# Patient Record
Sex: FEMALE | Race: WHITE | Employment: OTHER | URBAN - METROPOLITAN AREA
[De-identification: names, ages, dates, MRNs, and addresses within clinical notes are randomized per-mention and may not be internally consistent; named-entity substitution may affect disease eponyms.]

---

## 2017-01-03 ENCOUNTER — HOSPITAL ENCOUNTER (OUTPATIENT)
Dept: PHYSICAL THERAPY | Age: 72
Discharge: HOME | End: 2017-01-03
Payer: MEDICARE

## 2017-01-03 ENCOUNTER — OFFICE VISIT (OUTPATIENT)
Dept: ORTHOPEDIC SURGERY | Age: 72
End: 2017-01-03

## 2017-01-03 DIAGNOSIS — M12.812 ROTATOR CUFF ARTHROPATHY, LEFT: Primary | ICD-10-CM

## 2017-01-03 PROCEDURE — G8988 SELF CARE GOAL STATUS: HCPCS

## 2017-01-03 PROCEDURE — G8989 SELF CARE D/C STATUS: HCPCS

## 2017-01-03 PROCEDURE — 97161 PT EVAL LOW COMPLEX 20 MIN: CPT

## 2017-01-03 PROCEDURE — 97110 THERAPEUTIC EXERCISES: CPT

## 2017-01-03 PROCEDURE — 97535 SELF CARE MNGMENT TRAINING: CPT

## 2017-01-03 PROCEDURE — G8987 SELF CARE CURRENT STATUS: HCPCS

## 2017-01-03 NOTE — H&P
HISTORY AND PHYSICAL          Patient: Akbar Lee                MRN: [id number]       SSN: [id number]  YOB: 1945          AGE: 66 y.o. SEX: female      Patient scheduled for:  Left reverse total shoulder arthroplasty    Surgeon: Antonia Bella MD    ANESTHESIA TYPE:  General    HISTORY:     The patient was seen in the office today for a preoperative history and physical for an upcoming above listed surgery. The patient is a pleasant 66 y.o. female who has a history of left shoulder pain for years. Has a difficult time lifting arm. Has significant pain despite conservative treatments. Due to the current findings, affected activity of daily living and continued pain and discomfort, surgical intervention is indicated. The alternatives, risks, and complications, including but not limited to infection, blood loss, need for blood transfusion, neurovascular damage, fransisco-incisional numbness, subcutaneous hematoma, bone fracture, anesthetic complications, DVT, PE, death, RSD, postoperative stiffness and pain, possible surgical scar, delayed healing and nonhealing, reflexive sympathetic dystrophy, damage to blood vessels and nerves, need for more surgery, MI, and stroke,  failure of hardware, gait disturbances,have been discussed. The patient understands and wishes to proceed with surgery. PAST MEDICAL HISTORY:     Past Medical History   Diagnosis Date    Arthritis, left knee     Asthma     Back pain     Fuchs' corneal dystrophy      bilateral    GERD (gastroesophageal reflux disease)     Hypertension     Left shoulder pain     Osteoporosis     S/p knee replacement right        CURRENT MEDICATIONS:     Current Outpatient Prescriptions   Medication Sig Dispense Refill    albuterol (PROVENTIL HFA) 90 mcg/actuation inhaler Take 2 Puffs by inhalation every six (6) hours as needed for Wheezing.  omeprazole (PRILOSEC) 20 mg capsule Take 20 mg by mouth daily.       triamterene-hydroCHLOROthiazide (DYAZIDE) 37.5-25 mg per capsule Take 1 Cap by mouth daily.  multivitamin (ONE A DAY) tablet Take 1 Tab by mouth daily.  Calcium-Cholecalciferol, D3, (CALCIUM 600 WITH VITAMIN D3) 600 mg(1,500mg) -400 unit cap Take  by mouth two (2) times a day.  diclofenac-miSOPROStol (ARTHROTEC 75)  mg-mcg per tablet Take 1 Tab by mouth daily.  FLUoxetine (PROZAC) 20 mg capsule Take 20 mg by mouth nightly.  zolpidem (AMBIEN) 5 mg tablet Take  by mouth nightly as needed for Sleep.  meclizine (ANTIVERT) 25 mg tablet Take  by mouth three (3) times daily as needed.  loratadine (CLARITIN) 10 mg tablet Take 10 mg by mouth daily as needed for Allergies.  BRINZOLAMIDE/BRIMONIDINE TART (SIMBRINZA OP) Apply 1 Drop to eye three (3) times daily.  fluorometholone (FML FORTE) 0.25 % ophthalmic suspension Administer 1 Drop to both eyes two (2) times a day.  ciprofloxacin HCl (CIPRO) 500 mg tablet Take 1 Tab by mouth two (2) times a day for 7 days. 14 Tab 0    HYDROcodone-acetaminophen (NORCO) 7.5-325 mg per tablet Take 1 Tab by mouth every six (6) hours as needed for Pain. Max Daily Amount: 4 Tabs. 40 Tab 0    cyclobenzaprine (FLEXERIL) 10 mg tablet Take 1 Tab by mouth three (3) times daily as needed for Muscle Spasm(s). 30 Tab 0    fluticasone-salmeterol (ADVAIR) 100-50 mcg/dose diskus inhaler Take 1 Puff by inhalation every twelve (12) hours.  traMADol (ULTRAM) 50 mg tablet Take 50 mg by mouth two (2) times a day.  POTASSIUM PO Take  by mouth two (2) times a day.          ALLERGIES:     No Known Allergies      SURGICAL HISTORY:     Past Surgical History   Procedure Laterality Date    Hx heent       corneal transplants    Hx gyn       hysterectomy    Hx orthopaedic       knee replacement     Hx knee replacement Right     Hx cholecystectomy         SOCIAL HISTORY:     Social History     Social History    Marital status:      Spouse name: N/A    Number of children: N/A    Years of education: N/A     Social History Main Topics    Smoking status: Never Smoker    Smokeless tobacco: Not on file    Alcohol use No    Drug use: No    Sexual activity: Not on file     Other Topics Concern    Not on file     Social History Narrative       FAMILY HISTORY:     Family History   Problem Relation Age of Onset    Arthritis-osteo Other     Diabetes Father     Heart Disease Father        REVIEW OF SYSTEMS:     Negative for fevers, chills, chest pain, shortness of breath, weight loss, recent illness     General: Negative for fever and chills. No unexpected change in weight. Denies fatigue. No change in appetite. Skin: Negative for rash or itching. HEENT: Negative for congestion, sore throat, neck pain and neck stiffness. No change in vision or hearing. Hasn't noted any enlarged lymph nodes in the neck. Cardiovascular:  Negative for chest pain and palpitations. Has not noted pedal edema. Respiratory: Negative for cough, colds, sinus, hemoptysis, shortness of breath and wheezing. Gastrointestinal: Negative for nausea and vomiting, rectal bleeding, coffee ground emesis, abdominal pain, diarrhea and constipation. Genitourinary: Negative for dysuria, frequency urgency, or burning on micturition. No flank pain, no foul smelling urine, no difficulty with initiating urination. Hematological: Negative for bleeding or easy bruising. Musculoskeletal: Negative  for arthralgias, back pain or neck pain. Neurological: Negative for dizziness, seizures or syncopal episodes. Denies headaches. Endocrine: Denies excessive thirst.  No heat/cold intolerance. Psychiatric: Negative for depression or insomnia. PHYSICAL EXAMINATION:     VITALS: There were no vitals taken for this visit. GEN:  Well developed, well nourished 66 y.o. female in no acute distress. HEENT: Normocephalic and atraumatic. Eyes: Conjunctivae and EOM are normal.Pupils are equal, round, and reactive to light. External ear normal appearance, external nose normal appearing. Mouth/Throat: Oropharynx is clear and moist, able to handle oral secretions w/out difficulty, airway patent  NECK: Supple. Normal ROM, No lymphadenopathy. Trachea is midline. No bruising, swelling or deformity  RESP: Clear to auscultation bilaterally. No wheezes, rales, rhonchi. Normal effort and breath sounds. No respiratory distress  CARDIO:  Normal rate, regular rhythm and normal heart sounds. No MGR. ABDOMEN: Soft, non-tender, non-distended, normoactive bowel sounds in all four quadrants. There is no tenderness. There is no rebound and no guarding. BACK: No CVA or spinal tenderness  BREAST:  Deferred  PELVIC:    Deferred   RECTAL:  Deferred   :           Deferred  EXTREMITIES: EXAMINATION OF: left shoulder  Examination Left shoulder   Skin Intact   AC joint tenderness -   Biceps tenderness -   Forward flexion/Elevation    Active abduction    Glenohumeral abduction 80   External rotation ROM 40   Internal rotation ROM 30   Apprehension -   Carys Relocation -   Jerk -   Load and Shift -   Obriens -   Speeds -   Impingement sign   +   Supraspinatus/Empty Can +4/5   External Rotation Strength -, 5/5   Lift Off/Belly Press -, 5/5   Neurovascular Intact       NEUROVASCULAR: Sensation intact to light touch and strength grossly intact and symmetrical. No nystagmus. Positive distal pulses and capillary refill. DVT ASSESSMENT:  There is not  calf tenderness. No evidence of DVT seen on physical exam.  MOTOR: In tact  PSYCH: Alert an oriented to person, place and time. Mood, memory, affect, behavior and judgment normal       RADIOGRAPHS & DIAGNOSTIC STUDIES:     MRI from 2011 shows left shoulder rotator cuff tear with retraction and with glenohumeral arthritis/xray reveals proximal migration of humeral head with degenerative arthritis.        LABS:       @  CBC:   Lab Results   Component Value Date/Time    WBC 7.1 12/29/2016 10:31 AM    RBC 3.72 12/29/2016 10:31 AM    HGB 11.5 12/29/2016 10:31 AM    HCT 35.0 12/29/2016 10:31 AM    PLATELET 776 98/06/3692 10:31 AM   , BMP:   Lab Results   Component Value Date/Time    Glucose 88 12/29/2016 10:31 AM    Sodium 139 12/29/2016 10:31 AM    Potassium 4.6 12/29/2016 10:31 AM    Chloride 103 12/29/2016 10:31 AM    CO2 29 12/29/2016 10:31 AM    BUN 35 12/29/2016 10:31 AM    Creatinine 1.31 12/29/2016 10:31 AM    Calcium 9.5 12/29/2016 10:31 AM   , Coagulation:   Lab Results   Component Value Date/Time    Prothrombin time 12.1 12/29/2016 10:31 AM    INR 0.9 12/29/2016 10:31 AM    aPTT 28.7 12/29/2016 10:31 AM    and U/A show small UTI@    Preoperative labs were reviewed and are substantially within normal limits   EKG: unchanged from previous tracings, PAC's noted. CXR shows no acute abnormalities    ASSESSMENT:       Encounter Diagnosis   Name Primary?  Rotator cuff arthropathy, left Yes       PLAN:     Again, the alternatives, risks, and complications, as well as expected outcome were discussed. The patient understands and agrees to proceed with left shoulder reverse total arthroplasty. Patient has been treated with Cipro for her UTI. She has been cleared by her PCP. Patient given orders listed below:    No orders of the defined types were placed in this encounter.         Lluvia Goldsmith PA-C  1/3/2017  1:40 PM

## 2017-01-03 NOTE — PROGRESS NOTES
In [address]  [address]  [place], [address]  [phone] fax    Orthopedic Surgical Pre-op Assessment and Plan of Care     Patient name: Jj Rodriguez Start of Care: 1/3/2017   Referral source: Luigi Patton : 1945    Medical Diagnosis: Left shoulder pain [M25.512] Surgery Date:2017    Prior Hospitalization: see medical history Provider#: [id number]   Medications: Verified on Patient summary List   Date:1/3/2017      [x]  Patient  Verified  Payor: Roman Bangura / Plan: [place] PART A & B / Product Type: Medicare /          In time:1140  Out time:1225  Total Treatment Time (min): 45  Total Timed Codes (min): 23  1:1 Treatment Time ( [address] only): 39   Visit #: 1 of 1           Subjective Review:  Pain Level (0-10 scale): 3  Any medication changes, allergies to medications, adverse drug reactions, diagnosis change, or new procedure performed?: [x] No    [] Yes (see summary sheet for update)    PLOF: Unable to lift left shoulder for 4 years - injured during TKR rehab - transferring out of bed with trapeze bar. Pt refused surgery - fear of pain    Limitations to PLOF: Able to elevate shoulder up until 12 months ago. Current symptoms/Complaints: Unable sleep at night, elevate shoulder,     Previous Treatment/Compliance: Cortisone shots - refused surgery although recommended secondary to RTC tear. PMHx/Surgical Hx: (B) TKRs, gallbadder, hysterectomy, corneal transplant    Motivation: high  Substance use: [] Alcohol     [] tobacco [] other:  Cognition: A & O x 4    Other:    Activity/Recreational Limitations: Cannot elevate left shoulder    What type of home do you live in now? 2 story home, BR on second floor. Can sleep on 1st floor    Do you plan to return there after surgery? YES                   If no, where are planning to live after surgery? n/a    How many stairs/steps to enter your home?  3 Railing: YES    How many stairs/steps inside of your home? 17 Railing:  YES. Also has a ramp to enter   Can you stay on the entry level? YES    Do you live alone? YES, but sister in law will be staying with patient after hospital discharge  Will anyone be available to help you when you leave the hospital?  YES                If yes, what is their relationship to you? Sister in law         Can they assist you with bathing and dressing? YES     Can someone assist you with transportation to physical therapy? YES    Where do you plan to go when you leave the hospital? Home with assistance    What is your employment status? [x] Retired     [] Disabled     [] Employed  []  Unemployed  If EMPLOYED, job requires mainly: [] Sitting     [] Light Activity     [] Heavy Activity  Do you own or are any of the following items available to you (check all that apply)? [x] Walker: [x] Wheels [] No Wheels     [x] [employer]     [] Crutches     [x] Shower Chair/Tub Bench   [x] Bedside Commode/Elevated Toilet Seat     [x] Other equipment: lift chair, ramp    In your bathroom, do you have the following? Check all that apply. [] Tub Only   [] Tub/Shower Combination  [x] Walk-in Shower    Are you:  right handed     Describe your Functional Level (check all that apply):     Bathing [x] Independent   [] Require Assistance from someone   [] Sponge Bath Only        Dressing [x] Independent     [] Require assistance from someone for: [] socks/shoes   [] pants   [] everything   Walking [x] Independent   [] Walk indoors only   [] Walk outdoors   [] Use assistive device       [] Use wheelchair only        Household Activities [x] Routine house & yard work   [] Light house work only   [] None     ROM:  [] Unable to assess at this time     Strength:   [x] Unable to assess at this time                                                                                                    [] Left  [] Right Gross Strength Left Right   Elevation against gravity [] WNL  [] Impaired  [] Unable Flexion [] WNL  [] Impaired  [x] Unable [x] WNL  [] Impaired  [] Unable   Quality of motion [] WNL  [] Impaired   Scaption/ABD [] WNL  [] Impaired  [x] Unable [x] WNL  [] Impaired  [] Unable   Endfeel [] Soft  [] Hard  [] Springy  [] Empty IR/ER [] WNL  [x] Impaired  [] Unable [x] WNL  [] Impaired  [] Unable     Objective Screen:  1. Functional testing to determine fall risk (to be completed with a sling on the operative arm)  a. TUG: Time 8    Assistive Device: none    Fall Risk :  NO * a score of >14 seconds indicates fall risk   (low complexity <10sec, moderate complexity 10-20sec, high complexity >20sec)  b. Stair Climbing Test: Time 9 seconds  Assistive Device: none   Fall Risk :   NO  c. Functional Screen:   Sit-Supine:  Independent           Supine-sit:  Independent                Transfers:    Independent        Static Balance: 2 trials for 30 seconds with none assistance       [place]: [x]Floor   [x]Foam  [] Other    MSR:           []Floor   []Foam  [] Other  Gait Distance/Safety:   Household Distances with none and no assistance. Capable caregiver  flipClass with none  and no assistance. Capable caregiver  YES    8 min Therapeutic Exercise:  [x] See flow sheet :   Rationale: increase ROM and increase strength to improve the patients ability to prevent scar tissue    15 min Self Care/Home Management: sling donning/doffing, positioning, hygeine   Rationale: increase functional (I) and safety  to improve the patients ability to protect shoulder    Patient/Caregiver Education/Goals: To be accomplished in 1 visit  1. Caregiver/Patient demonstrates understanding of TSR precautions as stated on education form. Goal MET  YES  2.  Caregiver/Patient demonstrates and verbalizes understanding of HEP as issued on exercise handout (Pendulums, shoulder isometrics: flexion, extension, abduction, scapular squeezes, assisted elbow flexion/extension, wrist AROM, gripping). Goal MET  YES  3. Caregiver/Patient demonstrates understanding of [place] a shirt withTSR precautions as stated on education form. Goal MET  YES  4. Caregiver/Patient demonstrates understanding of [place] sling withTSR precautions as stated on education form. Goal MET  YES  Pain Level at end of session (0-10 scale): 3     The Plan of Care and following information is based on the information from the initial evaluation. Assessment/ key information: Pt presented to therapy for a pre-operative evaluation to assess functional (I) and discharge disposition. Pt is functionally (I) and lives completely (I) but has family coming in to stay with her post discharge from hospital that is capable of assisting with ADLs, safety, and transportation to outpatient PT. Both patient and therapist agree that discharge to the [address] [place] [address] is most recommended for patient. Problem List: pain affecting function, decrease ROM and decrease strength     Treatment Plan may include any combination of the following: Therapeutic exercise, Therapeutic activities, Physical agent/modality and Manual therapy    Patient / Family readiness to learn indicated by: asking questions, trying to perform skills and interest    Persons(s) to be included in education: patient (P)    Barriers to Learning/Limitations: None    Patient Goal Following Surgery: [facility]    Patient Self Reported Health Status: good    Rehabilitation Potential: excellent    Recommend Discharge to: [x] Outpatient PT     [] Home Health PT     [] SNF     Frequency / Duration: Patient to be seen 1 time per week for 1 treatment    G-Codes (GP)    Self Care   Current  CL= 60-79%   Goal  CL= 60-79%   D/C  CL= 60-79%    The severity rating is based on clinical judgment.     Certification Period: 1/3/2017 - 2/2/2017  Viry Rodrigues, PT 1/3/2017 11:36 AM    ________________________________________________________________________    I certify that the above Therapy Services are being furnished while the patient is under my care. I agree with the treatment plan and certify that this therapy is necessary.     [de-identified] Signature:____________________  Date:____________Time: _________

## 2017-01-04 DIAGNOSIS — M19.012 PRIMARY OSTEOARTHRITIS OF LEFT SHOULDER: Primary | ICD-10-CM

## 2017-01-10 ENCOUNTER — ANESTHESIA EVENT (OUTPATIENT)
Dept: SURGERY | Age: 72
DRG: 483 | End: 2017-01-10
Payer: MEDICARE

## 2017-01-11 ENCOUNTER — ANESTHESIA (OUTPATIENT)
Dept: SURGERY | Age: 72
DRG: 483 | End: 2017-01-11
Payer: MEDICARE

## 2017-01-11 ENCOUNTER — APPOINTMENT (OUTPATIENT)
Dept: GENERAL RADIOLOGY | Age: 72
DRG: 483 | End: 2017-01-11
Attending: PHYSICIAN ASSISTANT
Payer: MEDICARE

## 2017-01-11 PROBLEM — M12.819 ROTATOR CUFF TEAR ARTHROPATHY: Status: ACTIVE | Noted: 2017-01-11

## 2017-01-11 PROBLEM — M75.100 ROTATOR CUFF TEAR ARTHROPATHY: Status: ACTIVE | Noted: 2017-01-11

## 2017-01-11 PROCEDURE — 74011250636 HC RX REV CODE- 250/636: Performed by: NURSE ANESTHETIST, CERTIFIED REGISTERED

## 2017-01-11 PROCEDURE — 77030008683 HC TU ET CUF COVD -A: Performed by: ANESTHESIOLOGY

## 2017-01-11 PROCEDURE — 73020 X-RAY EXAM OF SHOULDER: CPT

## 2017-01-11 PROCEDURE — 74011000250 HC RX REV CODE- 250

## 2017-01-11 PROCEDURE — 74011250636 HC RX REV CODE- 250/636

## 2017-01-11 PROCEDURE — 74011250636 HC RX REV CODE- 250/636: Performed by: PHYSICIAN ASSISTANT

## 2017-01-11 RX ORDER — GLYCOPYRROLATE 0.2 MG/ML
INJECTION INTRAMUSCULAR; INTRAVENOUS AS NEEDED
Status: DISCONTINUED | OUTPATIENT
Start: 2017-01-11 | End: 2017-01-11 | Stop reason: HOSPADM

## 2017-01-11 RX ORDER — NEOSTIGMINE METHYLSULFATE 5 MG/5 ML
SYRINGE (ML) INTRAVENOUS AS NEEDED
Status: DISCONTINUED | OUTPATIENT
Start: 2017-01-11 | End: 2017-01-11 | Stop reason: HOSPADM

## 2017-01-11 RX ORDER — DEXAMETHASONE SODIUM PHOSPHATE 4 MG/ML
INJECTION, SOLUTION INTRA-ARTICULAR; INTRALESIONAL; INTRAMUSCULAR; INTRAVENOUS; SOFT TISSUE AS NEEDED
Status: DISCONTINUED | OUTPATIENT
Start: 2017-01-11 | End: 2017-01-11 | Stop reason: HOSPADM

## 2017-01-11 RX ORDER — SUCCINYLCHOLINE CHLORIDE 20 MG/ML
INJECTION INTRAMUSCULAR; INTRAVENOUS AS NEEDED
Status: DISCONTINUED | OUTPATIENT
Start: 2017-01-11 | End: 2017-01-11 | Stop reason: HOSPADM

## 2017-01-11 RX ORDER — ROCURONIUM BROMIDE 10 MG/ML
INJECTION, SOLUTION INTRAVENOUS AS NEEDED
Status: DISCONTINUED | OUTPATIENT
Start: 2017-01-11 | End: 2017-01-11 | Stop reason: HOSPADM

## 2017-01-11 RX ORDER — LIDOCAINE HYDROCHLORIDE 20 MG/ML
INJECTION, SOLUTION EPIDURAL; INFILTRATION; INTRACAUDAL; PERINEURAL AS NEEDED
Status: DISCONTINUED | OUTPATIENT
Start: 2017-01-11 | End: 2017-01-11 | Stop reason: HOSPADM

## 2017-01-11 RX ORDER — ONDANSETRON 2 MG/ML
INJECTION INTRAMUSCULAR; INTRAVENOUS AS NEEDED
Status: DISCONTINUED | OUTPATIENT
Start: 2017-01-11 | End: 2017-01-11 | Stop reason: HOSPADM

## 2017-01-11 RX ORDER — PROPOFOL 10 MG/ML
INJECTION, EMULSION INTRAVENOUS AS NEEDED
Status: DISCONTINUED | OUTPATIENT
Start: 2017-01-11 | End: 2017-01-11 | Stop reason: HOSPADM

## 2017-01-11 RX ORDER — EPHEDRINE SULFATE/0.9% NACL/PF 25 MG/5 ML
SYRINGE (ML) INTRAVENOUS AS NEEDED
Status: DISCONTINUED | OUTPATIENT
Start: 2017-01-11 | End: 2017-01-11 | Stop reason: HOSPADM

## 2017-01-11 RX ORDER — FENTANYL CITRATE 50 UG/ML
INJECTION, SOLUTION INTRAMUSCULAR; INTRAVENOUS AS NEEDED
Status: DISCONTINUED | OUTPATIENT
Start: 2017-01-11 | End: 2017-01-11 | Stop reason: HOSPADM

## 2017-01-11 RX ADMIN — FENTANYL CITRATE 50 MCG: 50 INJECTION, SOLUTION INTRAMUSCULAR; INTRAVENOUS at 08:00

## 2017-01-11 RX ADMIN — ROCURONIUM BROMIDE 10 MG: 10 INJECTION, SOLUTION INTRAVENOUS at 09:02

## 2017-01-11 RX ADMIN — ONDANSETRON 4 MG: 2 INJECTION INTRAMUSCULAR; INTRAVENOUS at 07:37

## 2017-01-11 RX ADMIN — FENTANYL CITRATE 100 MCG: 50 INJECTION, SOLUTION INTRAMUSCULAR; INTRAVENOUS at 07:30

## 2017-01-11 RX ADMIN — GLYCOPYRROLATE 0.2 MG: 0.2 INJECTION INTRAMUSCULAR; INTRAVENOUS at 10:20

## 2017-01-11 RX ADMIN — ROCURONIUM BROMIDE 10 MG: 10 INJECTION, SOLUTION INTRAVENOUS at 09:15

## 2017-01-11 RX ADMIN — PROPOFOL 130 MG: 10 INJECTION, EMULSION INTRAVENOUS at 07:37

## 2017-01-11 RX ADMIN — FENTANYL CITRATE 50 MCG: 50 INJECTION, SOLUTION INTRAMUSCULAR; INTRAVENOUS at 10:25

## 2017-01-11 RX ADMIN — SODIUM CHLORIDE, SODIUM LACTATE, POTASSIUM CHLORIDE, AND CALCIUM CHLORIDE: 600; 310; 30; 20 INJECTION, SOLUTION INTRAVENOUS at 08:30

## 2017-01-11 RX ADMIN — CEFAZOLIN SODIUM 2 G: 2 SOLUTION INTRAVENOUS at 07:30

## 2017-01-11 RX ADMIN — ROCURONIUM BROMIDE 10 MG: 10 INJECTION, SOLUTION INTRAVENOUS at 07:37

## 2017-01-11 RX ADMIN — ROCURONIUM BROMIDE 10 MG: 10 INJECTION, SOLUTION INTRAVENOUS at 08:05

## 2017-01-11 RX ADMIN — LIDOCAINE HYDROCHLORIDE 20 MG: 20 INJECTION, SOLUTION EPIDURAL; INFILTRATION; INTRACAUDAL; PERINEURAL at 07:37

## 2017-01-11 RX ADMIN — Medication 2 MG: at 10:20

## 2017-01-11 RX ADMIN — DEXAMETHASONE SODIUM PHOSPHATE 4 MG: 4 INJECTION, SOLUTION INTRA-ARTICULAR; INTRALESIONAL; INTRAMUSCULAR; INTRAVENOUS; SOFT TISSUE at 07:37

## 2017-01-11 RX ADMIN — Medication 10 MG: at 09:37

## 2017-01-11 RX ADMIN — FENTANYL CITRATE 50 MCG: 50 INJECTION, SOLUTION INTRAMUSCULAR; INTRAVENOUS at 10:12

## 2017-01-11 RX ADMIN — SUCCINYLCHOLINE CHLORIDE 100 MG: 20 INJECTION INTRAMUSCULAR; INTRAVENOUS at 07:37

## 2017-01-11 RX ADMIN — ROCURONIUM BROMIDE 10 MG: 10 INJECTION, SOLUTION INTRAVENOUS at 08:28

## 2017-01-11 NOTE — ANESTHESIA POSTPROCEDURE EVALUATION
Post-Anesthesia Evaluation and Assessment    Patient: Nolan Amaral MRN: [id number]  SSN: [id number]    YOB: 1945  Age: 66 y.o. Sex: female       Cardiovascular Function/Vital Signs  Visit Vitals    /85    Pulse 77    Temp 36.7 °C (98.1 °F)    Resp 16    Ht 4' 8\" (1.422 m)    Wt 73 kg (161 lb)    SpO2 100%    BMI 36.1 kg/m2       Patient is status post general, regional anesthesia for Procedure(s):  LEFT REVERSE SHOULDER ARTHROPLASTY. Nausea/Vomiting: None    Postoperative hydration reviewed and adequate. Pain:  Pain Scale 1: Numeric (0 - 10) (01/11/17 1135)  Pain Intensity 1: 0 (01/11/17 1135)   Managed    Neurological Status:   Neuro (WDL): Within Defined Limits (01/11/17 1031)   At baseline    Mental Status and Level of Consciousness: Arousable    Pulmonary Status:   O2 Device: Oxygen mask (01/11/17 1038)   Adequate oxygenation and airway patent    Complications related to anesthesia: None    Post-anesthesia assessment completed.  No concerns    Signed By: Jacki Rashid MD     January 11, 2017

## 2017-01-11 NOTE — ANESTHESIA PROCEDURE NOTES
Peripheral Block    Start time: 1/11/2017 7:20 AM  End time: 1/11/2017 7:28 AM  Performed by: Florencio Aguirre by: Alla Cherry       Pre-procedure:    Indications: at surgeon's request and post-op pain management    Preanesthetic Checklist: patient identified, risks and benefits discussed, site marked, timeout performed, anesthesia consent given and patient being monitored    Timeout Time: 07:20          Block Type:   Block Type:  Brachial plexus  Laterality:  Left  Monitoring:  Standard ASA monitoring, responsive to questions, oxygen, continuous pulse ox, frequent vital sign checks and heart rate  Injection Technique:  Single shot  Procedures: ultrasound guided and nerve stimulator    Patient Position: prone  Prep: chlorhexidine    Location:  Interscalene  Needle Type:  Stimuplex  Needle Gauge:  20 G  Needle Localization:  Nerve stimulator and ultrasound guidance  Motor Response: minimal motor response >0.4 mA    Medication Injected:  0.5%  ropivacaine  Volume (mL):  30    Assessment:  Number of attempts:  1  Injection Assessment:  Incremental injection every 5 mL, negative aspiration for CSF, local visualized surrounding nerve on ultrasound, negative aspiration for blood, no intravascular symptoms, no paresthesia and ultrasound image on chart  Patient tolerance:  Patient tolerated the procedure well with no immediate complications

## 2017-01-11 NOTE — ANESTHESIA PREPROCEDURE EVALUATION
Anesthetic History   No history of anesthetic complications            Review of Systems / Medical History  Patient summary reviewed and pertinent labs reviewed    Pulmonary            Asthma        Neuro/Psych   Within defined limits           Cardiovascular    Hypertension              Exercise tolerance: >4 METS     GI/Hepatic/Renal     GERD           Endo/Other        Arthritis     Other Findings   Comments: Current Smoker? NO       Elective Surgery? Yes       Abstained from smoking 24 hours prior to anesthesia? N/A    Risk Factors for Postoperative nausea/vomiting:       History of postoperative nausea/vomiting? NO       Female? YES       Motion sickness? NO       Intended opioid administration for postoperative analgesia?   NO           Physical Exam    Airway  Mallampati: II  TM Distance: 4 - 6 cm  Neck ROM: normal range of motion   Mouth opening: Normal     Cardiovascular  Regular rate and rhythm,  S1 and S2 normal,  no murmur, click, rub, or gallop             Dental  No notable dental hx       Pulmonary  Breath sounds clear to auscultation               Abdominal  GI exam deferred       Other Findings            Anesthetic Plan    ASA: 2  Anesthesia type: general and regional - interscalene block          Induction: Intravenous  Anesthetic plan and risks discussed with: Patient

## 2017-01-13 ENCOUNTER — APPOINTMENT (OUTPATIENT)
Dept: ULTRASOUND IMAGING | Age: 72
DRG: 483 | End: 2017-01-13
Attending: EMERGENCY MEDICINE
Payer: MEDICARE

## 2017-01-13 PROCEDURE — 76770 US EXAM ABDO BACK WALL COMP: CPT

## 2017-01-16 ENCOUNTER — APPOINTMENT (OUTPATIENT)
Dept: PHYSICAL THERAPY | Age: 72
End: 2017-01-16
Payer: MEDICARE

## 2017-01-17 ENCOUNTER — OFFICE VISIT (OUTPATIENT)
Dept: ORTHOPEDIC SURGERY | Age: 72
End: 2017-01-17

## 2017-01-17 VITALS
SYSTOLIC BLOOD PRESSURE: 142 MMHG | BODY MASS INDEX: 37.12 KG/M2 | WEIGHT: 165 LBS | HEIGHT: 56 IN | DIASTOLIC BLOOD PRESSURE: 72 MMHG | HEART RATE: 84 BPM

## 2017-01-17 DIAGNOSIS — Z96.612 PRESENCE OF LEFT ARTIFICIAL SHOULDER JOINT: Primary | ICD-10-CM

## 2017-01-17 NOTE — PROGRESS NOTES
Chief Complaint   Patient presents with    Surgical Follow-up     s/p left total reverse shoulder     No pain currently

## 2017-01-17 NOTE — PROGRESS NOTES
Xander Baca  1945     HISTORY OF PRESENT ILLNESS  Xander Baca is a 66 y.o. female who presents today for evaluation s/p Left shoulder reverse total arthroplasty on 17. Patient has been going to PT as home health is coming to her house. She still has catheter in for bladder retention. Sees PCP tomorrow. Describes pain as a 5/10. Has been taking percocet for pain. Still has night pain. Patient denies any fever, chills, chest pain, shortness of breath or calf pain. There are no new illness or injuries to report since last seen in the office. PHYSICAL EXAM:   Visit Vitals    /72    Pulse 84    Ht 4' 8\" (1.422 m)    Wt 165 lb (74.8 kg)    BMI 36.99 kg/m2      The patient is a well-developed, well-nourished female in no acute distress. The patient is alert and oriented times three. The patient appears to be well groomed. Mood and affect are normal.  ORTHOPEDIC EXAM of left shoulder:  Inspection: swelling not present,  Bruising not present  Incision, clean, dry, intact, sutures in place  Passive glenohumeral abduction 0-50 degrees  Stability: Stable  Strength: n/a  2+ distal pulses    IMAGIN view xray of left shoulder show hardware in excellent position    Pathology:    A. SOFT TISSUE MASS LEFT SHOULDER:   BURSA TISSUE WITH CHRONIC INFLAMMATION. B. HUMERAL HEAD LEFT SHOULDER:   MARKED OSTEOARTHRITIS. IMPRESSION:  S/P Left shoulder reverse total arthroplasty    PLAN:   Surgery discussed at length. Incisions cleaned, steri strips applied. Patient to continue with PROM and PT. Continue wearing sling.   RTC 3 weeks    RADHA Michele [facility]

## 2017-01-18 ENCOUNTER — TELEPHONE (OUTPATIENT)
Dept: ORTHOPEDIC SURGERY | Age: 72
End: 2017-01-18

## 2017-01-18 NOTE — TELEPHONE ENCOUNTER
Patient seen yesterday by Marla Cunha give orders for a hospital bed. MsEvelia Yadira Mantilla showed orders to the home therapist and was told the physician would need to send orders to the [address] [place] [address] Bret Elise fax [phone] to get hospital bed sent to her home. Please call Mrs. Castillo back at [phone]

## 2017-01-18 NOTE — TELEPHONE ENCOUNTER
THAIS FROM [facility] CALLED FOR LETTY ALSTON. THAIS SAID THAT THEY DO NOT SUPPLY PED HOSE. THAT THEY INSTRUCTED THE PATIENT TO PURCHASE THE PED HOSE FROM A PHARMACY. THAIS JUST WANTED LETTY ALSTON TO KNOW THIS . [address]   TEL. [phone].

## 2017-01-20 ENCOUNTER — TELEPHONE (OUTPATIENT)
Dept: ORTHOPEDIC SURGERY | Age: 72
End: 2017-01-20

## 2017-01-20 NOTE — TELEPHONE ENCOUNTER
Patient still does not have the hospital bed that was ordered. She is having swelling in her legs now because she is sleeping in a chair.

## 2017-01-20 NOTE — TELEPHONE ENCOUNTER
Called patient and got the home health nurse's phone number. Pt states that the order for the hospital bed was cancelled, but she also stated that they needed a face to face with Magdy Davis to approve the bed. Called and left vmail for Mariana Augustin to call me back to get clarification.

## 2017-02-07 ENCOUNTER — OFFICE VISIT (OUTPATIENT)
Dept: ORTHOPEDIC SURGERY | Age: 72
End: 2017-02-07

## 2017-02-07 VITALS
RESPIRATION RATE: 15 BRPM | HEART RATE: 87 BPM | BODY MASS INDEX: 34.87 KG/M2 | WEIGHT: 155 LBS | DIASTOLIC BLOOD PRESSURE: 73 MMHG | SYSTOLIC BLOOD PRESSURE: 125 MMHG | HEIGHT: 56 IN

## 2017-02-07 DIAGNOSIS — M25.512 LEFT SHOULDER PAIN, UNSPECIFIED CHRONICITY: ICD-10-CM

## 2017-02-07 DIAGNOSIS — M75.102 ROTATOR CUFF TEAR ARTHROPATHY, LEFT: Primary | ICD-10-CM

## 2017-02-07 DIAGNOSIS — M12.812 ROTATOR CUFF TEAR ARTHROPATHY, LEFT: Primary | ICD-10-CM

## 2017-02-07 NOTE — PROGRESS NOTES
Arnie Zepeda  1945     HISTORY OF PRESENT ILLNESS  Arnie Zepeda is a 66 y.o. female who presents today for evaluation s/p Left shoulder reverse total arthroplasty on 1/11/17. Patient has been going to PT as home health is coming to her house. She states her pain is very minimal and she has little to no discomfort day to day. Some discomfort at night. Patient denies any fever, chills, chest pain, shortness of breath or calf pain. There are no new illness or injuries to report since last seen in the office. PHYSICAL EXAM:   Visit Vitals    /73    Pulse 87    Resp 15    Ht 4' 8\" (1.422 m)    Wt 155 lb (70.3 kg)    BMI 34.75 kg/m2      The patient is a well-developed, well-nourished female in no acute distress. The patient is alert and oriented times three. The patient appears to be well groomed. Mood and affect are normal.  ORTHOPEDIC EXAM of left shoulder:  Inspection: swelling not present,  Bruising not present  Incision, clean, dry, intact, sutures in place  Passive glenohumeral abduction 0-85 degrees  Stability: Stable  Strength: n/a  2+ distal pulses      Pathology:    A. SOFT TISSUE MASS LEFT SHOULDER:   BURSA TISSUE WITH CHRONIC INFLAMMATION. B. HUMERAL HEAD LEFT SHOULDER:   MARKED OSTEOARTHRITIS. IMPRESSION:  S/P Left shoulder reverse total arthroplasty    PLAN:   1. D/c sling now, may start active assist and arom now  2. Order for outpatient PT given  3.  RTC 1 month    Patient seen and evaluated by Dr. Darcy Digsg today who agrees with treatment plan     Shanita Patel PA-C  [facility]

## 2017-02-10 ENCOUNTER — HOSPITAL ENCOUNTER (OUTPATIENT)
Dept: PHYSICAL THERAPY | Age: 72
Discharge: HOME | End: 2017-02-10
Payer: MEDICARE

## 2017-02-10 PROCEDURE — 97140 MANUAL THERAPY 1/> REGIONS: CPT | Performed by: PHYSICAL THERAPIST

## 2017-02-10 PROCEDURE — G8984 CARRY CURRENT STATUS: HCPCS | Performed by: PHYSICAL THERAPIST

## 2017-02-10 PROCEDURE — G8985 CARRY GOAL STATUS: HCPCS | Performed by: PHYSICAL THERAPIST

## 2017-02-10 PROCEDURE — 97110 THERAPEUTIC EXERCISES: CPT | Performed by: PHYSICAL THERAPIST

## 2017-02-10 PROCEDURE — 97161 PT EVAL LOW COMPLEX 20 MIN: CPT | Performed by: PHYSICAL THERAPIST

## 2017-02-10 NOTE — PROGRESS NOTES
PT DAILY TREATMENT NOTE - [facility]     Patient Name: Nolan Amaral  Date:2/10/2017  : 1945  [x]  Patient  Verified  Payor: Jim Tan / Plan: [place] MED[place] & B / Product Type: Medicare /    In time:1200  Out time:1240  Total Treatment Time (min): 40  Total Timed Codes (min): 24  1:1 Treatment Time ( [address] only): 40   Visit #: 1 of 12    Treatment Area: Primary osteoarthritis, left shoulder [M19.012]    SUBJECTIVE  Pain Level (0-10 scale): 0/10  Any medication changes, allergies to medications, adverse drug reactions, diagnosis change, or new procedure performed?: [x] No    [] Yes (see summary sheet for update)  Subjective functional status/changes:   [] No changes reported  HPI: Pt c/o stiffness but not pain in the L shoulder s/p reverse total shoulder on 17. Reports she ended up having to have HHPT and nursing because she required a catheter after the operation for 1 week. Reports she just DC'd the sling this past Tuesday. Reports minimal to no pain and that she is no longer taking pain medication but that she has increased stiffness in the mornings and if she stays still for too long. She can decrease the stiffness with movement. Pt lives alone but has her grand daughter staying with her until this weekend for assistance. Reports she is clear to drive when she feels comfortable and that the pre surgery pain is gone. Reports hx of B TKR.      OBJECTIVE    Modality rationale:  to improve the patients ability to    Min Type Additional Details    [] Estim:  []Unatt       []IFC  []Premod                        []Other:  []w/ice   []w/heat  Position:  Location:    [] Estim: []Att    []TENS instruct  []NMES                    []Other:  []w/US   []w/ice   []w/heat  Position:  Location:    []  Traction: [] Cervical       []Lumbar                       [] Prone          []Supine                       []Intermittent   []Continuous Lbs:  [] before manual  [] after manual    []  Ultrasound: []Continuous [] Pulsed                           []1MHz   []3MHz W/cm2:  Location:    []  Iontophoresis with dexamethasone         Location: [] Take home patch   [] In clinic    []  Ice     []  heat  []  Ice massage  []  Laser   []  Anodyne Position:  Location:    []  Laser with stim  []  Other:  Position:  Location:    []  Vasopneumatic Device Pressure:       [] lo [] med [] hi   Temperature: [] lo [] med [] hi   [] Skin assessment post-treatment:  []intact []redness- no adverse reaction    []redness  adverse reaction:     16 min [x]Eval                  []Re-Eval       13 min Therapeutic Exercise:  [] See flow sheet : discussed treatment and expectations, instructed in and demo'd HEP   Rationale: increase ROM, increase strength, improve coordination and increase proprioception to improve the patients ability to decrease pain and improve activity tolerance     min Therapeutic Activity:  []  See flow sheet :   Rationale:   to improve the patients ability to       min Neuromuscular Re-education:  []  See flow sheet :   Rationale:   to improve the patients ability to     11 min Manual Therapy:  STJ mobs, TPR to L UT and LS, gh jt mobs grade 1-2 inf and post for mobility, manual str all planes per protocol and in pain free range   Rationale: decrease pain, increase ROM, increase tissue extensibility, decrease trigger points and increase postural awareness to improve activity tolerance and mobility      min Gait Training:  ___ feet with ___ device on level surfaces with ___ level of assist   Rationale: With   [] TE   [] TA   [] neuro   [] other: Patient Education: [x] Review HEP    [] Progressed/Changed HEP based on:   [] positioning   [] body mechanics   [] transfers   [] heat/ice application    [] other:      Other Objective/Functional Measures: Pt presents w/o sling. Incision is healing well and shows no signs of infection. AROM L shoulder flexion = 72, scaption = 38.  PROM L shoulder flexion is 120 deg, scap to 90 deg, IR comfortably to stomach, ER to 30 deg w/o pain or restriction. MMT NT. Full elbow and wrist AROM noted B. AROM R shoulder limited as well 2' bursitis. Pain Level (0-10 scale) post treatment: 0/10    ASSESSMENT/Changes in Function: Follow bundle protocol and progress as tolerated within the protocol restrictions. Patient will continue to benefit from skilled PT services to modify and progress therapeutic interventions, address functional mobility deficits, address ROM deficits, address strength deficits, analyze and address soft tissue restrictions, analyze and cue movement patterns, analyze and modify body mechanics/ergonomics, assess and modify postural abnormalities and instruct in home and community integration to attain remaining goals. [x]  See Plan of Care  []  See progress note/recertification  []  See Discharge Summary         Progress towards goals / Updated goals:  Short Term Goals: To be accomplished in 2 weeks:  1. Pt will be independent and compliant w/ HEP to progress gains in PT. At eval: reviewed, progressed, and added to HEP  2. Pt will have AAROM L shoulder flexion to > or = to 100 deg for ease w/ bathing. At eval: AAROM L shoulder flex = 45 deg  Long Term Goals: To be accomplished in 6 weeks:  1. Pt will improve FOTO to > or = to 64 to demo improved function. At eval: FOTO = 49  2. Pt will improve AROM L shoulder flexion to > or = to 90 deg for ease w/ dressing. At eval: AROM L shoulder = 72  3. Pt will improve MMT L shoulder to > or = to 3-3+/5 for ease w/ driving. At eval: NT  4. Pt will report > or = to 75% improvement in ADL completion and no increased pain for ease w/ return to pain free function.    At eval: 0%    PLAN  []  Upgrade activities as tolerated     []  Continue plan of care  []  Update interventions per flow sheet       []  Discharge due to:_  [x]  Other: 2x/6 weeks      Henry Houston PT 2/10/2017  1:57 PM    Future Appointments  Date Time Provider Bryant Maggie   2/14/2017 11:30 AM Carlton Blare, PT [facility]   2/16/2017 3:00 PM Carlton Blare, PT [facility]   2/21/2017 3:30 PM Benjaman Means, PT [facility]   2/23/2017 3:00 PM Benjaman Means, PT [facility]   2/28/2017 5:00 PM Carlton Blare, PT [facility]   3/2/2017 8:00 AM [facility] Blare[facility]   3/7/2017 1:00 PM [facility]   3/7/2017 6:00 PM Carlton Blare, [facility]   3/9/2017 8:30 AM Carlton Blare, PT [facility]   3/14/2017 5:00 PM Carlton Blare, PT [facility]   3/16/2017 11:00 AM Carlton Blare, PT [facility]   3/21/2017 5:00 PM Carlton Blare, [facility]   3/23/2017 11:00 AM Carlton Blare, PT [facility]

## 2017-02-10 NOTE — PROGRESS NOTES
[facility]              [address]        [place], [address]   [phone] fax    Plan of Care/ Statement of Necessity for Physical Therapy Services    Patient name: Alejandrina Gentile Start of Care: 2/10/2017   Referral source: Valentino Magic : 1945    Medical Diagnosis: Primary osteoarthritis, left shoulder [M19.012]   Onset Date:17    Treatment Diagnosis: s/p L reverse TSR   Prior Hospitalization: see medical history Provider#: [id number]   Medications: Verified on Patient summary List    Comorbidities: arthritis, asthma, back pain, BMI >30, prior surgery, prosthesis/implants   Prior Level of Function: hx of limited AROM L shoulder and increased pain, lives alone      The Plan of Care and following information is based on the information from the initial evaluation. Assessment/ key information: Pt is a 67 y/o female w/ c/o stiffness but not pain in the L shoulder s/p reverse total shoulder on 17. Reports she ended up having to have HHPT and nursing because she required a catheter after the operation for 1 week. Reports she just DC'd the sling this past Tuesday. Reports minimal to no pain and that she is no longer taking pain medication but that she has increased stiffness in the mornings and if she stays still for too long. She can decrease the stiffness with movement. Pt lives alone but has her grand daughter staying with her until this weekend for assistance. Reports she is clear to drive when she feels comfortable and that the pre surgery pain is gone. Reports hx of B TKR. Pt presents w/o sling. Incision is healing well and shows no signs of infection. AROM L shoulder flexion = 72, scaption = 38. PROM L shoulder flexion is 120 deg, scap to 90 deg, IR comfortably to stomach, ER to 30 deg w/o pain or restriction. MMT NT. Full elbow and wrist AROM noted B. AROM R shoulder limited as well 2' bursitis.  Pt will benefit from skilled PT to address the deficits and progress with pt goals. Evaluation Complexity History MEDIUM  Complexity : 1-2 comorbidities / personal factors will impact the outcome/ POC ; Examination LOW Complexity : 1-2 Standardized tests and measures addressing body structure, function, activity limitation and / or participation in recreation  ;Presentation LOW Complexity : Stable, uncomplicated  ;Clinical Decision Making MEDIUM Complexity : FOTO score of 26-74  Overall Complexity Rating: LOW   Problem List: pain affecting function, decrease ROM, decrease strength, decrease ADL/ functional abilitiies, decrease activity tolerance and decrease flexibility/ joint mobility   Treatment Plan may include any combination of the following: Therapeutic exercise, Therapeutic activities, Neuromuscular re-education, Physical agent/modality, Manual therapy, Patient education and Functional mobility training  Patient / Family readiness to learn indicated by: asking questions, trying to perform skills and interest  Persons(s) to be included in education: patient (P)  Barriers to Learning/Limitations: None  Patient Goal (s): full use of left arm  Patient Self Reported Health Status: good  Rehabilitation Potential: good    Short Term Goals: To be accomplished in 2 weeks:  1. Pt will be independent and compliant w/ HEP to progress gains in PT.   2. Pt will have AAROM L shoulder flexion to > or = to 100 deg for ease w/ bathing. Long Term Goals: To be accomplished in 6 weeks:  1. Pt will improve FOTO to > or = to 64 to demo improved function. 2. Pt will improve AROM L shoulder flexion to > or = to 90 deg for ease w/ dressing. 3. Pt will improve MMT L shoulder to > or = to 3-3+/5 for ease w/ driving. 4. Pt will report > or = to 75% improvement in ADL completion and no increased pain for ease w/ return to pain free function. Frequency / Duration: Patient to be seen 2 times per week for 6 weeks.     Patient/ Caregiver education and instruction: Diagnosis, prognosis, activity modification and exercises   [x]  Plan of care has been reviewed with PTA    G-Codes (GP)  Carry   Current  CK= 40-59%    Goal  CJ= 20-39%    The severity rating is based on clinical judgment and the FOTO score. Certification Period: 2/10/17 - 5/9/17  Anila Carnes, PT 2/10/2017 2:09 PM  ________________________________________________________________________    I certify that the above Therapy Services are being furnished while the patient is under my care. I agree with the treatment plan and certify that this therapy is necessary.     [de-identified] Signature:____________________  Date:____________Time: _________    Please sign and return to [facility] [address], [address] [place]   [phone] fax

## 2017-02-14 ENCOUNTER — HOSPITAL ENCOUNTER (OUTPATIENT)
Dept: PHYSICAL THERAPY | Age: 72
Discharge: HOME | End: 2017-02-14
Payer: MEDICARE

## 2017-02-14 PROCEDURE — 97140 MANUAL THERAPY 1/> REGIONS: CPT | Performed by: PHYSICAL THERAPIST

## 2017-02-14 PROCEDURE — 97110 THERAPEUTIC EXERCISES: CPT | Performed by: PHYSICAL THERAPIST

## 2017-02-14 NOTE — PROGRESS NOTES
PT DAILY TREATMENT NOTE - [facility]     Patient Name: Beth Nguyen  Date:2017  : 1945  [x]  Patient  Verified  Payor: Van Lara / Plan: [place] MEDICARE PART A & B / Product Type: Medicare /    In time:1125  Out time:1214  Total Treatment Time (min): 49  Total Timed Codes (min): 39  1:1 Treatment Time ( [address] only): 44   Visit #: 2 of 12    Treatment Area: Primary osteoarthritis, left shoulder [M19.012]    SUBJECTIVE  Pain Level (0-10 scale): 0/10  Any medication changes, allergies to medications, adverse drug reactions, diagnosis change, or new procedure performed?: [x] No    [] Yes (see summary sheet for update)  Subjective functional status/changes:   [] No changes reported  Pt reports she is able to raise her L arm OH w/ the wand while laying down w/ no pain     OBJECTIVE    Modality rationale: decrease edema, decrease inflammation and decrease pain to improve the patients ability to improve activity tolerance and mobility    Min Type Additional Details    [] Estim:  []Unatt       []IFC  []Premod                        []Other:  []w/ice   []w/heat  Position:  Location:    [] Estim: []Att    []TENS instruct  []NMES                    []Other:  []w/US   []w/ice   []w/heat  Position:  Location:    []  Traction: [] Cervical       []Lumbar                       [] Prone          []Supine                       []Intermittent   []Continuous Lbs:  [] before manual  [] after manual    []  Ultrasound: []Continuous   [] Pulsed                           []1MHz   []3MHz W/cm2:  Location:    []  Iontophoresis with dexamethasone         Location: [] Take home patch   [] In clinic   10 [x]  Ice     []  heat  []  Ice massage  []  Laser   []  Anodyne Position: supine  Location: L shoulder    []  Laser with stim  []  Other:  Position:  Location:    []  Vasopneumatic Device Pressure:       [] lo [] med [] hi   Temperature: [] lo [] med [] hi   [] Skin assessment post-treatment:  []intact []redness- no adverse reaction    []redness  adverse reaction:      min []Eval                  []Re-Eval       29 min Therapeutic Exercise:  [x] See flow sheet : initiated per flow sheet   Rationale: increase ROM, increase strength, improve coordination and increase proprioception to improve the patients ability to improve ADLs     min Therapeutic Activity:  []  See flow sheet :   Rationale:   to improve the patients ability to       min Neuromuscular Re-education:  []  See flow sheet :   Rationale:   to improve the patients ability to     10 min Manual Therapy:  STJ mobs, TPR to L UT and LS, gh jt mobs grade 1-2 inf and post for mobility, manual str all planes per protocol and in pain free range   Rationale: decrease pain, increase ROM, increase tissue extensibility, decrease trigger points and increase postural awareness to improve activity tolerance and mobility      min Gait Training:  ___ feet with ___ device on level surfaces with ___ level of assist   Rationale: With   [] TE   [] TA   [] neuro   [] other: Patient Education: [x] Review HEP    [] Progressed/Changed HEP based on:   [] positioning   [] body mechanics   [] transfers   [] heat/ice application    [] other:      Other Objective/Functional Measures:      Pain Level (0-10 scale) post treatment: 0/10    ASSESSMENT/Changes in Function: initiated POC per flow sheet, pt reported soreness with AAROM ex's and required VC's to avoid pain ranges. Reassess tolerance and continue to progress as tolerated within protocol restrictions. Patient will continue to benefit from skilled PT services to modify and progress therapeutic interventions, address functional mobility deficits, address ROM deficits, address strength deficits, analyze and address soft tissue restrictions, analyze and cue movement patterns, analyze and modify body mechanics/ergonomics, assess and modify postural abnormalities and instruct in home and community integration to attain remaining goals. []  See Plan of Care  []  See progress note/recertification  []  See Discharge Summary         Progress towards goals / Updated goals:  Short Term Goals: To be accomplished in 2 weeks:  1. Pt will be independent and compliant w/ HEP to progress gains in PT. At eval: reviewed, progressed, and added to HEP  Current: Met, per pt report 2/14/17  2. Pt will have AAROM L shoulder flexion to > or = to 100 deg for ease w/ bathing. At eval: AAROM L shoulder flex = 45 deg  Long Term Goals: To be accomplished in 6 weeks:  1. Pt will improve FOTO to > or = to 64 to demo improved function. At eval: FOTO = 49  2. Pt will improve AROM L shoulder flexion to > or = to 90 deg for ease w/ dressing. At eval: AROM L shoulder = 72  3. Pt will improve MMT L shoulder to > or = to 3-3+/5 for ease w/ driving. At eval: NT  4. Pt will report > or = to 75% improvement in ADL completion and no increased pain for ease w/ return to pain free function.    At eval: 0%    PLAN  [x]  Upgrade activities as tolerated     [x]  Continue plan of care  []  Update interventions per flow sheet       []  Discharge due to:_  []  Other:_      Bernadette Jolley PT 2/14/2017  11:34 AM    Future Appointments  Date Time Provider Bryant Serrano   2/16/2017 3:00 PM Bernadette Jolley PT [facility]   2/21/2017 3:30 PM Sully Bear PT [facility]   2/23/2017 3:00 PM Sully Bear PT [facility]   2/28/2017 5:00 PM Bernadette Jolley PT [facility]   3/2/2017 8:00 AM [facility]   3/7/2017 1:00 PM LETTY ChuMD NAYELY SCHED   3/7/2017 6:00 PM [facility]ley [facility]   3/9/2017 8:30 AM Bernadette Jolley, PT [facility]   3/14/2017 5:00 PM Bernadette Jolley, PT [facility]   3/16/2017 11:00 AM Bernadette Jolley, PT [facility]   3/21/2017 5:00 PM Bernadette Jolley, PT [facility]   3/23/2017 11:00 AM Bernadette Jolley, PT [facility]

## 2017-02-16 ENCOUNTER — HOSPITAL ENCOUNTER (OUTPATIENT)
Dept: PHYSICAL THERAPY | Age: 72
Discharge: HOME | End: 2017-02-16
Payer: MEDICARE

## 2017-02-16 PROCEDURE — 97110 THERAPEUTIC EXERCISES: CPT | Performed by: PHYSICAL THERAPIST

## 2017-02-16 PROCEDURE — 97140 MANUAL THERAPY 1/> REGIONS: CPT | Performed by: PHYSICAL THERAPIST

## 2017-02-16 NOTE — PROGRESS NOTES
PT DAILY TREATMENT NOTE - [facility]     Patient Name: Justin Estrada  Date:2017  : 1945  [x]  Patient  Verified  Payor: Marilee Banerjee / Plan: [place] MED[place] B / Product Type: Medicare /    In time:255  Out time:350  Total Treatment Time (min): 55  Total Timed Codes (min): 45  1:1 Treatment Time ( [address] only): 39   Visit #: 2 of 12    Treatment Area: Primary osteoarthritis, left shoulder [M19.012]    SUBJECTIVE  Pain Level (0-10 scale): 0-1/10  Any medication changes, allergies to medications, adverse drug reactions, diagnosis change, or new procedure performed?: [x] No    [] Yes (see summary sheet for update)  Subjective functional status/changes:   [] No changes reported  Pt reports she pushed a door open with her L hand and felt a sharp pain in the anterior shoulder. States her arm has been sore now and hurts more with moving it.      OBJECTIVE    Modality rationale: decrease edema, decrease inflammation and decrease pain to improve the patients ability to improve activity tolerance   Min Type Additional Details    [] Estim:  []Unatt       []IFC  []Premod                        []Other:  []w/ice   []w/heat  Position:  Location:    [] Estim: []Att    []TENS instruct  []NMES                    []Other:  []w/US   []w/ice   []w/heat  Position:  Location:    []  Traction: [] Cervical       []Lumbar                       [] Prone          []Supine                       []Intermittent   []Continuous Lbs:  [] before manual  [] after manual    []  Ultrasound: []Continuous   [] Pulsed                           []1MHz   []3MHz W/cm2:  Location:    []  Iontophoresis with dexamethasone         Location: [] Take home patch   [] In clinic   10 [x]  Ice     []  heat  []  Ice massage  []  Laser   []  Anodyne Position: supine  Location: L shoulder    []  Laser with stim  []  Other:  Position:  Location:    []  Vasopneumatic Device Pressure:       [] lo [] med [] hi   Temperature: [] lo [] med [] hi   [] Skin assessment post-treatment:  []intact []redness- no adverse reaction    []redness  adverse reaction:      min []Eval                  []Re-Eval       35 min Therapeutic Exercise:  [x] See flow sheet : increased per flow sheet   Rationale: increase ROM, increase strength, improve coordination and increase proprioception to improve the patients ability to improve activity tolerance and dressing     min Therapeutic Activity:  []  See flow sheet :   Rationale:   to improve the patients ability to       min Neuromuscular Re-education:  []  See flow sheet :   Rationale:   to improve the patients ability to     10 Min Manual Therapy:  STJ mobs, TPR to L UT and LS, gh jt mobs grade 1-2 inf and post for mobility, manual str all planes per protocol and in pain free range, pec kp release   Rationale: decrease pain, increase ROM, increase tissue extensibility, decrease trigger points and increase postural awareness to improve activity tolerance and mobility     min Gait Training:  ___ feet with ___ device on level surfaces with ___ level of assist   Rationale: With   [] TE   [] TA   [] neuro   [] other: Patient Education: [x] Review HEP    [] Progressed/Changed HEP based on:   [] positioning   [] body mechanics   [] transfers   [] heat/ice application    [] other:      Other Objective/Functional Measures:      Pain Level (0-10 scale) post treatment: 0/10    ASSESSMENT/Changes in Function: Noted large TrP in the L pec kp, UT, and medial delt that decreased w/ manual. Instructed pt to rest the L UE for the rest of the day. Discussed return to activity and how to prevent injury or over use.     Patient will continue to benefit from skilled PT services to modify and progress therapeutic interventions, address functional mobility deficits, address ROM deficits, address strength deficits, analyze and address soft tissue restrictions, analyze and cue movement patterns, analyze and modify body mechanics/ergonomics, assess and modify postural abnormalities and instruct in home and community integration to attain remaining goals. []  See Plan of Care  []  See progress note/recertification  []  See Discharge Summary         Progress towards goals / Updated goals:  Short Term Goals: To be accomplished in 2 weeks:  1. Pt will be independent and compliant w/ HEP to progress gains in PT. At eval: reviewed, progressed, and added to HEP  Current: Met, per pt report 2/14/17  2. Pt will have AAROM L shoulder flexion to > or = to 100 deg for ease w/ bathing. At eval: AAROM L shoulder flex = 45 deg  Long Term Goals: To be accomplished in 6 weeks:  1. Pt will improve FOTO to > or = to 64 to demo improved function. At eval: FOTO = 49  2. Pt will improve AROM L shoulder flexion to > or = to 90 deg for ease w/ dressing. At eval: AROM L shoulder = 72  3. Pt will improve MMT L shoulder to > or = to 3-3+/5 for ease w/ driving. At eval: NT  4. Pt will report > or = to 75% improvement in ADL completion and no increased pain for ease w/ return to pain free function.    At eval: 0%    PLAN  []  Upgrade activities as tolerated     [x]  Continue plan of care  []  Update interventions per flow sheet       []  Discharge due to:_  []  Other:_      Hernando Long PT 2/16/2017  3:58 PM    Future Appointments  Date Time Provider Bryant Serrano   2/21/2017 3:30 PM Rufus Cornejo, PT [facility]   2/23/2017 3:00 PM Rufus Cornejo PT [facility]   2/28/2017 5:00 PM Hernando Long PT [facility]   3/2/2017 8:00 AM Hernando Long PT [facility]   3/7/2017 1:00 PM LETTY Rodriguez NAY[facility] SCHED   3/7/2017 6:00 PM Hernando Long PT [facility]   3/9/2017 8:30 AM Hernando Long PT [facility]   3/14/2017 5:00 PM Hernando Long, PT [facility]   3/16/2017 11:00 AM Hernando Long, PT [facility]   3/21/2017 5:00 PM Hernando Long, PT [facility]   3/23/2017 11:00 AM Hernando Long, PT [facility]

## 2017-02-21 ENCOUNTER — HOSPITAL ENCOUNTER (OUTPATIENT)
Dept: PHYSICAL THERAPY | Age: 72
Discharge: HOME | End: 2017-02-21
Payer: MEDICARE

## 2017-02-21 PROCEDURE — 97110 THERAPEUTIC EXERCISES: CPT

## 2017-02-21 PROCEDURE — 97140 MANUAL THERAPY 1/> REGIONS: CPT

## 2017-02-21 NOTE — PROGRESS NOTES
PT DAILY TREATMENT NOTE - [facility]     Patient Name: Mario Selby  Date:2017  : 1945  [x]  Patient  Verified  Payor: Ashlee Breath / Plan: [place] MEDICARE PART A & B / Product Type: Medicare /    In time:3:25  Out time:4:15  Total Treatment Time (min): 50  Total Timed Codes (min): 40  1:1 Treatment Time ( [address] only): 40   Visit #: 4 of 12    Treatment Area: Primary osteoarthritis, left shoulder [M19.012]    SUBJECTIVE  Pain Level (0-10 scale): 1  Any medication changes, allergies to medications, adverse drug reactions, diagnosis change, or new procedure performed?: [x] No    [] Yes (see summary sheet for update)  Subjective functional status/changes:   [] No changes reported  Pt reports she did not take any pain meds before PT today, just minimal soreness.      OBJECTIVE    Modality rationale: decrease inflammation and decrease pain to improve the patients ability to perform ADL's   Min Type Additional Details    [] Estim:  []Unatt       []IFC  []Premod                        []Other:  []w/ice   []w/heat  Position:  Location:    [] Estim: []Att    []TENS instruct  []NMES                    []Other:  []w/US   []w/ice   []w/heat  Position:  Location:    []  Traction: [] Cervical       []Lumbar                       [] Prone          []Supine                       []Intermittent   []Continuous Lbs:  [] before manual  [] after manual    []  Ultrasound: []Continuous   [] Pulsed                           []1MHz   []3MHz W/cm2:  Location:    []  Iontophoresis with dexamethasone         Location: [] Take home patch   [] In clinic   10 [x]  Ice     []  heat  []  Ice massage  []  Laser   []  Anodyne Position: supine  Location: L shoulder     []  Laser with stim  []  Other:  Position:  Location:    []  Vasopneumatic Device Pressure:       [] lo [] med [] hi   Temperature: [] lo [] med [] hi   [] Skin assessment post-treatment:  []intact []redness- no adverse reaction    []redness  adverse reaction:      min []Eval []Re-Eval       28 min Therapeutic Exercise:  [x] See flow sheet :   Rationale: increase ROM, increase strength and improve coordination to improve the patients ability to perform ADL's     min Therapeutic Activity:  []  See flow sheet :   Rationale:   to improve the patients ability to       min Neuromuscular Re-education:  []  See flow sheet :   Rationale:   to improve the patients ability to     12 min Manual Therapy:  Per flow sheet   Rationale: decrease pain, increase ROM, increase tissue extensibility and decrease trigger points to increase ease with ADL's     min Gait Training:  ___ feet with ___ device on level surfaces with ___ level of assist   Rationale: With   [] TE   [] TA   [] neuro   [] other: Patient Education: [x] Review HEP    [] Progressed/Changed HEP based on:   [] positioning   [] body mechanics   [] transfers   [] heat/ice application    [] other:      Other Objective/Functional Measures:      Pain Level (0-10 scale) post treatment: 0    ASSESSMENT/Changes in Function: cont per POC    Patient will continue to benefit from skilled PT services to modify and progress therapeutic interventions, address functional mobility deficits, address ROM deficits, address strength deficits, analyze and address soft tissue restrictions, analyze and cue movement patterns, assess and modify postural abnormalities and instruct in home and community integration to attain remaining goals. []  See Plan of Care  []  See progress note/recertification  []  See Discharge Summary         Progress towards goals / Updated goals:  Short Term Goals: To be accomplished in 2 weeks:  1. Pt will be independent and compliant w/ HEP to progress gains in PT. At eval: reviewed, progressed, and added to HEP  Current: Met, per pt report 2/14/17  2. Pt will have AAROM L shoulder flexion to > or = to 100 deg for ease w/ bathing. At eval: AAROM L shoulder flex = 45 deg  Long Term Goals:  To be accomplished in 6 weeks:  1. Pt will improve FOTO to > or = to 64 to demo improved function. At eval: FOTO = 49  2. Pt will improve AROM L shoulder flexion to > or = to 90 deg for ease w/ dressing. At eval: AROM L shoulder = 72  3. Pt will improve MMT L shoulder to > or = to 3-3+/5 for ease w/ driving. At eval: NT  4. Pt will report > or = to 75% improvement in ADL completion and no increased pain for ease w/ return to pain free function.    At eval: 0%    PLAN  [x]  Upgrade activities as tolerated     [x]  Continue plan of care  []  Update interventions per flow sheet       []  Discharge due to:_  []  Other:_      Karla Hernandez PT 2/21/2017  4:19 PM    Future Appointments  Date Time Provider Bryant Serrano   2/23/2017 3:00 PM Parker Fowler [facility]   2/28/2017 5:00 PM Brandt Winnre, PT [facility]   3/2/2017 8:00 AM Brandt Blare, PT [facility]   3[facility]2017 [facility]   3[facility]7/2017 6:00 PM Brandt Blaanil, PT [facility]   3/9/2017 8:30 AM Carlton Blare, PT [facility]   3/14/2017 5:00 PM Carlton Blare, PT [facility]   3/16/2017 11:00 AM Carlton Blare, PT [facility]   3/21/2017 5:00 PM Carlton Blare, PT [facility]   3/23/2017 11:00 AM Carlton Blare, PT [facility]

## 2017-02-23 ENCOUNTER — HOSPITAL ENCOUNTER (OUTPATIENT)
Dept: PHYSICAL THERAPY | Age: 72
Discharge: HOME | End: 2017-02-23
Payer: MEDICARE

## 2017-02-23 PROCEDURE — 97140 MANUAL THERAPY 1/> REGIONS: CPT

## 2017-02-23 PROCEDURE — 97110 THERAPEUTIC EXERCISES: CPT

## 2017-02-23 NOTE — PROGRESS NOTES
PT DAILY TREATMENT NOTE - [facility]     Patient Name: Evonne Milan  Date:2017  : 1945  [x]  Patient  Verified  Payor: Nina Villavicencio / Plan: [place] MEDICARE PART A & B / Product Type: Medicare /    In time:2:48  Out time:3:48  Total Treatment Time (min): 50  Total Timed Codes (min): 40  1:1 Treatment Time ( [address] only): 40   Visit #: 5 of 12    Treatment Area: Primary osteoarthritis, left shoulder [M19.012]    SUBJECTIVE  Pain Level (0-10 scale): 2-3  Any medication changes, allergies to medications, adverse drug reactions, diagnosis change, or new procedure performed?: [x] No    [] Yes (see summary sheet for update)  Subjective functional status/changes:   [] No changes reported  Pt reports a little more soreness under arm today.      OBJECTIVE    Modality rationale: decrease inflammation and decrease pain to improve the patients ability to perform ADL's   Min Type Additional Details    [] Estim:  []Unatt       []IFC  []Premod                        []Other:  []w/ice   []w/heat  Position:  Location:    [] Estim: []Att    []TENS instruct  []NMES                    []Other:  []w/US   []w/ice   []w/heat  Position:  Location:    []  Traction: [] Cervical       []Lumbar                       [] Prone          []Supine                       []Intermittent   []Continuous Lbs:  [] before manual  [] after manual    []  Ultrasound: []Continuous   [] Pulsed                           []1MHz   []3MHz W/cm2:  Location:    []  Iontophoresis with dexamethasone         Location: [] Take home patch   [] In clinic   10 [x]  Ice     []  heat  []  Ice massage  []  Laser   []  Anodyne Position: supine  Location: L shoulder    []  Laser with stim  []  Other:  Position:  Location:    []  Vasopneumatic Device Pressure:       [] lo [] med [] hi   Temperature: [] lo [] med [] hi   [] Skin assessment post-treatment:  []intact []redness- no adverse reaction    []redness  adverse reaction:      min []Eval                  []Re-Eval 28 min Therapeutic Exercise:  [x] See flow sheet :   Rationale: increase ROM, increase strength and improve coordination to improve the patients ability to perform ADL's     min Therapeutic Activity:  []  See flow sheet :   Rationale:   to improve the patients ability to       min Neuromuscular Re-education:  []  See flow sheet :   Rationale:   to improve the patients ability to     12 min Manual Therapy:  Per flow sheet   Rationale: decrease pain, increase ROM, increase tissue extensibility and decrease trigger points to increase ease with ADL's     min Gait Training:  ___ feet with ___ device on level surfaces with ___ level of assist   Rationale: With   [] TE   [] TA   [] neuro   [] other: Patient Education: [x] Review HEP    [] Progressed/Changed HEP based on:   [] positioning   [] body mechanics   [] transfers   [] heat/ice application    [] other:      Other Objective/Functional Measures: L shouler flex AAROM 125 deg     Pain Level (0-10 scale) post treatment: 1    ASSESSMENT/Changes in Function: cont per POC    Patient will continue to benefit from skilled PT services to modify and progress therapeutic interventions, address functional mobility deficits, address ROM deficits, address strength deficits, analyze and address soft tissue restrictions, analyze and cue movement patterns, assess and modify postural abnormalities and instruct in home and community integration to attain remaining goals. []  See Plan of Care  []  See progress note/recertification  []  See Discharge Summary         Progress towards goals / Updated goals:  Short Term Goals: To be accomplished in 2 weeks:  1. Pt will be independent and compliant w/ HEP to progress gains in PT. At [facility]: reviewed, progressed, and added to HEP  Current: Met, per pt report 2/14/17  2. Pt will have AAROM L shoulder flexion to > or = to 100 deg for ease w/ bathing.    At [facility]: AAROM L shoulder flex = 45 deg  Current: Goal met- L shouler flex AAROM 125 deg (2/23/17)  Long Term Goals: To be accomplished in 6 weeks:  1. Pt will improve FOTO to > or = to 64 to demo improved function. At eval: FOTO = 49  2. Pt will improve AROM L shoulder flexion to > or = to 90 deg for ease w/ dressing. At eval: AROM L shoulder = 72  3. Pt will improve MMT L shoulder to > or = to 3-3+/5 for ease w/ driving. At eval: NT  4. Pt will report > or = to 75% improvement in ADL completion and no increased pain for ease w/ return to pain free function.    At eval: 0%    PLAN  [x]  Upgrade activities as tolerated     [x]  Continue plan of care  []  Update interventions per flow sheet       []  Discharge due to:_  []  Other:_      David Gaines PT 2/23/2017  3:26 PM    Future Appointments  Date Time Provider Bryant Mendezi   2/28/2017 5:00 PM Catrachito Ibanez, PT [facility]   3/2/2017 8:00 AM Catrachito Ibanez, PT [facility]   [facility]2017 [facility]   3/7/2017 6:00 PM Catrachito Ibanez, PT [facility]   3/9/2017 8:30 AM Catrachito Ibanez, PT [facility]   3/14/2017 5:00 PM Catrachito Ibanez, PT [facility]   3/16/2017 11:00 AM Catrachito Ibanez, PT [facility]   3/21/2017 5:00 PM Catrachito Ibanez, PT [facility]   3/23/2017 11:00 AM Catrachito Ibanez, PT [facility]

## 2017-02-28 ENCOUNTER — HOSPITAL ENCOUNTER (OUTPATIENT)
Dept: PHYSICAL THERAPY | Age: 72
Discharge: HOME | End: 2017-02-28
Payer: MEDICARE

## 2017-02-28 PROCEDURE — 97110 THERAPEUTIC EXERCISES: CPT | Performed by: PHYSICAL THERAPIST

## 2017-02-28 PROCEDURE — 97140 MANUAL THERAPY 1/> REGIONS: CPT | Performed by: PHYSICAL THERAPIST

## 2017-02-28 NOTE — PROGRESS NOTES
PT DAILY TREATMENT NOTE - [facility]     Patient Name: Radha Rapp  Date:2017  : 1945  [x]  Patient  Verified  Payor: [place] MEDICARE / Plan: [place] MEDICARE PART A & B / Product Type: Medicare /    In time:500  Out time:551  Total Treatment Time (min): 51  Total Timed Codes (min): 41  1:1 Treatment Time ( [address] only): 39   Visit #: 6 of 12    Treatment Area: Primary osteoarthritis, left shoulder [M19.012]    SUBJECTIVE  Pain Level (0-10 scale): 0/10  Any medication changes, allergies to medications, adverse drug reactions, diagnosis change, or new procedure performed?: [x] No    [] Yes (see summary sheet for update)  Subjective functional status/changes:   [] No changes reported  Pt reports she feels that it's easier to push the car door open now    OBJECTIVE    Modality rationale: decrease inflammation and decrease pain to improve the patients ability to improve activity tolerance and mobility    Min Type Additional Details    [] Estim:  []Unatt       []IFC  []Premod                        []Other:  []w/ice   []w/heat  Position:  Location:    [] Estim: []Att    []TENS instruct  []NMES                    []Other:  []w/US   []w/ice   []w/heat  Position:  Location:    []  Traction: [] Cervical       []Lumbar                       [] Prone          []Supine                       []Intermittent   []Continuous Lbs:  [] before manual  [] after manual    []  Ultrasound: []Continuous   [] Pulsed                           []1MHz   []3MHz W/cm2:  Location:    []  Iontophoresis with dexamethasone         Location: [] Take home patch   [] In clinic   10 [x]  Ice     []  heat  []  Ice massage  []  Laser   []  Anodyne Position: long sitting  Location: L shoulder    []  Laser with stim  []  Other:  Position:  Location:    []  Vasopneumatic Device Pressure:       [] lo [] med [] hi   Temperature: [] lo [] med [] hi   [] Skin assessment post-treatment:  []intact []redness- no adverse reaction    []redness  adverse reaction:      min []Eval                  []Re-Eval       29 min Therapeutic Exercise:  [x] See flow sheet : increased per flow sheet   Rationale: increase ROM, increase strength, improve coordination and increase proprioception to improve the patients ability to decrease pain and improve ability to perform ADLs w/o pain     min Therapeutic Activity:  []  See flow sheet :   Rationale:   to improve the patients ability to       min Neuromuscular Re-education:  []  See flow sheet :   Rationale:   to improve the patients ability to     12 min Manual Therapy:  STJ mobs, TPR to L UT and LS, gh jt mobs grade 1-2 inf and post for mobility, manual str all planes per protocol and in pain free range, pec kp release   Rationale: decrease pain, increase ROM, increase tissue extensibility, decrease trigger points and increase postural awareness to improve reaching and independence w/ ADLs     min Gait Training:  ___ feet with ___ device on level surfaces with ___ level of assist   Rationale: With   [] TE   [] TA   [] neuro   [] other: Patient Education: [x] Review HEP    [] Progressed/Changed HEP based on:   [] positioning   [] body mechanics   [] transfers   [] heat/ice application    [] other:      Other Objective/Functional Measures:      Pain Level (0-10 scale) post treatment: 0/10    ASSESSMENT/Changes in Function: Pt is progressing well, noted increased mm tightness in the pec, decreased w/ manual. Reassess and continue NV as necessary. Patient will continue to benefit from skilled PT services to modify and progress therapeutic interventions, address functional mobility deficits, address ROM deficits, address strength deficits, analyze and address soft tissue restrictions, analyze and cue movement patterns, analyze and modify body mechanics/ergonomics, assess and modify postural abnormalities and instruct in home and community integration to attain remaining goals.      []  See Plan of Care  []  See progress note/recertification  []  See Discharge Summary         Progress towards goals / Updated goals:  Short Term Goals: To be accomplished in 2 weeks:  1. Pt will be independent and compliant w/ HEP to progress gains in PT. At eval: reviewed, progressed, and added to HEP  Current: Met, per pt report 2/14/17  2. Pt will have AAROM L shoulder flexion to > or = to 100 deg for ease w/ bathing. At eval: AAROM L shoulder flex = 45 deg  Current: Goal met- L shouler flex AAROM 125 deg (2/23/17)  Long Term Goals: To be accomplished in 6 weeks:  1. Pt will improve FOTO to > or = to 64 to demo improved function. At eval: FOTO = 49  Current: progressing, FOTO = 54 2/28/17  2. Pt will improve AROM L shoulder flexion to > or = to 90 deg for ease w/ dressing. At eval: AROM L shoulder = 72  3. Pt will improve MMT L shoulder to > or = to 3-3+/5 for ease w/ driving. At eval: NT  4. Pt will report > or = to 75% improvement in ADL completion and no increased pain for ease w/ return to pain free function.    At eval: 0%    PLAN  [x]  Upgrade activities as tolerated     [x]  Continue plan of care  []  Update interventions per flow sheet       []  Discharge due to:_  []  Other:_      Paco Graf PT 2/28/2017  5:16 PM    Future Appointments  Date Time Provider Bryant Mendezi   3/2/2017 8:00 AM Paco Graf PT [facility]   3/7/2017 1:00 PM LETTY Serrano [facility] SCHED   3/7/2017 6:00 PM Paco Graf PT [facility]   3/9/2017 8:30 AM Paco Graf PT [facility]   3/14/2017 5:00 PM Paco Graf PT [facility]   3/16/2017 11:00 AM Paco Graf PT [facility]   3/21/2017 5:00 PM Paco Graf PT [facility]   3/23/2017 11:00 AM SHYLA Willis [facility]

## 2017-03-02 ENCOUNTER — HOSPITAL ENCOUNTER (OUTPATIENT)
Dept: PHYSICAL THERAPY | Age: 72
Discharge: HOME | End: 2017-03-02
Payer: MEDICARE

## 2017-03-02 PROCEDURE — G8985 CARRY GOAL STATUS: HCPCS | Performed by: PHYSICAL THERAPIST

## 2017-03-02 PROCEDURE — G8984 CARRY CURRENT STATUS: HCPCS | Performed by: PHYSICAL THERAPIST

## 2017-03-02 PROCEDURE — 97110 THERAPEUTIC EXERCISES: CPT | Performed by: PHYSICAL THERAPIST

## 2017-03-02 PROCEDURE — 97140 MANUAL THERAPY 1/> REGIONS: CPT | Performed by: PHYSICAL THERAPIST

## 2017-03-02 NOTE — PROGRESS NOTES
[facility]              [address]        [place], [address] [place]   [phone] fax    Medicare Progress Report    Patient name: Everett Patel Start of Care: 2/10/17   Referral source: Guillermo Lockhart : 1945   Medical/Treatment Diagnosis: Primary osteoarthritis, left shoulder [M19.012] Onset Date:17     Prior Hospitalization: see medical history Provider#: [id number]   Medications: Verified on Patient Summary List    Comorbidities: arthritis, asthma, back pain, BMI >30, prior surgery, prosthesis/implants  Prior Level of Function: hx of limited AROM L shoulder and increased pain, lives alone  Visits from Start of Care: 7    Missed Visits: 0    Reporting Period: 2/10/17 to 3/1/17    Subjective Reports: Pt reports she doesn't have pain until she starts moving it. Also reports she is working on progressing her HEP and adjust how she jesse her seat belt    Progress towards goals / Updated goals:  Short Term Goals: To be accomplished in 2 weeks:  1. Pt will be independent and compliant w/ HEP to progress gains in PT. At eval: reviewed, progressed, and added to HEP  Current: Met, per pt report   2. Pt will have AAROM L shoulder flexion to > or = to 100 deg for ease w/ bathing. At eval: AAROM L shoulder flex = 45 deg  Current: Goal met- L shouler flex AAROM 125 deg   Long Term Goals: To be accomplished in 6 weeks:  1. Pt will improve FOTO to > or = to 64 to demo improved function. At eval: FOTO = 49  Current: progressing, FOTO = 54   2. Pt will improve AROM L shoulder flexion to > or = to 90 deg for ease w/ dressing. At eval: AROM L shoulder = 72  Current: AROM L shoulder flex = 77   3. Pt will improve MMT L shoulder to > or = to 3-3+/5 for ease w/ driving. At eval: NT  Current: progressing, grossly 3/5 in available ranges   4.  Pt will report > or = to 75% improvement in ADL completion and no increased pain for ease w/ return to pain free function. At eval: 0%  Current: progressing, pt reports 50% improvement in symptoms since [facility]        Key functional changes: improved ROM and pain      Problems/ barriers to goal attainment:      Assessment / Recommendations: Pt is progressing well and working hard to reach her goals. Continues to have some pain with movement but that it is getting less. Patient will continue to benefit from skilled PT services to modify and progress therapeutic interventions, address functional mobility deficits, address ROM deficits, address strength deficits, analyze and address soft tissue restrictions, analyze and cue movement patterns, analyze and modify body mechanics/ergonomics, assess and modify postural abnormalities and instruct in home and community integration to attain remaining goals. Problem List: pain affecting function, decrease ROM, decrease strength, decrease ADL/ functional abilitiies, decrease activity tolerance and decrease flexibility/ joint mobility   Treatment Plan: Therapeutic exercise, Therapeutic activities, Neuromuscular re-education, Physical agent/modality, Manual therapy, Patient education and Functional mobility training    Patient Goal (s) has been updated and includes: \"get back to sewing\"     Updated Goals to be accomplished in 4 weeks:  Continue with unmet goals above    Frequency / Duration: Patient to be seen 2 times per week for 4 weeks:    G-Codes (GP)  Carry   Current  CK= 40-59%    Goal  CJ= 20-39%    The severity rating is based on clinical judgment and the FOTO score.       Abhinav Bishop, PT 3/2/2017 8:58 AM

## 2017-03-02 NOTE — PROGRESS NOTES
PT DAILY TREATMENT NOTE - [facility]     Patient Name: Too Pedro  Date:3/2/2017  : 1945  [x]  Patient  Verified  [place]or: [place] MED[place] / Plan: [place] MEDICARE PART A & B / Product Type: Medicare /    In time:800  Out time:855  Total Treatment Time (min): 55  Total Timed Codes (min): 45  1:1 Treatment Time ( [address] only): 40   Visit #: 7 of 12    Treatment Area: Primary osteoarthritis, left shoulder [M19.012]    SUBJECTIVE  Pain Level (0-10 scale): 0/10  Any medication changes, allergies to medications, adverse drug reactions, diagnosis change, or new procedure performed?: [x] No    [] Yes (see summary sheet for update)  Subjective functional status/changes:   [] No changes reported  Pt reports she doesn't have pain until she starts moving it.  Also reports she is working on progressing her HEP and adjust how she jesse her seat belt    OBJECTIVE    Modality rationale: decrease inflammation and decrease pain to improve the patients ability to improve activity tolerance   Min Type Additional Details    [] Estim:  []Unatt       []IFC  []Premod                        []Other:  []w/ice   []w/heat  Position:  Location:    [] Estim: []Att    []TENS instruct  []NMES                    []Other:  []w/US   []w/ice   []w/heat  Position:  Location:    []  Traction: [] Cervical       []Lumbar                       [] Prone          []Supine                       []Intermittent   []Continuous Lbs:  [] before manual  [] after manual    []  Ultrasound: []Continuous   [] Pulsed                           []1MHz   []3MHz W/cm2:  Location:    []  Iontophoresis with dexamethasone         Location: [] Take home patch   [] In clinic   10 [x]  Ice     []  heat  []  Ice massage  []  Laser   []  Anodyne Position: supine  Location: L shoulder    []  Laser with stim  []  Other:  Position:  Location:    []  Vasopneumatic Device Pressure:       [] lo [] med [] hi   Temperature: [] lo [] med [] hi   [] Skin assessment post-treatment: []intact []redness- no adverse reaction    []redness  adverse reaction:      min []Eval                  []Re-Eval       23 min Therapeutic Exercise:  [x] See flow sheet : increased per flow sheet   Rationale: increase ROM, increase strength, improve coordination and increase proprioception to improve the patients ability to decrease pain and increase independence w/ ADLs     min Therapeutic Activity:  []  See flow sheet :   Rationale:   to improve the patients ability to       min Neuromuscular Re-education:  []  See flow sheet :   Rationale:   to improve the patients ability to     12 min Manual Therapy:  STJ mobs, TPR to L UT and LS, gh jt mobs grade 1-2 inf and post for mobility, manual str all planes per protocol and in pain free range, pec kp release   Rationale: decrease pain, increase ROM, increase tissue extensibility, decrease edema  and decrease trigger points to improve reaching and dressing     min Gait Training:  ___ feet with ___ device on level surfaces with ___ level of assist   Rationale: With   [] TE   [] TA   [] neuro   [] other: Patient Education: [x] Review HEP    [] Progressed/Changed HEP based on:   [] positioning   [] body mechanics   [] transfers   [] heat/ice application    [] other:      Other Objective/Functional Measures: see below     Pain Level (0-10 scale) post treatment: 0/10    ASSESSMENT/Changes in Function: Pt is progressing well and working hard to reach her goals. Continues to have some pain with movement but that it is getting less.      Patient will continue to benefit from skilled PT services to modify and progress therapeutic interventions, address functional mobility deficits, address ROM deficits, address strength deficits, analyze and address soft tissue restrictions, analyze and cue movement patterns, analyze and modify body mechanics/ergonomics, assess and modify postural abnormalities and instruct in home and community integration to attain remaining goals.     []  See Plan of Care  []  See progress note/recertification  []  See Discharge Summary         Progress towards goals / Updated goals:  Short Term Goals: To be accomplished in 2 weeks:  1. Pt will be independent and compliant w/ HEP to progress gains in PT. At eval: reviewed, progressed, and added to HEP  Current: Met, per pt report 2/14/17  2. Pt will have AAROM L shoulder flexion to > or = to 100 deg for ease w/ bathing. At eval: AAROM L shoulder flex = 45 deg  Current: Goal met- L shouler flex AAROM 125 deg (2/23/17)  Long Term Goals: To be accomplished in 6 weeks:  1. Pt will improve FOTO to > or = to 64 to demo improved function. At [facility]: FOTO = 49  Current: progressing, FOTO = 54 2/28/17  2. Pt will improve AROM L shoulder flexion to > or = to 90 deg for ease w/ dressing. At [facility]: AROM L shoulder = 72  Current: AROM L shoulder flex = 77 3/2/17  3. Pt will improve MMT L shoulder to > or = to 3-3+/5 for ease w/ driving. At eval: NT  Current: progressing, grossly 3/5 in available ranges 3/2/17  4. Pt will report > or = to 75% improvement in ADL completion and no increased pain for ease w/ return to pain free function.    At eval: 0%  Current: progressing, pt reports 50% improvement in symptoms since [facility] 3/2/17    PLAN  [x]  Upgrade activities as tolerated     [x]  Continue plan of care  []  Update interventions per flow sheet       []  Discharge due to:_  []  Other:_      Gordo Avila PT 3/2/2017  8:08 AM    Future Appointments  Date Time Provider Bryant Serrano   3/7/2017 1:00 PM Lelon Severin, PA [facility]   3/7/2017 6:00 PM Gordo Avila PT [facility]   3/9/2017 8:30 AM Gordo Avila PT [facility]   3/14/2017 5:00 PM Gordo Avila PT [facility]   3/16/2017 11:00 AM Gordo Avila, PT [facility]   3/21/2017 5:00 PM Gordo Avila, PT [facility]   3/23/2017 11:00 AM Gordo Avila, PT [facility]

## 2017-03-07 ENCOUNTER — HOSPITAL ENCOUNTER (OUTPATIENT)
Dept: PHYSICAL THERAPY | Age: 72
Discharge: HOME | End: 2017-03-07
Payer: MEDICARE

## 2017-03-07 ENCOUNTER — OFFICE VISIT (OUTPATIENT)
Dept: ORTHOPEDIC SURGERY | Age: 72
End: 2017-03-07

## 2017-03-07 VITALS
WEIGHT: 154 LBS | BODY MASS INDEX: 34.64 KG/M2 | HEART RATE: 80 BPM | HEIGHT: 56 IN | SYSTOLIC BLOOD PRESSURE: 152 MMHG | TEMPERATURE: 96.9 F | DIASTOLIC BLOOD PRESSURE: 89 MMHG

## 2017-03-07 DIAGNOSIS — M12.812 ROTATOR CUFF TEAR ARTHROPATHY, LEFT: ICD-10-CM

## 2017-03-07 DIAGNOSIS — M12.811 ROTATOR CUFF TEAR ARTHROPATHY OF RIGHT SHOULDER: ICD-10-CM

## 2017-03-07 DIAGNOSIS — M75.102 ROTATOR CUFF TEAR ARTHROPATHY, LEFT: ICD-10-CM

## 2017-03-07 DIAGNOSIS — M75.101 ROTATOR CUFF TEAR ARTHROPATHY OF RIGHT SHOULDER: ICD-10-CM

## 2017-03-07 DIAGNOSIS — M25.511 RIGHT SHOULDER PAIN, UNSPECIFIED CHRONICITY: Primary | ICD-10-CM

## 2017-03-07 PROCEDURE — 97140 MANUAL THERAPY 1/> REGIONS: CPT | Performed by: PHYSICAL THERAPIST

## 2017-03-07 PROCEDURE — 97110 THERAPEUTIC EXERCISES: CPT | Performed by: PHYSICAL THERAPIST

## 2017-03-07 RX ORDER — CYCLOBENZAPRINE HCL 10 MG
10 TABLET ORAL
Qty: 60 TAB | Refills: 1 | Status: SHIPPED | OUTPATIENT
Start: 2017-03-07 | End: 2018-03-06 | Stop reason: SDUPTHER

## 2017-03-07 RX ORDER — LIDOCAINE HYDROCHLORIDE 10 MG/ML
6 INJECTION INFILTRATION; PERINEURAL ONCE
Qty: 6 ML | Refills: 0
Start: 2017-03-07 | End: 2017-03-07

## 2017-03-07 NOTE — PROGRESS NOTES
PT DAILY TREATMENT NOTE - [facility]     Patient Name: Evonne Milan  Date:3/7/2017  : 1945  [x]  Patient  Verified  Payor: [place] MEDICARE / Plan: [place] MEDICARE PART A & B / Product Type: Medicare /    In time:558  Out time:644  Total Treatment Time (min): 46  Total Timed Codes (min): 46  1:1 Treatment Time ( [address] only): 40   Visit #: 1 of 8    Treatment Area: Primary osteoarthritis, left shoulder [M19.012]    SUBJECTIVE  Pain Level (0-10 scale): 0/10  Any medication changes, allergies to medications, adverse drug reactions, diagnosis change, or new procedure performed?: [x] No    [] Yes (see summary sheet for update)  Subjective functional status/changes:   [] No changes reported  Pt reports she went to the doctor today and he reported he was pleased with her progress    OBJECTIVE    Modality rationale:  to improve the patients ability to    Min Type Additional Details    [] Estim:  []Unatt       []IFC  []Premod                        []Other:  []w/ice   []w/heat  Position:  Location:    [] Estim: []Att    []TENS instruct  []NMES                    []Other:  []w/US   []w/ice   []w/heat  Position:  Location:    []  Traction: [] Cervical       []Lumbar                       [] Prone          []Supine                       []Intermittent   []Continuous Lbs:  [] before manual  [] after manual    []  Ultrasound: []Continuous   [] Pulsed                           []1MHz   []3MHz W/cm2:  Location:    []  Iontophoresis with dexamethasone         Location: [] Take home patch   [] In clinic    []  Ice     []  heat  []  Ice massage  []  Laser   []  Anodyne Position:  Location:    []  Laser with stim  []  Other:  Position:  Location:    []  Vasopneumatic Device Pressure:       [] lo [] med [] hi   Temperature: [] lo [] med [] hi   [] Skin assessment post-treatment:  []intact []redness- no adverse reaction    []redness  adverse reaction:      min []Eval                  []Re-Eval       36 min Therapeutic Exercise:  [x] See flow sheet : increased per flow sheet   Rationale: increase ROM, increase strength, improve coordination and increase proprioception to improve the patients ability to decrease pain and improve dressing     min Therapeutic Activity:  []  See flow sheet :   Rationale:   to improve the patients ability to       min Neuromuscular Re-education:  []  See flow sheet :   Rationale:   to improve the patients ability to     10 min Manual Therapy: STJ mobs, TPR to L UT and LS, gh jt mobs grade 1-2 inf and post for mobility, manual str all planes per protocol and in pain free range, pec kp release    Rationale: decrease pain, increase ROM, increase tissue extensibility, decrease trigger points and increase postural awareness to improve reaching and activity tolerance      min Gait Training:  ___ feet with ___ device on level surfaces with ___ level of assist   Rationale: With   [] TE   [] TA   [] neuro   [] other: Patient Education: [x] Review HEP    [] Progressed/Changed HEP based on:   [] positioning   [] body mechanics   [] transfers   [] heat/ice application    [] other:      Other Objective/Functional Measures:      Pain Level (0-10 scale) post treatment: 0/10    ASSESSMENT/Changes in Function: increase as indicated NV    Patient will continue to benefit from skilled PT services to modify and progress therapeutic interventions, address functional mobility deficits, address ROM deficits, address strength deficits, analyze and address soft tissue restrictions, analyze and cue movement patterns, analyze and modify body mechanics/ergonomics, assess and modify postural abnormalities and instruct in home and community integration to attain remaining goals. []  See Plan of Care  []  See progress note/recertification  []  See Discharge Summary         Progress towards goals / Updated goals:  Long Term Goals: To be accomplished in 6 weeks:  1. Pt will improve FOTO to > or = to 64 to demo improved function. At eval: FOTO = 49  Current: progressing, FOTO = 54 2/28/17  2. Pt will improve AROM L shoulder flexion to > or = to 90 deg for ease w/ dressing. At eval: AROM L shoulder = 72  Current: AROM L shoulder flex = 77 3/2/17  3. Pt will improve MMT L shoulder to > or = to 3-3+/5 for ease w/ driving. At eval: NT  Current: progressing, grossly 3/5 in available ranges 3/2/17  4. Pt will report > or = to 75% improvement in ADL completion and no increased pain for ease w/ return to pain free function.    At eval: 0%  Current: progressing, pt reports 50% improvement in symptoms since [facility] 3/2/17       PLAN  [x]  Upgrade activities as tolerated     [x]  Continue plan of care  []  Update interventions per flow sheet       []  Discharge due to:_  []  Other:_      Willem Juárez PT 3/7/2017  6:31 PM    Future Appointments  Date Time Provider Bryant Serrano   3/9/2017 8:30 AM Willem Juárez PT [facility]   3/14/2017 5:00 PM Willem Juárez PT [facility]   3/16/2017 11:00 AM Willem Juárez PT [facility]   3/21/2017 5:00 PM Willem Juárez PT [facility]   3/23/2017 11:00 AM Willem Juárez PT [facility]   4/4/2017 1:00 PM Neel Cooper MD [facility]

## 2017-03-07 NOTE — PROGRESS NOTES
Korey Christian  [birth year]   Chief Complaint   Patient presents with    Shoulder Pain     Left        HISTORY OF PRESENT ILLNESS  Korey Christian is a 66 y.o. female who presents today for reevaluation s/p Left shoulder reverse total arthroplasty on 1/11/17. Patient has been going to PT . States she has some pain with PT but feels like she is doing well. Today complains of right shoulder pain. Pain worse since her surgery. Hurts with overhead reaching, better with rest. Sometimes has significant pain at night. Patient denies any fever, chills, chest pain, shortness of breath or calf pain. There are no new illness or injuries to report since last seen in the office. .     Patient denies any fever, chills, chest pain, shortness of breath or calf pain. There are no new illness or injuries to report since last seen in the office. No changes in medications, allergies, social or family history. PHYSICAL EXAM:   Visit Vitals    /89    Pulse 80    Temp 96.9 °F (36.1 °C) (Oral)    Ht 4' 8\" (1.422 m)    Wt 154 lb (69.9 kg)    BMI 34.53 kg/m2     The patient is a well-developed, well-nourished female   in no acute distress. The patient is alert and oriented times three. The patient is alert and oriented times three. Mood and affect are normal.  LYMPHATIC: lymph nodes are not enlarged and are within normal limits  SKIN: normal in color and non tender to palpation. There are no bruises or abrasions noted. NEUROLOGICAL: Motor sensory exam is within normal limits. Reflexes are equal bilaterally.  There is normal sensation to pinprick and light touch  MUSCULOSKELETAL:  Examination Left shoulder Right shoulder   Skin Intact Intact   AC joint tenderness - -   Biceps tenderness - -   Forward flexion/Elevation  140   Active abduction  130   Glenohumeral abduction 60 70   External rotation ROM 30 70   Internal rotation ROM 20 50   Apprehension - -   Carys Relocation - -   Jerk - -   Load and Shift - -   Obriens - -   Speeds - -   Impingement sign - +   Supraspinatus/Empty Can n/a +4/5   External Rotation Strength n/a +4/5   Lift Off/Belly Press n/a -, 5/5   Neurovascular Intact Intact          PROCEDURE: After sterile prep, 6 cc of Xylocaine and 1 cc of Kenalog were injected into the right shoulder. [address]  OFFICE PROCEDURE PROGRESS NOTE        Chart reviewed for the following:  Lori LEYVA PA, have reviewed the History, Physical and updated the Allergic reactions for Cari Christoph performed immediately prior to start of procedure:  Lori LEYVA PA-C, have performed the following reviews on Laquita Larios prior to the start of the procedure:            * Patient was identified by name and date of birth   * Agreement on procedure being performed was verified  * Risks and Benefits explained to the patient  * Procedure site verified and marked as necessary  * Patient was positioned for comfort  * Consent was signed and verified     Time: 1:36 PM    Date of procedure: 3/7/2017    Procedure performed by:  Tai Humphries MD  Provider assisted by: (see medication administration)    How tolerated by patient: tolerated the procedure well with no complications    Comments: none      IMAGING: 3 view xray of left shoulder read and reviewed reveal significant degenerative changes in glenohumeral joint with high riding humerus. IMPRESSION:      ICD-10-CM ICD-9-CM    1. Rotator cuff tear arthropathy, left M12.812 716.81 REFERRAL TO PHYSICAL THERAPY   2. Right shoulder pain, unspecified chronicity M25.511 719.41 AMB POC XRAY, SHOULDER; COMPLETE, 2+   3. Rotator cuff tear arthropathy of right shoulder M12.811 716.81         PLAN:   1. Patient cont to improve with her left shoulder  2. Will cont with PT for left shoulder  3. Will rx flexeril for muscle spasms  4.  Right shoulder pain - will cont with conservative treatments at this time as she is still recovering from her surgery. Will inject with cortisone today.    RTC 1 month    Patient seen and evaluated by Dr. Jose Erickson today who agrees with treatment plan     Follow-up Disposition: Not on [facility], RADHA Souza [address] and [facility] Specialist

## 2017-03-08 ENCOUNTER — TELEPHONE (OUTPATIENT)
Dept: ORTHOPEDIC SURGERY | Age: 72
End: 2017-03-08

## 2017-03-08 NOTE — TELEPHONE ENCOUNTER
Called and spoke with Ms. Pearl Torres and advised her that dx code is correct and it does not need to be changed.

## 2017-03-08 NOTE — TELEPHONE ENCOUNTER
Pt calling in states that we are putting the wrong diagnosis code in regarding her getting therapy. Pt wants us to fix her order for therapy. Please call the patient and advise.

## 2017-03-09 ENCOUNTER — HOSPITAL ENCOUNTER (OUTPATIENT)
Dept: PHYSICAL THERAPY | Age: 72
Discharge: HOME | End: 2017-03-09
Payer: MEDICARE

## 2017-03-09 PROCEDURE — 97140 MANUAL THERAPY 1/> REGIONS: CPT | Performed by: PHYSICAL THERAPIST

## 2017-03-09 PROCEDURE — 97110 THERAPEUTIC EXERCISES: CPT | Performed by: PHYSICAL THERAPIST

## 2017-03-09 NOTE — PROGRESS NOTES
PT DAILY TREATMENT NOTE - [facility]     Patient Name: Tarah Burleson  Date:3/9/2017  : 1945  [x]  Patient  Verified  Payor: [place] MEDICARE / Plan: [place] MEDICARE PART A & B / Product Type: Medicare /    In time:826  Out time:915  Total Treatment Time (min): 49  Total Timed Codes (min): 39  1:1 Treatment Time ( [address] only): 34   Visit #: 2 of 8    Treatment Area: Primary osteoarthritis, left shoulder [M19.012]    SUBJECTIVE  Pain Level (0-10 scale): 0/10  Any medication changes, allergies to medications, adverse drug reactions, diagnosis change, or new procedure performed?: [x] No    [] Yes (see summary sheet for update)  Subjective functional status/changes:   [] No changes reported  Pt reports she's doing good today     OBJECTIVE    Modality rationale: decrease inflammation and decrease pain to improve the patients ability to improve reaching and dressing   Min Type Additional Details    [] Estim:  []Unatt       []IFC  []Premod                        []Other:  []w/ice   []w/heat  Position:  Location:    [] Estim: []Att    []TENS instruct  []NMES                    []Other:  []w/US   []w/ice   []w/heat  Position:  Location:    []  Traction: [] Cervical       []Lumbar                       [] Prone          []Supine                       []Intermittent   []Continuous Lbs:  [] before manual  [] after manual    []  Ultrasound: []Continuous   [] Pulsed                           []1MHz   []3MHz W/cm2:  Location:    []  Iontophoresis with dexamethasone         Location: [] Take home patch   [] In clinic   10 [x]  Ice     []  heat  []  Ice massage  []  Laser   []  Anodyne Position: supine  Location: L shoulder    []  Laser with stim  []  Other:  Position:  Location:    []  Vasopneumatic Device Pressure:       [] lo [] med [] hi   Temperature: [] lo [] med [] hi   [] Skin assessment post-treatment:  []intact []redness- no adverse reaction    []redness  adverse reaction:      min []Eval                  []Re-Eval 29 min Therapeutic Exercise:  [x] See flow sheet : increased per flow sheet   Rationale: increase ROM, increase strength, improve coordination and increase proprioception to improve the patients ability to improve activity tolerance and reaching     min Therapeutic Activity:  []  See flow sheet :   Rationale:   to improve the patients ability to       min Neuromuscular Re-education:  []  See flow sheet :   Rationale:   to improve the patients ability to     10 min Manual Therapy:  STJ mobs, TPR to L UT and LS, gh jt mobs grade 1-2 inf and post for mobility, manual str all planes per protocol and in pain free range, pec kp release    Rationale: decrease pain, increase ROM, increase tissue extensibility, decrease trigger points and increase postural awareness to improve activity tolerance and return to independent ADLs     min Gait Training:  ___ feet with ___ device on level surfaces with ___ level of assist   Rationale: With   [] TE   [] TA   [] neuro   [] other: Patient Education: [x] Review HEP    [] Progressed/Changed HEP based on:   [] positioning   [] body mechanics   [] transfers   [] heat/ice application    [] other:      Other Objective/Functional Measures:      Pain Level (0-10 scale) post treatment: 0/10    ASSESSMENT/Changes in Function: Continue to progress as tolerated. Patient will continue to benefit from skilled PT services to modify and progress therapeutic interventions, address functional mobility deficits, address ROM deficits, address strength deficits, analyze and address soft tissue restrictions, analyze and cue movement patterns, analyze and modify body mechanics/ergonomics, assess and modify postural abnormalities and instruct in home and community integration to attain remaining goals. []  See Plan of Care  []  See progress note/recertification  []  See Discharge Summary         Progress towards goals / Updated goals:  Long Term Goals:  To be accomplished in 6 weeks: 1. Pt will improve FOTO to > or = to 64 to demo improved function. At eval: FOTO = 49  Current: progressing, FOTO = 54 2/28/17  2. Pt will improve AROM L shoulder flexion to > or = to 90 deg for ease w/ dressing. At eval: AROM L shoulder = 72  Current: AROM L shoulder flex = 77 3/2/17  3. Pt will improve MMT L shoulder to > or = to 3-3+/5 for ease w/ driving. At eval: NT  Current: progressing, grossly 3/5 in available ranges 3/2/17  4. Pt will report > or = to 75% improvement in ADL completion and no increased pain for ease w/ return to pain free function.    At eval: 0%  Current: progressing, pt reports 50% improvement in symptoms since [facility] 3/9/17       PLAN  [x]  Upgrade activities as tolerated     [x]  Continue plan of care  []  Update interventions per flow sheet       []  Discharge due to:_  []  Other:_      Ren Xavier, PT 3/9/2017  8:42 AM    Future Appointments  Date Time Provider Bryant Serrano   3/14/2017 5:00 PM Ren July, PT [facility]   3/16/2017 11:00 AM Ren July, PT [facility]   3/21/2017 5:00 PM Ren July, PT [facility]   3/23/2017 11:00 AM Ren July, PT [facility]   4/4/2017 1:00 PM Dino Loya MD [facility] [address]

## 2017-03-13 ENCOUNTER — PATIENT OUTREACH (OUTPATIENT)
Dept: PULMONOLOGY | Age: 72
End: 2017-03-13

## 2017-03-13 RX ORDER — TRAMADOL HYDROCHLORIDE 50 MG/1
50 TABLET ORAL 2 TIMES DAILY
COMMUNITY

## 2017-03-13 NOTE — PROGRESS NOTES
Shoulder surgery bundle follow up    Surgery date: 1/11/2017  POD 61  Current dispo: home with outpt PT  Last ortho f/u: 3/7/2017  Next ortho f/u: 4/4/2017    Spoke with patient via phone, confirmed with 2 identifiers. Introduced self, role and purpose of call. Patient states she is \"doing well\". Reports good progress with PT. States L shoulder post op is now less painful then her R shoulder. Only requires pain medication (Tramadol) at bedtime. Has not needed pain medication during 2xweek PT or with home exercises. Reports ADL's are still sometimes challenging due to decreased ROM but is able to perform most tasks with modifications. States she has some trouble with putting on car seat belt and is not yet ready to return to sewing on a sewing machine due to pain with shoulder/arm positioning. Patient reports no changes or exacerbations of her asthma symptoms since surgery. Patient denies any current questions or concerns. Patient gives verbal consent to further calls. Plan of care:   1. Patient to continue twice a week PT  2. Patient to f/u with ortho on 4/4/2017 as scheduled or sooner if issues arise. 3. NN to contact patient for f/u in 14 days.

## 2017-03-14 ENCOUNTER — HOSPITAL ENCOUNTER (OUTPATIENT)
Dept: PHYSICAL THERAPY | Age: 72
Discharge: HOME | End: 2017-03-14
Payer: MEDICARE

## 2017-03-14 PROCEDURE — 97140 MANUAL THERAPY 1/> REGIONS: CPT | Performed by: PHYSICAL THERAPIST

## 2017-03-14 PROCEDURE — 97110 THERAPEUTIC EXERCISES: CPT | Performed by: PHYSICAL THERAPIST

## 2017-03-14 NOTE — PROGRESS NOTES
PT DAILY TREATMENT NOTE - [facility]     Patient Name: Perri Lucero  Date:3/14/2017  : 1945  [x]  Patient  Verified  Payor: [place] MEDICARE / Plan: VA MEDICARE PART A & B / Product Type: Medicare /    In time:454  Out time:538  Total Treatment Time (min): 44  Total Timed Codes (min): 34  1:1 Treatment Time ( [address] only): 34   Visit #: 3 of 8    Treatment Area: Primary osteoarthritis, left shoulder [M19.012]    SUBJECTIVE  Pain Level (0-10 scale): 0/10  Any medication changes, allergies to medications, adverse drug reactions, diagnosis change, or new procedure performed?: [x] No    [] Yes (see summary sheet for update)  Subjective functional status/changes:   [] No changes reported  Pt reports she still has pain with reaching up    OBJECTIVE    Modality rationale: decrease inflammation and decrease pain to improve the patients ability to improve reaching and dressing   Min Type Additional Details    [] Estim:  []Unatt       []IFC  []Premod                        []Other:  []w/ice   []w/heat  Position:  Location:    [] Estim: []Att    []TENS instruct  []NMES                    []Other:  []w/US   []w/ice   []w/heat  Position:  Location:    []  Traction: [] Cervical       []Lumbar                       [] Prone          []Supine                       []Intermittent   []Continuous Lbs:  [] before manual  [] after manual    []  Ultrasound: []Continuous   [] Pulsed                           []1MHz   []3MHz W/cm2:  Location:    []  Iontophoresis with dexamethasone         Location: [] Take home patch   [] In clinic   10 [x]  Ice     []  heat  []  Ice massage  []  Laser   []  Anodyne Position: supine  Location: L shoulder    []  Laser with stim  []  Other:  Position:  Location:    []  Vasopneumatic Device Pressure:       [] lo [] med [] hi   Temperature: [] lo [] med [] hi   [] Skin assessment post-treatment:  []intact []redness- no adverse reaction    []redness  adverse reaction:      min []Eval []Re-Eval       24 min Therapeutic Exercise:  [x] See flow sheet : increased per flow sheet   Rationale: increase ROM, increase strength, improve coordination and increase proprioception to improve the patients ability to decrease pain w/ reaching and driving     min Therapeutic Activity:  []  See flow sheet :   Rationale:   to improve the patients ability to       min Neuromuscular Re-education:  []  See flow sheet :   Rationale:   to improve the patients ability to     10 min Manual Therapy:  STJ mobs, TPR to L UT and LS, gh jt mobs grade 1-2 inf and post for mobility, manual str all planes per protocol and in pain free range, pec kp release    Rationale: decrease pain, increase ROM, increase tissue extensibility, decrease trigger points and increase postural awareness to improve ADLs and reaching     min Gait Training:  ___ feet with ___ device on level surfaces with ___ level of assist   Rationale: With   [] TE   [] TA   [] neuro   [] other: Patient Education: [x] Review HEP    [] Progressed/Changed HEP based on:   [] positioning   [] body mechanics   [] transfers   [] heat/ice application    [] other:      Other Objective/Functional Measures:      Pain Level (0-10 scale) post treatment: 0/10    ASSESSMENT/Changes in Function: increased per flow sheet to improve activity toelra    Patient will continue to benefit from skilled PT services to modify and progress therapeutic interventions, address functional mobility deficits, address ROM deficits, address strength deficits, analyze and address soft tissue restrictions, analyze and cue movement patterns, analyze and modify body mechanics/ergonomics, assess and modify postural abnormalities and instruct in home and community integration to attain remaining goals. []  See Plan of Care  []  See progress note/recertification  []  See Discharge Summary         Progress towards goals / Updated goals:  Long Term Goals: To be accomplished in 6 weeks:  1. Pt will improve FOTO to > or = to 64 to demo improved function. At eval: FOTO = 49  Current: progressing, FOTO = 54 2/28/17  2. Pt will improve AROM L shoulder flexion to > or = to 90 deg for ease w/ dressing. At eval: AROM L shoulder = 72  Current: AROM L shoulder flex = 77 3/2/17  3. Pt will improve MMT L shoulder to > or = to 3-3+/5 for ease w/ driving. At eval: NT  Current: progressing, grossly 3/5 in available ranges 3/2/17  4. Pt will report > or = to 75% improvement in ADL completion and no increased pain for ease w/ return to pain free function.    At eval: 0%  Current: progressing, pt reports 60% improvement in symptoms since [facility] 3/14/17    PLAN  [x]  Upgrade activities as tolerated     [x]  Continue plan of care  []  Update interventions per flow sheet       []  Discharge due to:_  []  Other:_      Erika Buck PT 3/14/2017  4:54 PM    Future Appointments  Date Time Provider Bryant Serrano   3/14/2017 5:00 PM Erika Buck PT [facility]   3/16/2017 11:00 AM Erika Buck PT [facility]   3/21/2017 5:00 PM Erika Buck PT [facility]   3/23/2017 11:00 AM Erika Buck PT [facility]   3/28/2017 5:30 PM Erika Buck PT [facility]   3/30/2017 9:30 AM Josephine Decker, PT [facility]   4/4/2017 1:00 PM Renetta López MD [facility]   4/4/2017 5:30 PM Erika Buck PT [facility]   4/6/2017 11:00 AM Josephine Decker, PT [facility]

## 2017-03-16 ENCOUNTER — HOSPITAL ENCOUNTER (OUTPATIENT)
Dept: PHYSICAL THERAPY | Age: 72
Discharge: HOME | End: 2017-03-16
Payer: MEDICARE

## 2017-03-16 PROCEDURE — 97140 MANUAL THERAPY 1/> REGIONS: CPT | Performed by: PHYSICAL THERAPIST

## 2017-03-16 PROCEDURE — 97110 THERAPEUTIC EXERCISES: CPT | Performed by: PHYSICAL THERAPIST

## 2017-03-16 NOTE — PROGRESS NOTES
PT DAILY TREATMENT NOTE - [facility]     Patient Name: Laquita Larios  Date:3/16/2017  : 1945  [x]  Patient  Verified  Payor: Arlet Perish / Plan: [place] MEDICARE PART A & B / Product Type: Medicare /    In RWKW:8203  Out time:1146  Total Treatment Time (min): 48  Total Timed Codes (min): 38  1:1 Treatment Time ( only): 45   Visit #: 4 of 8    Treatment Area: Primary osteoarthritis, left shoulder [M19.012]    SUBJECTIVE  Pain Level (0-10 scale): 0/10  Any medication changes, allergies to medications, adverse drug reactions, diagnosis change, or new procedure performed?: [x] No    [] Yes (see summary sheet for update)  Subjective functional status/changes:   [] No changes reported  Pt reports she was sore from last visit but feels that it's her arm getting stronger    OBJECTIVE    Modality rationale: decrease inflammation and decrease pain to improve the patients ability to improve post workout soreness   Min Type Additional Details    [] Estim:  []Unatt       []IFC  []Premod                        []Other:  []w/ice   []w/heat  Position:  Location:    [] Estim: []Att    []TENS instruct  []NMES                    []Other:  []w/US   []w/ice   []w/heat  Position:  Location:    []  Traction: [] Cervical       []Lumbar                       [] Prone          []Supine                       []Intermittent   []Continuous Lbs:  [] before manual  [] after manual    []  Ultrasound: []Continuous   [] Pulsed                           []1MHz   []3MHz W/cm2:  Location:    []  Iontophoresis with dexamethasone         Location: [] Take home patch   [] In clinic   10 [x]  Ice     []  heat  []  Ice massage  []  Laser   []  Anodyne Position: supine  Location: L shoulder    []  Laser with stim  []  Other:  Position:  Location:    []  Vasopneumatic Device Pressure:       [] lo [] med [] hi   Temperature: [] lo [] med [] hi   [] Skin assessment post-treatment:  []intact []redness- no adverse reaction    []redness  adverse reaction:      min []Eval                  []Re-Eval       28 min Therapeutic Exercise:  [x] See flow sheet : per flow sheet   Rationale: increase ROM, increase strength, improve coordination and increase proprioception to improve the patients ability to decrease pain and improved activity tolerance      min Therapeutic Activity:  []  See flow sheet :   Rationale:   to improve the patients ability to       min Neuromuscular Re-education:  []  See flow sheet :   Rationale:   to improve the patients ability to     10 min Manual Therapy: STJ mobs, TPR to L UT, LS, subscap, and teres kp, gh jt mobs grade 2 inf and post for mobility, manual str all planes per protocol and in pain free range, pec kp release     Rationale: decrease pain, increase ROM, increase tissue extensibility, decrease trigger points and increase postural awareness to improve reaching and activity tolerance      min Gait Training:  ___ feet with ___ device on level surfaces with ___ level of assist   Rationale: With   [] TE   [] TA   [] neuro   [] other: Patient Education: [x] Review HEP    [] Progressed/Changed HEP based on:   [] positioning   [] body mechanics   [] transfers   [] heat/ice application    [] other:      Other Objective/Functional Measures:      Pain Level (0-10 scale) post treatment: 0/10    ASSESSMENT/Changes in Function: Pt educated on expecting progression for a while after therapy is over. Instructed to try things that are slightly uncomfortable but do not increase pain so she can stretch and improve strength at home.      Patient will continue to benefit from skilled PT services to modify and progress therapeutic interventions, address functional mobility deficits, address ROM deficits, address strength deficits, analyze and address soft tissue restrictions, analyze and cue movement patterns, analyze and modify body mechanics/ergonomics, assess and modify postural abnormalities and instruct in home and community integration to attain remaining goals. []  See Plan of Care  []  See progress note/recertification  []  See Discharge Summary         Progress towards goals / Updated goals:  Long Term Goals: To be accomplished in 6 weeks:  1. Pt will improve FOTO to > or = to 64 to demo improved function. At eval: FOTO = 49  Current: progressing, FOTO = 54 2/28/17  2. Pt will improve AROM L shoulder flexion to > or = to 90 deg for ease w/ dressing. At eval: AROM L shoulder = 72  Current: AROM L shoulder flex = 77 3/2/17  3. Pt will improve MMT L shoulder to > or = to 3-3+/5 for ease w/ driving. At eval: NT  Current: progressing, grossly 3/5 in available ranges 3/2/17  4. Pt will report > or = to 75% improvement in ADL completion and no increased pain for ease w/ return to pain free function.    At eval: 0%  Current: progressing, pt reports 60% improvement in symptoms since [facility] 3/14/17       PLAN  [x]  Upgrade activities as tolerated     [x]  Continue plan of care  []  Update interventions per flow sheet       []  Discharge due to:_  []  Other:_      Darrell Liu PT 3/16/2017  11:42 AM    Future Appointments  Date Time Provider Bryant Serrano   3/21/2017 5:00 PM Darrell Liu PT [facility]   3/23/2017 11:00 AM Darrell Liu PT [facility]   3/28/2017 5:30 PM Darrell Liu PT [facility]   3/30/2017 9:30 AM Kushal Welch PT [facility]   [facility]2017 [facility]   4/4/2017 5:30 PM Darrell Liu PT [facility]   4/6/2017 11:00 AM Kushal Welch PT [facility]

## 2017-03-21 ENCOUNTER — HOSPITAL ENCOUNTER (OUTPATIENT)
Dept: PHYSICAL THERAPY | Age: 72
Discharge: HOME | End: 2017-03-21
Payer: MEDICARE

## 2017-03-21 PROCEDURE — 97110 THERAPEUTIC EXERCISES: CPT | Performed by: PHYSICAL THERAPIST

## 2017-03-21 PROCEDURE — 97140 MANUAL THERAPY 1/> REGIONS: CPT | Performed by: PHYSICAL THERAPIST

## 2017-03-23 ENCOUNTER — HOSPITAL ENCOUNTER (OUTPATIENT)
Dept: PHYSICAL THERAPY | Age: 72
Discharge: HOME | End: 2017-03-23
Payer: MEDICARE

## 2017-03-23 PROCEDURE — 97110 THERAPEUTIC EXERCISES: CPT | Performed by: PHYSICAL THERAPIST

## 2017-03-23 PROCEDURE — 97140 MANUAL THERAPY 1/> REGIONS: CPT | Performed by: PHYSICAL THERAPIST

## 2017-03-23 NOTE — PROGRESS NOTES
PT DAILY TREATMENT NOTE - [facility]     Patient Name: Devonte Sprague  Date:3/23/2017  : 1945  [x]  Patient  Verified  Payor: Roman Bangura / Plan: [place] MEDICARE PART A & B / Product Type: Medicare /    In time:1057  Out time:1144  Total Treatment Time (min): 47  Total Timed Codes (min): 37  1:1 Treatment Time ( [address] only): 30   Visit #: 6 of 8    Treatment Area: Primary osteoarthritis, left shoulder [M19.012]    SUBJECTIVE  Pain Level (0-10 scale): 0/10  Any medication changes, allergies to medications, adverse drug reactions, diagnosis change, or new procedure performed?: [x] No    [] Yes (see summary sheet for update)  Subjective functional status/changes:   [] No changes reported  Pt reports her arm is sore today in the muscles    OBJECTIVE    Modality rationale: decrease inflammation and decrease pain to improve the patients ability to improve mobility and post workout soreness   Min Type Additional Details    [] Estim:  []Unatt       []IFC  []Premod                        []Other:  []w/ice   []w/heat  Position:  Location:    [] Estim: []Att    []TENS instruct  []NMES                    []Other:  []w/US   []w/ice   []w/heat  Position:  Location:    []  Traction: [] Cervical       []Lumbar                       [] Prone          []Supine                       []Intermittent   []Continuous Lbs:  [] before manual  [] after manual    []  Ultrasound: []Continuous   [] Pulsed                           []1MHz   []3MHz W/cm2:  Location:    []  Iontophoresis with dexamethasone         Location: [] Take home patch   [] In clinic   10 [x]  Ice     []  heat  []  Ice massage  []  Laser   []  Anodyne Position: supine  Location: L shoulder    []  Laser with stim  []  Other:  Position:  Location:    []  Vasopneumatic Device Pressure:       [] lo [] med [] hi   Temperature: [] lo [] med [] hi   [] Skin assessment post-treatment:  []intact []redness- no adverse reaction    []redness  adverse reaction:      min []Eval []Re-Eval       29 min Therapeutic Exercise:  [x] See flow sheet : continued w/ same ex's, no increases 2' soreness   Rationale: increase ROM, increase strength, improve coordination and increase proprioception to improve the patients ability to decrease pain and improve activity tolerance      min Therapeutic Activity:  []  See flow sheet :   Rationale:   to improve the patients ability to       min Neuromuscular Re-education:  []  See flow sheet :   Rationale:   to improve the patients ability to     8 min Manual Therapy:  STJ mobs, TPR to L UT, LS, subscap, and teres kp, gh jt mobs grade 2 inf and post for mobility, manual str all planes per protocol and in pain free range, pec kp release   Rationale: decrease pain, increase ROM, increase tissue extensibility, decrease trigger points and increase postural awareness to improve activity tolerance and decrease soreness     min Gait Training:  ___ feet with ___ device on level surfaces with ___ level of assist   Rationale: With   [] TE   [] TA   [] neuro   [] other: Patient Education: [x] Review HEP    [] Progressed/Changed HEP based on:   [] positioning   [] body mechanics   [] transfers   [] heat/ice application    [] other:      Other Objective/Functional Measures:      Pain Level (0-10 scale) post treatment: 0/10    ASSESSMENT/Changes in Function: Discussed icing at home when she has increased mm soreness.  Pt reports she is going to try and mow her lawn this weekend, encouraged rest breaks to prevent overuse    Patient will continue to benefit from skilled PT services to modify and progress therapeutic interventions, address functional mobility deficits, address ROM deficits, address strength deficits, analyze and address soft tissue restrictions, analyze and cue movement patterns, analyze and modify body mechanics/ergonomics, assess and modify postural abnormalities and instruct in home and community integration to attain remaining goals.     []  See Plan of Care  []  See progress note/recertification  []  See Discharge Summary         Progress towards goals / Updated goals:  Long Term Goals: To be accomplished in 6 weeks:  1. Pt will improve FOTO to > or = to 64 to demo improved function. At eval: FOTO = 49  Current: remains, FOTO = 54 3/21/17  2. Pt will improve AROM L shoulder flexion to > or = to 90 deg for ease w/ dressing. At eval: AROM L shoulder = 72  Current: AROM L shoulder flex = 77 3/2/17  3. Pt will improve MMT L shoulder to > or = to 3-3+/5 for ease w/ driving. At eval: NT  Current: progressing, grossly 3/5 in available ranges 3/2/17  4. Pt will report > or = to 75% improvement in ADL completion and no increased pain for ease w/ return to pain free function.    At eval: 0%  Current: progressing, pt reports 60% improvement in symptoms since [facility] 3/14/17    PLAN  [x]  Upgrade activities as tolerated     [x]  Continue plan of care  []  Update interventions per flow sheet       []  Discharge due to:_  []  Other:_      Mekhi Contreras PT 3/23/2017  11:04 AM    Future Appointments  Date Time Provider Bryant Serrano   3/28/2017 5:30 PM Mekhi Contreras PT [facility]   3/30/2017 9:30 AM Victoria Kay PT [facility]   4/4/2017 1:00 PM Irma Bautista MD [facility]   4/4/2017 5:30 PM Mekhi Contreras PT [facility]   4/6/2017 11:00 AM Victoria Kay PT [facility]

## 2017-03-28 ENCOUNTER — HOSPITAL ENCOUNTER (OUTPATIENT)
Dept: PHYSICAL THERAPY | Age: 72
Discharge: HOME | End: 2017-03-28
Payer: MEDICARE

## 2017-03-28 PROCEDURE — 97110 THERAPEUTIC EXERCISES: CPT | Performed by: PHYSICAL THERAPIST

## 2017-03-28 PROCEDURE — 97140 MANUAL THERAPY 1/> REGIONS: CPT | Performed by: PHYSICAL THERAPIST

## 2017-03-28 NOTE — PROGRESS NOTES
PT DAILY TREATMENT NOTE - [facility]     Patient Name: José Manuel Flores  Date:3/28/2017  : 1945  [x]  Patient  Verified  Payor: [place] MEDICARE / Plan: [place] MEDICARE PART A & B / Product Type: Medicare /    In time:330  Out time:416  Total Treatment Time (min): 46  Total Timed Codes (min): 36  1:1 Treatment Time ( [address] only): 36   Visit #: 7 of 8    Treatment Area: Primary osteoarthritis, left shoulder [M19.012]    SUBJECTIVE  Pain Level (0-10 scale): 0/10  Any medication changes, allergies to medications, adverse drug reactions, diagnosis change, or new procedure performed?: [x] No    [] Yes (see summary sheet for update)  Subjective functional status/changes:   [] No changes reported  Pt reports she is feeling stronger and can reach her can opener better    OBJECTIVE    Modality rationale: decrease inflammation and decrease pain to improve the patients ability to improve activity tolerance and mobility    Min Type Additional Details    [] Estim:  []Unatt       []IFC  []Premod                        []Other:  []w/ice   []w/heat  Position:  Location:    [] Estim: []Att    []TENS instruct  []NMES                    []Other:  []w/US   []w/ice   []w/heat  Position:  Location:    []  Traction: [] Cervical       []Lumbar                       [] Prone          []Supine                       []Intermittent   []Continuous Lbs:  [] before manual  [] after manual    []  Ultrasound: []Continuous   [] Pulsed                           []1MHz   []3MHz W/cm2:  Location:    []  Iontophoresis with dexamethasone         Location: [] Take home patch   [] In clinic   10 [x]  Ice     []  heat  []  Ice massage  []  Laser   []  Anodyne Position: supine  Location: L shoulder    []  Laser with stim  []  Other:  Position:  Location:    []  Vasopneumatic Device Pressure:       [] lo [] med [] hi   Temperature: [] lo [] med [] hi   [] Skin assessment post-treatment:  []intact []redness- no adverse reaction    []redness  adverse reaction: min []Eval                  []Re-Eval       28 min Therapeutic Exercise:  [x] See flow sheet : increased and added per flow sheet   Rationale: increase ROM, increase strength, improve coordination and increase proprioception to improve the patients ability to improve ADLs and mobility      min Therapeutic Activity:  []  See flow sheet :   Rationale:   to improve the patients ability to       min Neuromuscular Re-education:  []  See flow sheet :   Rationale:   to improve the patients ability to     8 min Manual Therapy:  STJ mobs, TPR to L UT, LS, subscap, and teres kp, gh jt mobs grade 2 inf and post for mobility, manual str all planes per protocol and in pain free range, pec kp release   Rationale: decrease pain, increase ROM, increase tissue extensibility, decrease trigger points and increase postural awareness to improve reaching and ADLs     min Gait Training:  ___ feet with ___ device on level surfaces with ___ level of assist   Rationale: With   [] TE   [] TA   [] neuro   [] other: Patient Education: [x] Review HEP    [] Progressed/Changed HEP based on:   [] positioning   [] body mechanics   [] transfers   [] heat/ice application    [] other:      Other Objective/Functional Measures:      Pain Level (0-10 scale) post treatment: 0/10    ASSESSMENT/Changes in Function: pt progressing well. Reassess and send PN NV. Will continue 2x/3 weeks if necessary. Patient will continue to benefit from skilled PT services to modify and progress therapeutic interventions, address functional mobility deficits, address ROM deficits, address strength deficits, analyze and address soft tissue restrictions, analyze and cue movement patterns, analyze and modify body mechanics/ergonomics, assess and modify postural abnormalities and instruct in home and community integration to attain remaining goals.      []  See Plan of Care  []  See progress note/recertification  []  See Discharge Summary         Progress towards goals / Updated goals:  Long Term Goals: To be accomplished in 6 weeks:  1. Pt will improve FOTO to > or = to 64 to demo improved function. At eval: FOTO = 49  Current: remains, FOTO = 54 3/21/17  2. Pt will improve AROM L shoulder flexion to > or = to 90 deg for ease w/ dressing. At eval: AROM L shoulder = 72  Current: AROM L shoulder flex = 77 3/2/17  3. Pt will improve MMT L shoulder to > or = to 3-3+/5 for ease w/ driving. At eval: NT  Current: progressing, grossly 3/5 in available ranges 3/2/17  4. Pt will report > or = to 75% improvement in ADL completion and no increased pain for ease w/ return to pain free function.    At eval: 0%  Current: progressing, pt reports 60% improvement in symptoms since [facility] 3/14/17       PLAN  [x]  Upgrade activities as tolerated     [x]  Continue plan of care  []  Update interventions per flow sheet       []  Discharge due to:_  []  Other:_      Eli Henderson PT 3/28/2017  4:04 PM    Future Appointments  Date Time Provider Bryant Serrano   3/30/2017 9:30 AM Love Tate PT [facility]   4/4/2017 1:00 PM Mosie Dakins, MD [facility] SCHED   4/4/2017 5:30 PM Eli Henderson PT [facility]   4/6/2017 11:00 AM Love Tate PT [facility]

## 2017-03-30 ENCOUNTER — HOSPITAL ENCOUNTER (OUTPATIENT)
Dept: PHYSICAL THERAPY | Age: 72
Discharge: HOME | End: 2017-03-30
Payer: MEDICARE

## 2017-03-30 ENCOUNTER — PATIENT OUTREACH (OUTPATIENT)
Dept: ORTHOPEDIC SURGERY | Age: 72
End: 2017-03-30

## 2017-03-30 PROCEDURE — G8984 CARRY CURRENT STATUS: HCPCS

## 2017-03-30 PROCEDURE — 97110 THERAPEUTIC EXERCISES: CPT

## 2017-03-30 PROCEDURE — 97140 MANUAL THERAPY 1/> REGIONS: CPT

## 2017-03-30 PROCEDURE — G8985 CARRY GOAL STATUS: HCPCS

## 2017-03-30 NOTE — PROGRESS NOTES
[facility]              [address]        [place], [address]   [phone] fax    Medicare Progress Report    Patient name: Chun Caldera Start of Care: 2/10/2017   Referral source: Ramonita Taveras : 1945   Medical/Treatment Diagnosis: Primary osteoarthritis, left shoulder [M19.012] Onset Date:2017     Prior Hospitalization: see medical history Provider#: [id number]   Medications: Verified on Patient Summary List    Comorbidities: arthritis, asthma, back pain, BMI >30, prior surgery, prosthesis/implants  Prior Level of Function: hx of limited AROM L shoulder and increased pain, lives alone  Visits from Start of Care: 15    Missed Visits: 0    Reporting Period: 3/1/2017 to 3/30/2017    Subjective Reports: Pt reports 80% improvement in function since [facility]    Key functional changes:   Progress towards goals / Updated goals:  Long Term Goals: To be accomplished in 6 weeks:  1. Pt will improve FOTO to > or = to 64 to demo improved function. At eval: FOTO = 49  Current: remains, FOTO = 54   2. Pt will improve AROM L shoulder flexion to > or = to 90 deg for ease w/ dressing. At eval: AROM L shoulder = 72  Current: Progressing- AROM L shoulder flex = 77   3. Pt will improve MMT L shoulder to > or = to 3-3+/5 for ease w/ driving. At eval: NT  Current: Goal met- grossly 3-3+/5 in available ranges   4. Pt will report > or = to 75% improvement in ADL completion and no increased pain for ease w/ return to pain free function.    At eval: 0%  Current: Goal met, pt reports 80% improvement in symptoms/function since [facility]       Problems/ barriers to goal attainment: UT over activation, strength deficits     Assessment / Recommendations: Patient will continue to benefit from skilled PT services to modify and progress therapeutic interventions, address functional mobility deficits, address ROM deficits, address strength deficits, analyze and address soft tissue restrictions, analyze and cue movement patterns, assess and modify postural abnormalities and instruct in home and community integration to attain remaining goals. Problem List: decrease ROM, decrease strength, decrease ADL/ functional abilitiies, decrease activity tolerance and decrease flexibility/ joint mobility   Treatment Plan: Therapeutic exercise, Therapeutic activities, Neuromuscular re-education, Physical agent/modality, Manual therapy and Patient education    Patient Goal (s) has been updated and includes: Be able to raise arm a little higher     Updated Goals to be accomplished in 3 weeks:  Continue with above unmet goals    Frequency / Duration: Patient to be seen 2 times per week for 3 weeks:    G-Codes (GP)  Carry   Current  CK= 40-59%    Goal  CJ= 20-39%    The severity rating is based on clinical judgment and the FOTO score.       Juani Young, PT 3/30/2017 10:28 AM

## 2017-03-30 NOTE — PROGRESS NOTES
Shoulder surgery bundle follow up     Surgery date: 1/11/2017  POD 78  Current dispo: home with outpt PT  Last ortho f/u: 3/7/2017  Next ortho f/u: 4/4/2017    Attempted to reach patient via phone, left VM requesting a return call.      Chart review: noted patient continues to progress well with PT.

## 2017-03-30 NOTE — PROGRESS NOTES
PT DAILY TREATMENT NOTE - [facility]     Patient Name: Maggie Grullon  Date:3/30/2017  : 1945  [x]  Patient  Verified  Payor: [place] MEDICARE / Plan: [place] MEDICARE PART A & B / Product Type: Medicare /    In time:9:30  Out time:10:23  Total Treatment Time (min): 53  Total Timed Codes (min): 43  1:1 Treatment Time ( [address] only): 45   Visit #: 8 of 8    Treatment Area: Primary osteoarthritis, left shoulder [M19.012]    SUBJECTIVE  Pain Level (0-10 scale): 0  Any medication changes, allergies to medications, adverse drug reactions, diagnosis change, or new procedure performed?: [x] No    [] Yes (see summary sheet for update)  Subjective functional status/changes:   [] No changes reported  Pt reports 80% improvement in function overall since [facility]    OBJECTIVE    Modality rationale: decrease inflammation and decrease pain to improve the patients ability to perform ADL's   Min Type Additional Details    [] Estim:  []Unatt       []IFC  []Premod                        []Other:  []w/ice   []w/heat  Position:  Location:    [] Estim: []Att    []TENS instruct  []NMES                    []Other:  []w/US   []w/ice   []w/heat  Position:  Location:    []  Traction: [] Cervical       []Lumbar                       [] Prone          []Supine                       []Intermittent   []Continuous Lbs:  [] before manual  [] after manual    []  Ultrasound: []Continuous   [] Pulsed                           []1MHz   []3MHz W/cm2:  Location:    []  Iontophoresis with dexamethasone         Location: [] Take home patch   [] In clinic   10 [x]  Ice     []  heat  []  Ice massage  []  Laser   []  Anodyne Position: supine  Location: L shoulder    []  Laser with stim  []  Other:  Position:  Location:    []  Vasopneumatic Device Pressure:       [] lo [] med [] hi   Temperature: [] lo [] med [] hi   [] Skin assessment post-treatment:  []intact []redness- no adverse reaction    []redness  adverse reaction:      min []Eval                  []Re-Eval 31 min Therapeutic Exercise:  [x] See flow sheet :   Rationale: increase ROM, increase strength and improve coordination to improve the patients ability to improve activity tolerance and perform ADL's     min Therapeutic Activity:  []  See flow sheet :   Rationale:   to improve the patients ability to       min Neuromuscular Re-education:  []  See flow sheet :   Rationale:   to improve the patients ability to     12 min Manual Therapy:  Per flow sheet   Rationale: decrease pain, increase ROM, increase tissue extensibility and decrease trigger points to increase ease with ADL's     min Gait Training:  ___ feet with ___ device on level surfaces with ___ level of assist   Rationale: With   [] TE   [] TA   [] neuro   [] other: Patient Education: [x] Review HEP    [] Progressed/Changed HEP based on:   [] positioning   [] body mechanics   [] transfers   [] heat/ice application    [] other:      Other Objective/Functional Measures: see goals below      Pain Level (0-10 scale) post treatment: 0    ASSESSMENT/Changes in Function: cont per POC    Patient will continue to benefit from skilled PT services to modify and progress therapeutic interventions, address functional mobility deficits, address ROM deficits, address strength deficits, analyze and address soft tissue restrictions, analyze and cue movement patterns, assess and modify postural abnormalities and instruct in home and community integration to attain remaining goals. []  See Plan of Care  [x]  See progress note/recertification  []  See Discharge Summary         Progress towards goals / Updated goals:  Long Term Goals: To be accomplished in 6 weeks:  1. Pt will improve FOTO to > or = to 64 to demo improved function. At eval: FOTO = 49  Current: remains, FOTO = 54 3/21/17  2. Pt will improve AROM L shoulder flexion to > or = to 90 deg for ease w/ dressing.    At eval: AROM L shoulder = 72  Current: Progressing- AROM L shoulder flex = 77 (3/30/17)  3. Pt will improve MMT L shoulder to > or = to 3-3+/5 for ease w/ driving. At eval: NT  Current: Goal met- grossly 3-3+/5 in available ranges (3/30/17)  4. Pt will report > or = to 75% improvement in ADL completion and no increased pain for ease w/ return to pain free function.    At eval: 0%  Current: Goal met, pt reports 80% improvement in symptoms/function since [facility] (3/30/17)    PLAN  [x]  Upgrade activities as tolerated     [x]  Continue plan of care  []  Update interventions per flow sheet       []  Discharge due to:_  []  Other:_      Farida Sims, PT 3/30/2017  9:29 AM    Future Appointments  Date Time Provider Bryant Serrano   3/30/2017 9:30 AM Farida Sims, PT [facility]   4/4/2017 1:00 PM MD MILLIE Cope   4/4/2017 5:30 PM Nicole Armas, PT [facility]   4/6/2017 11:00 AM Farida Bethany, PT [facility]

## 2017-04-04 ENCOUNTER — OFFICE VISIT (OUTPATIENT)
Dept: ORTHOPEDIC SURGERY | Age: 72
End: 2017-04-04

## 2017-04-04 ENCOUNTER — HOSPITAL ENCOUNTER (OUTPATIENT)
Dept: PHYSICAL THERAPY | Age: 72
Discharge: HOME | End: 2017-04-04
Attending: ORTHOPAEDIC SURGERY
Payer: MEDICARE

## 2017-04-04 VITALS
BODY MASS INDEX: 35.09 KG/M2 | DIASTOLIC BLOOD PRESSURE: 65 MMHG | SYSTOLIC BLOOD PRESSURE: 125 MMHG | WEIGHT: 156 LBS | TEMPERATURE: 98.2 F | HEART RATE: 69 BPM | HEIGHT: 56 IN

## 2017-04-04 DIAGNOSIS — M75.102 ROTATOR CUFF TEAR ARTHROPATHY, LEFT: Primary | ICD-10-CM

## 2017-04-04 DIAGNOSIS — M12.811 ROTATOR CUFF TEAR ARTHROPATHY OF RIGHT SHOULDER: ICD-10-CM

## 2017-04-04 DIAGNOSIS — M75.101 ROTATOR CUFF TEAR ARTHROPATHY OF RIGHT SHOULDER: ICD-10-CM

## 2017-04-04 DIAGNOSIS — M12.812 ROTATOR CUFF TEAR ARTHROPATHY, LEFT: ICD-10-CM

## 2017-04-04 DIAGNOSIS — M75.102 ROTATOR CUFF TEAR ARTHROPATHY, LEFT: ICD-10-CM

## 2017-04-04 DIAGNOSIS — M12.812 ROTATOR CUFF TEAR ARTHROPATHY, LEFT: Primary | ICD-10-CM

## 2017-04-04 PROCEDURE — 97110 THERAPEUTIC EXERCISES: CPT | Performed by: PHYSICAL THERAPIST

## 2017-04-04 PROCEDURE — 97140 MANUAL THERAPY 1/> REGIONS: CPT | Performed by: PHYSICAL THERAPIST

## 2017-04-04 NOTE — PROGRESS NOTES
Prabha Chavez  [birth year]   Chief Complaint   Patient presents with    Shoulder Pain     Left        HISTORY OF PRESENT ILLNESS  Prabha Chavez is a 66 y.o. female who presents today for reevaluation of s/p Left shoulder reverse total arthroplasty on 1/11/17. She rates her pain 0/10 today. She received a cortisone injection at her previous office visit that provided good relief. Patient has been going to PT. States she has some pain with PT but feels like she is doing well. Today complains of right shoulder pain. Hurts with overhead reaching, better with rest. Sometimes has significant pain at night. Patient denies any fever, chills, chest pain, shortness of breath or calf pain. There are no new illness or injuries to report since last seen in the office. PHYSICAL EXAM:   Visit Vitals    /65    Pulse 69    Temp 98.2 °F (36.8 °C) (Oral)    Ht 4' 8\" (1.422 m)    Wt 156 lb (70.8 kg)    BMI 34.97 kg/m2     The patient is a well-developed, well-nourished female   in no acute distress. The patient is alert and oriented times three. The patient is alert and oriented times three. Mood and affect are normal.  LYMPHATIC: lymph nodes are not enlarged and are within normal limits  SKIN: normal in color and non tender to palpation. There are no bruises or abrasions noted. NEUROLOGICAL: Motor sensory exam is within normal limits. Reflexes are equal bilaterally.  There is normal sensation to pinprick and light touch  MUSCULOSKELETAL:  Examination Left shoulder Right shoulder   Skin Intact Intact   AC joint tenderness - -   Biceps tenderness - -   Forward flexion/Elevation  140   Active abduction  130   Glenohumeral abduction 60 70   External rotation ROM 30 70   Internal rotation ROM 20 50   Apprehension - -   Carys Relocation - -   Jerk - -   Load and Shift - -   Obriens - -   Speeds - -   Impingement sign - +   Supraspinatus/Empty Can n/a +4/5   External Rotation Strength n/a +4/5 Lift Off/Belly Press n/a -, 5/5   Neurovascular Intact Intact        IMAGING: Previous 3 view xray of left shoulder read and reviewed reveal significant degenerative changes in glenohumeral joint with high riding humerus. IMPRESSION:      ICD-10-CM ICD-9-CM    1. Rotator cuff tear arthropathy, left M12.812 716.81 REFERRAL TO PHYSICAL THERAPY   2. Rotator cuff tear arthropathy of right shoulder M12.811 716.81         PLAN: She has made good progress with physical therapy. I will have her continue PT. I instructed her to work on exercises at home. She will continue her activities as tolerated. I will see her back in 4 weeks for reevaluation. Follow-up Disposition: Not on File    Scribed by Alexandro Kayser [facility]) as dictated by Agapito Hammer MD    I, Dr. Agapito Hammer, confirm that all documentation is accurate.     Agapito Hammer M.D.   [facility]

## 2017-04-06 ENCOUNTER — APPOINTMENT (OUTPATIENT)
Dept: PHYSICAL THERAPY | Age: 72
End: 2017-04-06
Attending: ORTHOPAEDIC SURGERY
Payer: MEDICARE

## 2017-04-12 ENCOUNTER — HOSPITAL ENCOUNTER (OUTPATIENT)
Dept: PHYSICAL THERAPY | Age: 72
Discharge: HOME | End: 2017-04-12
Payer: MEDICARE

## 2017-04-12 PROCEDURE — 97140 MANUAL THERAPY 1/> REGIONS: CPT

## 2017-04-12 NOTE — PROGRESS NOTES
PT DAILY TREATMENT NOTE - [facility] 3-16    Patient Name: Alyssa Bowman  Date:2017  : 1945  [x]  Patient  Verified  Payor: Howard Ee / Plan: [place] MEDICARE PART A & B / Product Type: Medicare /    In time:12:00  Out time: 12:53  Total Treatment Time (min): 53  Total Timed Codes (min): 43  1:1 Treatment Time ( [address] only): 15   Visit #: *2 of 6    Treatment Area: Primary osteoarthritis, left shoulder [M19.012]    SUBJECTIVE  Pain Level (0-10 scale): 0/10   Any medication changes, allergies to medications, adverse drug reactions, diagnosis change, or new procedure performed?: [x] No    [] Yes (see summary sheet for update)  Subjective functional status/changes:   [] No changes reported  Patient stated she was getting some discomfort with the doorway stretch at home.      OBJECTIVE    Modality rationale: decrease edema, decrease inflammation and decrease pain to improve the patients ability to perform ADLs   Min Type Additional Details    [] Estim:  []Unatt       []IFC  []Premod                        []Other:  []w/ice   []w/heat  Position:  Location:    [] Estim: []Att    []TENS instruct  []NMES                    []Other:  []w/US   []w/ice   []w/heat  Position:  Location:    []  Traction: [] Cervical       []Lumbar                       [] Prone          []Supine                       []Intermittent   []Continuous Lbs:  [] before manual  [] after manual    []  Ultrasound: []Continuous   [] Pulsed                           []1MHz   []3MHz Location:  W/cm2:    []  Iontophoresis with dexamethasone         Location: [] Take home patch   [] In clinic   10 [x]  Ice     []  heat  []  Ice massage  []  Laser   []  Anodyne Position: supine  Location: (L) shld.     []  Laser with stim  []  Other: Position:  Location:    []  Vasopneumatic Device Pressure:       [] lo [] med [] hi   Temperature: [] lo [] med [] hi   [x] Skin assessment post-treatment:  [x]intact []redness- no adverse reaction    []redness  adverse reaction:     33 min Therapeutic Exercise:  [] See flow sheet :   Rationale: increase ROM and increase strength to improve the patients ability to perform ADLs. 10 min Manual Therapy:  Scap mobs, gentle STM to (L) UT/LS, PROM per protocol and in pain free range. Rationale: decrease pain, increase ROM and increase tissue extensibility to increase ease with ADLs. With   [] TE   [] TA   [] neuro   [] other: Patient Education: [x] Review HEP    [] Progressed/Changed HEP based on:   [] positioning   [] body mechanics   [] transfers   [] heat/ice application    [] other:      Other Objective/Functional Measures:   Had patient perform doorway stretch just on the (L) side to avoid patient reaching across the door frame, and patient stated that felt ok. Advised patient to modify doorway stretch home exercise to just on the (L) to see if that feels better, but if pain continues to persist then stop doing the exercise. (L) shld AROM flex: 82deg  Pain Level (0-10 scale) post treatment: 0/10    ASSESSMENT/Changes in Function: Patient reported no pain with PROM. Tightness noted at end range. Patient demonstrated a 5 deg improvement in active shld flex since last PN. Patient reported no discomfort at end of session. Patient will continue to benefit from skilled PT services to modify and progress therapeutic interventions, address functional mobility deficits, address ROM deficits, address strength deficits, analyze and address soft tissue restrictions, analyze and cue movement patterns and assess and modify postural abnormalities to attain remaining goals. []  See Plan of Care  []  See progress note/recertification  []  See Discharge Summary         Progress towards goals / Updated goals:  Long Term Goals: To be accomplished in 6 weeks:  1. Pt will improve FOTO to > or = to 64 to demo improved function. At PN: remains, FOTO = 54 3/21/17  2.  Pt will improve AROM L shoulder flexion to > or = to 90 deg for ease w/ dressing.    At PN: Progressing- AROM L shoulder flex = 77 (3/30/17)   Current:(L) shld AROM flex: 82deg 4/12/17    PLAN  []  Upgrade activities as tolerated     [x]  Continue plan of care  []  Update interventions per flow sheet       []  Discharge due to:_  []  Other:_      Julia Bunch, PT 4/12/2017  12:44 PM

## 2017-04-14 ENCOUNTER — HOSPITAL ENCOUNTER (OUTPATIENT)
Dept: PHYSICAL THERAPY | Age: 72
Discharge: HOME | End: 2017-04-14
Payer: MEDICARE

## 2017-04-14 PROCEDURE — 97110 THERAPEUTIC EXERCISES: CPT

## 2017-04-14 PROCEDURE — 97140 MANUAL THERAPY 1/> REGIONS: CPT

## 2017-04-14 NOTE — PROGRESS NOTES
PT DAILY TREATMENT NOTE - [facility] 3-16    Patient Name: Prabha Chavez  Date:2017  : 1945  [x]  Patient  Verified  Payor: Rob Herrera / Plan: [place] & [place] / Product Type: Medicare /    In time: 11:30 Out time: 12:15  Total Treatment Time (min): 45  Total Timed Codes (min): 35  1:1 Treatment Time ( [address] only): 24   Visit #: 3 of 6    Treatment Area: Primary osteoarthritis, left shoulder [M19.012]    SUBJECTIVE  Pain Level (0-10 scale): 0/10   Any medication changes, allergies to medications, adverse drug reactions, diagnosis change, or new procedure performed?: [x] No    [] Yes (see summary sheet for update)  Subjective functional status/changes:   [] No changes reported  Patient reported no pain in shoulder today. Patient stated she got the arm bike for home and she used it on the table and worked pretty good for her. Patient also mentioned that the incision still feels occasionally tender, but no redness or signs of infection reported by patient.      OBJECTIVE    Modality rationale: decrease edema, decrease inflammation and decrease pain to improve the patients ability to perform ADLs   Min Type Additional Details    [] Estim:  []Unatt       []IFC  []Premod                        []Other:  []w/ice   []w/heat  Position:  Location:    [] Estim: []Att    []TENS instruct  []NMES                    []Other:  []w/US   []w/ice   []w/heat  Position:  Location:    []  Traction: [] Cervical       []Lumbar                       [] Prone          []Supine                       []Intermittent   []Continuous Lbs:  [] before manual  [] after manual    []  Ultrasound: []Continuous   [] Pulsed                           []1MHz   []3MHz Location:  W/cm2:    []  Iontophoresis with dexamethasone         Location: [] Take home patch   [] In clinic   10 [x]  Ice     []  heat  []  Ice massage  []  Laser   []  Anodyne Position: seated  Location: (L) shoulder     []  Laser with stim  []  Other: Position:  Location: []  Vasopneumatic Device Pressure:       [] lo [] med [] hi   Temperature: [] lo [] med [] hi   [] Skin assessment post-treatment:  []intact []redness- no adverse reaction    []redness  adverse reaction:     25 min Therapeutic Exercise:  [x] See flow sheet :   Rationale: increase ROM and increase strength to improve the patients ability to perform ADLs    10 min Manual Therapy:  Gentle scap mobs, STM to (L) UT; PROM per protocol and pain free range. Rationale: decrease pain, increase ROM and increase tissue extensibility to increase ease with ADLs. With   [] TE   [] TA   [] neuro   [] other: Patient Education: [x] Review HEP    [] Progressed/Changed HEP based on:   [] positioning   [] body mechanics   [] transfers   [] heat/ice application    [] other:      Other Objective/Functional Measures:   Therapist observed incision and incision/skin intact. FOTO: 59  Pain Level (0-10 scale) post treatment: 0/10     ASSESSMENT/Changes in Function: FOTO improved by 5 points since last assessment. Patient stated she felt like her arm was going higher with wall slides then it normally does. Patient reported no pain at end of session. Patient will continue to benefit from skilled PT services to modify and progress therapeutic interventions, address functional mobility deficits, address ROM deficits, address strength deficits, analyze and address soft tissue restrictions, analyze and cue movement patterns and assess and modify postural abnormalities to attain remaining goals. []  See Plan of Care  []  See progress note/recertification  []  See Discharge Summary         Progress towards goals / Updated goals:  Long Term Goals: To be accomplished in 6 weeks:  1. Pt will improve FOTO to > or = to 64 to demo improved function. At PN: remains, FOTO = 59 4/14/17  2. Pt will improve AROM L shoulder flexion to > or = to 90 deg for ease w/ dressing.    At PN: Progressing- AROM L shoulder flex = 77 (3/30/17) Current:(L) thompsonld AROM flex: 82deg 4/12/17    PLAN  []  Upgrade activities as tolerated     [x]  Continue plan of care  []  Update interventions per flow sheet       []  Discharge due to:_  []  Other:_      Myrna Campbell, PT 4/14/2017  11:31 AM

## 2017-04-19 ENCOUNTER — APPOINTMENT (OUTPATIENT)
Dept: PHYSICAL THERAPY | Age: 72
End: 2017-04-19
Payer: MEDICARE

## 2017-04-21 ENCOUNTER — HOSPITAL ENCOUNTER (OUTPATIENT)
Dept: PHYSICAL THERAPY | Age: 72
Discharge: HOME | End: 2017-04-21
Payer: MEDICARE

## 2017-04-21 PROCEDURE — 97140 MANUAL THERAPY 1/> REGIONS: CPT

## 2017-04-21 PROCEDURE — 97110 THERAPEUTIC EXERCISES: CPT

## 2017-04-21 NOTE — PROGRESS NOTES
PT DAILY TREATMENT NOTE - [facility] 316    Patient Name: Neal Clark  Date:2017  : 1945  [x]  Patient  Verified  Payor: Garcie Land / Plan: [place] MEDICARE PART A & B / Product Type: Medicare /    In time: 11:28  Out time:12:15  Total Treatment Time (min): 47  Total Timed Codes (min): 37  1:1 Treatment Time ( [address] only): 18   Visit #: 4 of 6    Treatment Area: Primary osteoarthritis, left shoulder [M19.012]    SUBJECTIVE  Pain Level (0-10 scale): 0/10   Any medication changes, allergies to medications, adverse drug reactions, diagnosis change, or new procedure performed?: [x] No    [] Yes (see summary sheet for update)  Subjective functional status/changes:   [] No changes reported  Patient stated that her shoulder feels more stiff today.      OBJECTIVE    Modality rationale: decrease edema, decrease inflammation and decrease pain to improve the patients ability to perform ADLs    Min Type Additional Details    [] Estim:  []Unatt       []IFC  []Premod                        []Other:  []w/ice   []w/heat  Position:  Location:    [] Estim: []Att    []TENS instruct  []NMES                    []Other:  []w/US   []w/ice   []w/heat  Position:  Location:    []  Traction: [] Cervical       []Lumbar                       [] Prone          []Supine                       []Intermittent   []Continuous Lbs:  [] before manual  [] after manual    []  Ultrasound: []Continuous   [] Pulsed                           []1MHz   []3MHz Location:  W/cm2:    []  Iontophoresis with dexamethasone         Location: [] Take home patch   [] In clinic   10 [x]  Ice     []  heat  []  Ice massage  []  Laser   []  Anodyne Position: seated  Location: (L) shoulder     []  Laser with stim  []  Other: Position:  Location:    []  Vasopneumatic Device Pressure:       [] lo [] med [] hi   Temperature: [] lo [] med [] hi   [] Skin assessment post-treatment:  []intact []redness- no adverse reaction    []redness  adverse reaction:     29 min Therapeutic Exercise:  [x] See flow sheet :   Rationale: increase ROM and increase strength to improve the patients ability to perform ADLs. 8 min Manual Therapy:  Gentle scap mobs, STM to (L) UT, PROM within pain free range and per protocol   Rationale: decrease pain, increase ROM and increase tissue extensibility to increase ease with ADLs. With   [] TE   [] TA   [] neuro   [] other: Patient Education: [x] Review HEP    [] Progressed/Changed HEP based on:   [] positioning   [] body mechanics   [] transfers   [] heat/ice application    [] other:      Other Objective/Functional Measures:      Pain Level (0-10 scale) post treatment: 0/10    ASSESSMENT/Changes in Function: Patient reported discomfort with door way stretch today, but stated she is feeling more stiff today. Patient reported no pain at end of session. Patient will continue to benefit from skilled PT services to modify and progress therapeutic interventions, address functional mobility deficits, address ROM deficits, address strength deficits, analyze and address soft tissue restrictions, analyze and cue movement patterns and assess and modify postural abnormalities to attain remaining goals. []  See Plan of Care  []  See progress note/recertification  []  See Discharge Summary         Progress towards goals / Updated goals:  Long Term Goals: To be accomplished in 6 weeks:  1. Pt will improve FOTO to > or = to 64 to demo improved function. At PN: remains, FOTO = 59 4/14/17  2. Pt will improve AROM L shoulder flexion to > or = to 90 deg for ease w/ dressing.    At PN: Progressing- AROM L shoulder flex = 77 (3/30/17)   Current:(L) shld AROM flex: 82deg 4/12/17    PLAN  []  Upgrade activities as tolerated     [x]  Continue plan of care  []  Update interventions per flow sheet       []  Discharge due to:_  []  Other:_      Johanna Mathew, PT 4/21/2017  1:48 PM

## 2017-04-24 ENCOUNTER — HOSPITAL ENCOUNTER (OUTPATIENT)
Dept: PHYSICAL THERAPY | Age: 72
Discharge: HOME | End: 2017-04-24
Payer: MEDICARE

## 2017-04-24 PROCEDURE — G8986 CARRY D/C STATUS: HCPCS | Performed by: PHYSICAL THERAPIST

## 2017-04-24 PROCEDURE — G8984 CARRY CURRENT STATUS: HCPCS

## 2017-04-24 PROCEDURE — 97140 MANUAL THERAPY 1/> REGIONS: CPT

## 2017-04-24 PROCEDURE — 97110 THERAPEUTIC EXERCISES: CPT

## 2017-04-24 PROCEDURE — G8985 CARRY GOAL STATUS: HCPCS

## 2017-04-24 NOTE — PROGRESS NOTES
PT DAILY TREATMENT NOTE - [facility] 3-16    Patient Name: Iwona Camilo  Date:2017  : 1945  [x]  Patient  Verified  Payor: Romana Estimable / Plan: [place] MEDICARE PART A & B / Product Type: Medicare /    In time: 2:02  Out time: 3:02  Total Treatment Time (min): 60  Total Timed Codes (min): 50  1:1 Treatment Time ([facility] only): 40   Visit #: 5 of 6    Treatment Area: Primary osteoarthritis, left shoulder [M19.012]    SUBJECTIVE  Pain Level (0-10 scale):  0/10   Any medication changes, allergies to medications, adverse drug reactions, diagnosis change, or new procedure performed?: [x] No    [] Yes (see summary sheet for update)  Subjective functional status/changes:   [] No changes reported  Patient stated that she would like to make today her last day and see what the MD says at her appointment next week about continuing with PT or finishing up. Patient stated her (L) shoulder feels ok, but her other shoulder is bothering her. OBJECTIVE    Modality rationale: decrease edema, decrease inflammation and decrease pain to improve the patients ability to increase ease with DALs.     Min Type Additional Details    [] Estim:  []Unatt       []IFC  []Premod                        []Other:  []w/ice   []w/heat  Position:  Location:    [] Estim: []Att    []TENS instruct  []NMES                    []Other:  []w/US   []w/ice   []w/heat  Position:  Location:    []  Traction: [] Cervical       []Lumbar                       [] Prone          []Supine                       []Intermittent   []Continuous Lbs:  [] before manual  [] after manual    []  Ultrasound: []Continuous   [] Pulsed                           []1MHz   []3MHz Location:  W/cm2:    []  Iontophoresis with dexamethasone         Location: [] Take home patch   [] In clinic   10 [x]  Ice     []  heat  []  Ice massage  []  Laser   []  Anodyne Position: seated  Location: (L) shoulder     []  Laser with stim  []  Other: Position:  Location:    []  Vasopneumatic Device Pressure:       [] lo [] med [] hi   Temperature: [] lo [] med [] hi   [] Skin assessment post-treatment:  []intact []redness- no adverse reaction    []redness  adverse reaction:     40 min Therapeutic Exercise:  [] See flow sheet : reviewed updated HEP   Rationale: increase ROM and increase strength to improve the patients ability to perform ADLs     10 min Manual Therapy:  STM to (L) UT/scap mobs, PROM within pain free range and per protocol    Rationale: decrease pain, increase ROM and increase tissue extensibility to increase ease with ADLs. With   [] TE   [] TA   [] neuro   [] other: Patient Education: [x] Review HEP    [] Progressed/Changed HEP based on:   [] positioning   [] body mechanics   [] transfers   [] heat/ice application    [] other:      Other Objective/Functional Measures:   (L) shoulder AROM flex: 87deg   FOTO: 62     Reviewed and administered updated HEP with patient and discharge instructions incase patient is discharged from physical therapy by MD.       Pain Level (0-10 scale) post treatment: 0/10     ASSESSMENT/Changes in Function:  See PN. Avg pain level is 0/10. Patient only has discomfort when she tries to reach too high. Patient stated that she continues to have some difficulty reaching overhead into cabinets. Patient reports a 95% improvement in pain and function since [facility]. Patient will continue to benefit from skilled PT services to modify and progress therapeutic interventions, address functional mobility deficits, address ROM deficits, address strength deficits, analyze and address soft tissue restrictions, analyze and cue movement patterns, assess and modify postural abnormalities and address imbalance/dizziness to attain remaining goals. []  See Plan of Care  [x]  See progress note/recertification  []  See Discharge Summary         Progress towards goals / Updated goals:  Long Term Goals: To be accomplished in 6 weeks:  1.  Pt will improve FOTO to > or = to 64 to demo improved function. At PN: remains, FOTO = 59 4/14/17  Current: FOTO: 62  2. Pt will improve AROM L shoulder flexion to > or = to 90 deg for ease w/ dressing.    At PN: Progressing- AROM L shoulder flex = 77 (3/30/17)   Current:(L) shld AROM flex: 87deg     PLAN  []  Upgrade activities as tolerated     []  Continue plan of care  []  Update interventions per flow sheet       []  Discharge due to:_  [x]  Other:_Will await MD's recommendation regarding further PT.       Mercedes Garcia, PT 4/24/2017  2:05 PM

## 2017-04-24 NOTE — PROGRESS NOTES
[facility]              [address]        [place], [address]   [phone] fax    Medicare Progress Report    Patient name: Krupa Castillo Start of Care: 2/10/2017   Referral source: Kiracecy Kraft,* : 1945   Medical/Treatment Diagnosis: Primary osteoarthritis, left shoulder [M19.012] Onset Date: 2017     Prior Hospitalization: see medical history Provider#: [id number]   Medications: Verified on Patient Summary List    Comorbidities: arthritis, asthma, back pain, BMI >30, prior surgery, prosthesis/implants  Prior Level of Function: hx of limited AROM L shoulder and increased pain, lives alone  Visits from Start of Care: 20    Missed Visits: 1    Reporting Period: 17 to 17    Subjective Reports: Patient reports avg pain level in (R) shoulder is 0/10. Patient only has discomfort when she tries to reach too high. Patient stated that she continues to have some difficulty reaching overhead into cabinets. Patient reports a 95% improvement in pain and function since [facility]. Progress towards goals / Updated goals:  Long Term Goals: To be accomplished in 6 weeks:  1. Pt will improve FOTO to > or = to 64 to demo improved function. At PN: remains, FOTO = 59 17  Current: FOTO: 62  2. Pt will improve AROM L shoulder flexion to > or = to 90 deg for ease w/ dressing. At PN: Progressing- AROM L shoulder flex = 77 (3/30/17)   Current:(L) shld AROM flex: 87deg     Key functional changes:  Patient has demonstrated improvement in (R) shoulder AROM since last progress note. Patient continues to be limited in AROM and PROM with a firm end feel, but states that her avg pain level is 0/10. Patient also is independent and compliant with doing her HEP on a daily basis. Problems/ barriers to goal attainment: none     Assessment / Recommendations: Will await MD's recommendation regarding further PT.      Problem List: pain affecting function, decrease ROM, decrease strength, decrease ADL/ functional abilitiies, decrease activity tolerance and decrease flexibility/ joint mobility   Treatment Plan: Therapeutic exercise, Therapeutic activities, Neuromuscular re-education, Physical agent/modality, Manual therapy, Patient education and Functional mobility training    Patient Goal (s) has been updated and includes: con't with previous goal      Updated Goals to be accomplished in 2-3 weeks: Will Await MD's Recommendation regarding further PT     Frequency / Duration: Patient to be seen 2 times per week for 3 weeks:    G-Codes (GP)  Carry   Current  CJ= 20-39%    Goal  CJ= 20-39%    The severity rating is based on clinical judgment and the FOTO score.       Marcia Ramirez, PT 4/24/2017 5:29 PM

## 2017-05-02 ENCOUNTER — OFFICE VISIT (OUTPATIENT)
Dept: ORTHOPEDIC SURGERY | Age: 72
End: 2017-05-02

## 2017-05-02 VITALS
WEIGHT: 159 LBS | BODY MASS INDEX: 35.77 KG/M2 | HEART RATE: 80 BPM | TEMPERATURE: 98.8 F | SYSTOLIC BLOOD PRESSURE: 131 MMHG | DIASTOLIC BLOOD PRESSURE: 65 MMHG | HEIGHT: 56 IN

## 2017-05-02 DIAGNOSIS — M12.812 ROTATOR CUFF TEAR ARTHROPATHY, LEFT: Primary | ICD-10-CM

## 2017-05-02 DIAGNOSIS — M75.102 ROTATOR CUFF TEAR ARTHROPATHY, LEFT: Primary | ICD-10-CM

## 2017-05-02 NOTE — PROGRESS NOTES
Krupa Castillo  [birth year]   Chief Complaint   Patient presents with    Shoulder Pain     Left Total Shoulder 1-11-17        HISTORY OF PRESENT ILLNESS  Krupa Castillo is a 66 y.o. female who presents today for reevaluation of s/p Left shoulder reverse total arthroplasty on 1/11/17. She rates her pain 0/10 today. Patient has been attending PT and states she no longer had pain and has seen good improvement in her mobility. She received a cortisone injection in her right shoulder at a previous office visit that provided good relief. Hurts with overhead reaching, better with rest. Sometimes has significant pain at night. Patient denies any fever, chills, chest pain, shortness of breath or calf pain. There are no new illness or injuries to report since last seen in the office. PHYSICAL EXAM:   Visit Vitals    /65    Pulse 80    Temp 98.8 °F (37.1 °C) (Oral)    Ht 4' 8\" (1.422 m)    Wt 159 lb (72.1 kg)    BMI 35.65 kg/m2     The patient is a well-developed, well-nourished female   in no acute distress. The patient is alert and oriented times three. The patient is alert and oriented times three. Mood and affect are normal.  LYMPHATIC: lymph nodes are not enlarged and are within normal limits  SKIN: normal in color and non tender to palpation. There are no bruises or abrasions noted. NEUROLOGICAL: Motor sensory exam is within normal limits. Reflexes are equal bilaterally.  There is normal sensation to pinprick and light touch  MUSCULOSKELETAL:  Examination Left shoulder Right shoulder   Skin Intact Intact   AC joint tenderness - -   Biceps tenderness - -   Forward flexion/Elevation  140   Active abduction  130   Glenohumeral abduction 60 70   External rotation ROM 30 70   Internal rotation ROM 20 50   Apprehension - -   Carys Relocation - -   Jerk - -   Load and Shift - -   Obriens - -   Speeds - -   Impingement sign - +   Supraspinatus/Empty Can n/a +4/5   External Rotation Strength n/a +4/5   Lift Off/Belly Press n/a -, 5/5   Neurovascular Intact Intact        IMAGING: Previous 3 view xray of left shoulder read and reviewed reveal significant degenerative changes in glenohumeral joint with high riding humerus. IMPRESSION:      ICD-10-CM ICD-9-CM    1. Rotator cuff tear arthropathy, left M12.812 716.81         PLAN: Patient has seen good improvement in her pain and mobility of the left shoulder through PT. I instructed her to continue her activities as tolerated. She will continue exercises at home. Patient had a cortisone injection in March 2017 and requested information on when she may have another. I discussed with her that we would like to see at least 5-6 months of relief from the cortisone injections. She will follow up as needed. Follow-up Disposition: Not on File    Scribed by Renetta Alvarado [address]) as dictated by Smita Franklin MD    I, Dr. Smita Franklin, confirm that all documentation is accurate.     Smita Franklin M.D.   [facility]

## 2017-05-08 NOTE — PROGRESS NOTES
[facility]              [address]        [place], [address]   [phone] fax      Discharge Summary  Patient name: Adalgisa Fiore Start of Care: 2/10/17   Referral source: Audra Smith : 1945   Medical/Treatment Diagnosis: Primary osteoarthritis, left shoulder [M19.012] Onset Date:17     Prior Hospitalization: see medical history Provider#: [id number]   Medications: Verified on Patient Summary List    Comorbidities: arthritis, asthma, back pain, BMI >30, prior surgery, prosthesis/implants  Prior Level of Function: hx of limited AROM L shoulder and increased pain, lives alone  Visits from Start of Care: 20    Missed Visits: 1  Reporting Period : 2/10/17 to 17      Summary of Care:  Progress towards goals / Updated goals:  Long Term Goals: To be accomplished in 6 weeks:  1. Pt will improve FOTO to > or = to 64 to demo improved function. At PN: remains, FOTO = 59   Current: FOTO: 62  2. Pt will improve AROM L shoulder flexion to > or = to 90 deg for ease w/ dressing. At PN: Progressing- AROM L shoulder flex = 77   Current:(L) shld AROM flex: 87deg     Pt made great progress with PT and is reporting 95% improvement in symptoms since [facility]. Reports 0/10 pain on average and has returned to independent ADLs. DC to HEP at this time. G-Codes (GP)  Carry    Goal  CJ= 20-39%   D/C  CJ= 20-39%    The severity rating is based on clinical judgment and the FOTO Score score.     ASSESSMENT/RECOMMENDATIONS:  [x]Discontinue therapy: [x]Patient has reached or is progressing toward set goals      []Patient is non-compliant or has abdicated      []Due to lack of appreciable progress towards set [facility] 2017 4:23 PM

## 2017-06-02 ENCOUNTER — OFFICE VISIT (OUTPATIENT)
Dept: ORTHOPEDIC SURGERY | Age: 72
End: 2017-06-02

## 2017-06-02 VITALS
HEIGHT: 56 IN | SYSTOLIC BLOOD PRESSURE: 157 MMHG | HEART RATE: 78 BPM | BODY MASS INDEX: 37.25 KG/M2 | DIASTOLIC BLOOD PRESSURE: 65 MMHG | TEMPERATURE: 97.5 F | WEIGHT: 165.6 LBS

## 2017-06-02 DIAGNOSIS — M70.72 BURSITIS OF LEFT HIP, UNSPECIFIED BURSA: Primary | ICD-10-CM

## 2017-06-02 RX ORDER — BETAMETHASONE SODIUM PHOSPHATE AND BETAMETHASONE ACETATE 3; 3 MG/ML; MG/ML
6 INJECTION, SUSPENSION INTRA-ARTICULAR; INTRALESIONAL; INTRAMUSCULAR; SOFT TISSUE ONCE
Qty: 1 ML | Refills: 0
Start: 2017-06-02 | End: 2017-06-02

## 2017-06-02 NOTE — PROGRESS NOTES
[address] [place] [address], [address] [place] [address]  [phone]           Patient: Prabha Chavez                MRN: [id number]       SSN: [id number]  YOB: 1945        AGE: 67 y.o. SEX: female  Body mass index is 37.13 kg/(m^2). PCP: Joel Martinez MD  06/02/17      This office note has been dictated. REVIEW OF SYSTEMS:  Constitutional: Negative for fever, chills, weight loss and malaise/fatigue. HENT: Negative. Eyes: Negative. Respiratory: Negative. Cardiovascular: Negative. Gastrointestinal: No bowel incontinence or constipation. Genitourinary: No bladder incontinence or saddle anesthesia. Skin: Negative. Neurological: Negative. Endo/Heme/Allergies: Negative. Psychiatric/Behavioral: Negative. Musculoskeletal: As per HPI above. Past Medical History:   Diagnosis Date    Arthritis, left knee     Asthma     Back pain     Fuchs' corneal dystrophy     bilateral    GERD (gastroesophageal reflux disease)     Hypertension     Left shoulder pain     Osteoporosis     S/p knee replacement right          Current Outpatient Prescriptions:     traMADol (ULTRAM) 50 mg tablet, Take 50 mg by mouth every six (6) hours as needed for Pain., Disp: , Rfl:     cyclobenzaprine (FLEXERIL) 10 mg tablet, Take 1 Tab by mouth three (3) times daily as needed for Muscle Spasm(s). , Disp: 60 Tab, Rfl: 1    albuterol (PROVENTIL HFA, VENTOLIN HFA, PROAIR HFA) 90 mcg/actuation inhaler, 2 Puffs., Disp: , Rfl:     diclofenac-miSOPROStol (ARTHROTEC 75)  mg-mcg per tablet, Take 1 Tab by Mouth Twice Daily. , Disp: , Rfl:     tamsulosin (FLOMAX) 0.4 mg capsule, take 1 capsule by mouth at bedtime for 5 days, Disp: , Rfl:     promethazine (PHENERGAN) 25 mg tablet, 25 mg., Disp: , Rfl:     polyethylene glycol (MIRALAX) 17 gram packet, 17 g., Disp: , Rfl:     loratadine (CLARITIN) 10 mg tablet, 10 mg., Disp: , Rfl:     zolpidem (AMBIEN) 5 mg tablet, 5 mg., Disp: , Rfl:     brinzolamide-brimonidine (SIMBRINZA) 1-0.2 % drps, Instill 1 Drop in eye 3 Times Daily. , Disp: , Rfl:     Calcium-Cholecalciferol, D3, (CALCIUM 600 WITH VITAMIN D3) 600 mg(1,500mg) -400 unit cap, Take  by Mouth., Disp: , Rfl:     cyclobenzaprine (FLEXERIL) 10 mg tablet, take 1 tablet by mouth three times a day if needed for muscle spasm, Disp: , Rfl:     FLUoxetine (PROZAC) 20 mg capsule, 20 mg., Disp: , Rfl:     fluticasone-salmeterol (ADVAIR DISKUS) 250-50 mcg/dose diskus inhaler, 1 Puff., Disp: , Rfl:     HYDROcodone-acetaminophen (NORCO) 7.5-325 mg per tablet, Dr. Weaver Sensing, Disp: , Rfl:     senna-docusate (P-COL RITE) 8.6-50 mg per tablet, Take 1 Tab by Mouth., Disp: , Rfl:     calcium acetate (PHOSLO) 667 mg cap, Take 1 Tab by Mouth Twice Daily. , Disp: , Rfl:     ergocalciferol (ERGOCALCIFEROL) 50,000 unit capsule, Take 1 Tab by Mouth Once a Day. , Disp: , Rfl:     ferrous sulfate 325 mg (65 mg iron) tablet, Take 1 Tab by mouth daily (with breakfast). , Disp: 30 Tab, Rfl: 0    HYDROcodone-acetaminophen (NORCO) 7.5-325 mg per tablet, Take 1 Tab by mouth every six (6) hours as needed for Pain. Max Daily Amount: 4 Tabs., Disp: 40 Tab, Rfl: 0    polyethylene glycol (MIRALAX) 17 gram packet, Take 1 Packet by mouth daily as needed. , Disp: 15 Packet, Rfl: 0    senna-docusate (PERICOLACE) 8.6-50 mg per tablet, Take 1 Tab by mouth daily. , Disp: 30 Tab, Rfl: 0    OTHER, Check CBC, CMP, Mg in 3 days, results to PCP immediately, Diagnosis- FER, Disp: 1 Each, Rfl: 0    OTHER, Incentive Spirometry- use as directed, Disp: 1 Each, Rfl: 0    promethazine (PHENERGAN) 25 mg tablet, Take 1 Tab by mouth every six (6) hours as needed for Nausea., Disp: 40 Tab, Rfl: 0    albuterol (PROVENTIL HFA) 90 mcg/actuation inhaler, Take 2 Puffs by inhalation every six (6) hours as needed for Wheezing., Disp: , Rfl:     omeprazole (PRILOSEC) 20 mg capsule, Take 20 mg by mouth daily., Disp: , Rfl:     multivitamin (ONE A DAY) tablet, Take 1 Tab by mouth daily. , Disp: , Rfl:     Calcium-Cholecalciferol, D3, (CALCIUM 600 WITH VITAMIN D3) 600 mg(1,500mg) -400 unit cap, Take  by mouth two (2) times a day., Disp: , Rfl:     meclizine (ANTIVERT) 25 mg tablet, Take  by mouth three (3) times daily as needed. , Disp: , Rfl:     loratadine (CLARITIN) 10 mg tablet, Take 10 mg by mouth daily as needed for Allergies. , Disp: , Rfl:     BRINZOLAMIDE/BRIMONIDINE TART (SIMBRINZA OP), Apply 1 Drop to eye three (3) times daily. , Disp: , Rfl:     fluorometholone (FML FORTE) 0.25 % ophthalmic suspension, Administer 1 Drop to both eyes two (2) times a day., Disp: , Rfl:     cyclobenzaprine (FLEXERIL) 10 mg tablet, Take 1 Tab by mouth three (3) times daily as needed for Muscle Spasm(s). , Disp: 30 Tab, Rfl: 0    No Known Allergies    Social History     Social History    Marital status:      Spouse name: N/A    Number of children: N/A    Years of education: N/A     Occupational History    Not on file. Social History Main Topics    Smoking status: Never Smoker    Smokeless tobacco: Not on file    Alcohol use No    Drug use: No    Sexual activity: Not on file     Other Topics Concern    Not on file     Social History Narrative       Past Surgical History:   Procedure Laterality Date    HX CHOLECYSTECTOMY      HX GYN      hysterectomy    HX HEENT      corneal transplants    HX HYSTERECTOMY      HX KNEE REPLACEMENT Right     HX ORTHOPAEDIC      knee replacement     HX SHOULDER REPLACEMENT             * Patient was identified by name and date of birth   * Agreement on procedure being performed was verified  * Risks and Benefits explained to the patient  * Procedure site verified and marked as necessary  * Patient was positioned for comfort  * Consent was signed and verified  11:58 AM    The patient was instructed on post injection care.             We did see Ms. Darrell Hernandez for followup with regards to complaints of right hip pain. The patient is having laterally based hip discomfort, which began about two weeks ago, which is improving, however is still present. There is no groin pain, no thigh pain, and no radiating pain extending to the lower extremities. She has had no recent fevers, chills, systemic changes, and no injuries to report. PHYSICAL EXAMINATION: In general, the patient is alert and oriented x 3 and is in no acute distress. The patient is well-developed and well-nourished with a normal affect. The patient is afebrile. HEENT:  Head is normocephalic and atraumatic. Pupils are equally round and reactive to light and accommodation. Extraocular eye movements are intact. Neck is supple. Trachea is midline. No JVD is present. Breathing is nonlabored. Examination of the lower extremities reveals pain-free range of motion of each of the hips. She does have discomfort with palpation of the trochanteric bursa on the right side and slightly to the IT band traveling distally. There is no pain to the knee. There is no warmth, no ecchymosis, and no warmth. The surgical wound is healed up nicely at the knee. ASSESSMENT:  Right hip trochanteric bursitis. PLAN:  At this point, we are going to move forward with a cortisone injection for the right hip today. After informed and written consent, under aseptic conditions the right hip was prepped with Betadine and 6 mg of Celestone was injected without complications. The patient tolerated the injection well. She was instructed on post injection care. We will see her back in the office in three months time for evaluation. She will call with any questions or concerns that shall arise.                   JR Cristobal ESCOBAR, RADHA, ATC

## 2017-07-11 ENCOUNTER — PATIENT OUTREACH (OUTPATIENT)
Dept: CASE MANAGEMENT | Age: 72
End: 2017-07-11

## 2017-07-11 ENCOUNTER — OFFICE VISIT (OUTPATIENT)
Dept: ORTHOPEDIC SURGERY | Age: 72
End: 2017-07-11

## 2017-07-11 VITALS
SYSTOLIC BLOOD PRESSURE: 128 MMHG | DIASTOLIC BLOOD PRESSURE: 84 MMHG | HEIGHT: 56 IN | HEART RATE: 104 BPM | WEIGHT: 163.2 LBS | BODY MASS INDEX: 36.71 KG/M2 | RESPIRATION RATE: 15 BRPM

## 2017-07-11 DIAGNOSIS — M12.811 ROTATOR CUFF TEAR ARTHROPATHY OF RIGHT SHOULDER: Primary | ICD-10-CM

## 2017-07-11 DIAGNOSIS — M75.101 ROTATOR CUFF TEAR ARTHROPATHY OF RIGHT SHOULDER: Primary | ICD-10-CM

## 2017-07-11 RX ORDER — TRIAMCINOLONE ACETONIDE 40 MG/ML
80 INJECTION, SUSPENSION INTRA-ARTICULAR; INTRAMUSCULAR ONCE
Qty: 2 ML | Refills: 0
Start: 2017-07-11 | End: 2017-07-11

## 2017-07-11 RX ORDER — LIDOCAINE HYDROCHLORIDE 10 MG/ML
6 INJECTION INFILTRATION; PERINEURAL ONCE
Qty: 6 ML | Refills: 0
Start: 2017-07-11 | End: 2017-07-11

## 2017-07-11 RX ORDER — CYCLOBENZAPRINE HCL 10 MG
10 TABLET ORAL
Qty: 60 TAB | Refills: 0 | Status: SHIPPED | OUTPATIENT
Start: 2017-07-11 | End: 2018-03-06 | Stop reason: SDUPTHER

## 2017-07-11 RX ORDER — HYDROCODONE BITARTRATE AND ACETAMINOPHEN 5; 325 MG/1; MG/1
1 TABLET ORAL
Qty: 40 TAB | Refills: 0 | Status: SHIPPED | OUTPATIENT
Start: 2017-07-11 | End: 2022-09-02

## 2017-07-11 NOTE — PROGRESS NOTES
Felipe Goins  [birth year]   Chief Complaint   Patient presents with    Shoulder Pain     right        HISTORY OF PRESENT ILLNESS  Felipe Goins is a 67 y.o. female who presents today for reevaluation of right shoulder pain. She states in the last 2 weeks her pain has significantly worsened. Pain worse with any reaching. Has night pain. Denies a new injury. Has been about 5 months since her last cortisone injection. Patient denies any fever, chills, chest pain, shortness of breath or calf pain. There are no new illness or injuries to report since last seen in the office. No changes in medications, allergies, social or family history. PHYSICAL EXAM:   Visit Vitals    /84    Pulse (!) 104    Resp 15    Ht 4' 8\" (1.422 m)    Wt 163 lb 3.2 oz (74 kg)    BMI 36.59 kg/m2     The patient is a well-developed, well-nourished female   in no acute distress. The patient is alert and oriented times three. The patient is alert and oriented times three. Mood and affect are normal.  LYMPHATIC: lymph nodes are not enlarged and are within normal limits  SKIN: normal in color and non tender to palpation. There are no bruises or abrasions noted. NEUROLOGICAL: Motor sensory exam is within normal limits. Reflexes are equal bilaterally. There is normal sensation to pinprick and light touch  MUSCULOSKELETAL:  Examination Right shoulder   Skin Intact   AC joint tenderness -   Biceps tenderness -   Forward flexion/Elevation    Active abduction    Glenohumeral abduction 80 with crepitus   External rotation ROM 50   Internal rotation ROM 30   Apprehension -   Carys Relocation -   Jerk -   Load and Shift -   Obriens -   Speeds -   Impingement sign -   Supraspinatus/Empty Can +4/5   External Rotation Strength +4/5   Lift Off/Belly Press -, 5/5   Neurovascular Intact   '     PROCEDURE: After sterile prep, 6 cc of Xylocaine and 1 cc of Kenalog were injected into the right shoulder.        [facility] - [facility]  OFFICE PROCEDURE PROGRESS NOTE        Chart reviewed for the following:  Rancho LEYVA PA, have reviewed the History, Physical and updated the Allergic reactions for [address] performed immediately prior to start of procedure:  Rancho LEYVA PA-C, have performed the following reviews on Char Araujo prior to the start of the procedure:            * Patient was identified by name and date of birth   * Agreement on procedure being performed was verified  * Risks and Benefits explained to the patient  * Procedure site verified and marked as necessary  * Patient was positioned for comfort  * Consent was signed and verified     Time: 1:59 PM    Date of procedure: 7/11/2017    Procedure performed by:  Carmin Lanes MD  Provider assisted by: (see medication administration)    How tolerated by patient: tolerated the procedure well with no complications    Comments: none      IMPRESSION:      ICD-10-CM ICD-9-CM    1. Rotator cuff tear arthropathy of right shoulder M12.811 716.81         PLAN:   1. Patient with acute exacerbation of right shoulder pain. Will cont with conservative treatments. Next step would be to consider total shoulder arthroplasty. Would have to order MRI at that time  2. Yes cortisone injection indicated today: injection to right shoulder  3. No Physical/Occupational Therapy indicated today  4. No diagnostic test indicated today:   5. No durable medical equipment indicated today  6. No referral indicated today   7.  No medications indicated today:     RTC 4 weeks if pain persists    Follow-up Disposition: Not on [address] [place] [address], RADHA [facility] Artist and Spine Specialist

## 2017-07-11 NOTE — PROGRESS NOTES
I have individually seen and examined Federica Phillips in addition to the physician assistant. She rates her pain 8/10 today. Patient's right shoulder pain has recently returned. She has received good relief from the cortisone injection she received in March 2017. She is requesting a cortisone injection today. Assessment:    ICD-10-CM ICD-9-CM    1. Rotator cuff tear arthropathy of right shoulder M12.811 716.81         Plan:  Patient's right shoulder pain is worsening. After discussing the treatment options, I injected the right shoulder with cortisone today. She will return in 3 weeks if her pain continues.     Scribed by Kamran Bingham ([address]) as dictated by Tani Hernandez MD     I, Dr. Tani Hernandez, confirm that all documentation is accurate.     Tani Hernandez M.D.   [facility] Specialist

## 2017-10-31 ENCOUNTER — OFFICE VISIT (OUTPATIENT)
Dept: ORTHOPEDIC SURGERY | Age: 72
End: 2017-10-31

## 2017-10-31 VITALS
HEART RATE: 66 BPM | DIASTOLIC BLOOD PRESSURE: 87 MMHG | WEIGHT: 166.2 LBS | OXYGEN SATURATION: 97 % | HEIGHT: 56 IN | SYSTOLIC BLOOD PRESSURE: 140 MMHG | RESPIRATION RATE: 18 BRPM | TEMPERATURE: 97.5 F | BODY MASS INDEX: 37.39 KG/M2

## 2017-10-31 DIAGNOSIS — M25.511 RIGHT SHOULDER PAIN, UNSPECIFIED CHRONICITY: ICD-10-CM

## 2017-10-31 DIAGNOSIS — M75.102 ROTATOR CUFF TEAR ARTHROPATHY, LEFT: ICD-10-CM

## 2017-10-31 DIAGNOSIS — M75.101 ROTATOR CUFF TEAR ARTHROPATHY OF RIGHT SHOULDER: Primary | ICD-10-CM

## 2017-10-31 DIAGNOSIS — M12.811 ROTATOR CUFF TEAR ARTHROPATHY OF RIGHT SHOULDER: Primary | ICD-10-CM

## 2017-10-31 DIAGNOSIS — M25.512 LEFT SHOULDER PAIN, UNSPECIFIED CHRONICITY: ICD-10-CM

## 2017-10-31 DIAGNOSIS — M12.812 ROTATOR CUFF TEAR ARTHROPATHY, LEFT: ICD-10-CM

## 2017-10-31 RX ORDER — POTASSIUM CHLORIDE 20 MEQ/1
20 TABLET, EXTENDED RELEASE ORAL DAILY
COMMUNITY
Start: 2017-04-19

## 2017-10-31 RX ORDER — TRIAMCINOLONE ACETONIDE 40 MG/ML
40 INJECTION, SUSPENSION INTRA-ARTICULAR; INTRAMUSCULAR ONCE
Qty: 1 ML | Refills: 0
Start: 2017-10-31 | End: 2017-10-31

## 2017-10-31 RX ORDER — TRIAMTERENE AND HYDROCHLOROTHIAZIDE 37.5; 25 MG/1; MG/1
CAPSULE ORAL
COMMUNITY
Start: 2017-04-19 | End: 2019-12-27

## 2017-10-31 NOTE — PROGRESS NOTES
Nishant Cavanaugh  [birth year]   Chief Complaint   Patient presents with    Shoulder Pain     Melvin        HISTORY OF PRESENT ILLNESS  Nishant Cavanaugh is a 67 y.o. female who presents today for reevaluation of left shoulder pain. At her last OV in July, patient's left shoulder was injected with cortisone. She rates her pain 0/10 today. She reports receiving good relief from previous injection in the right shoulder until about 2 weeks ago. She has been having some trouble sleeping at night due to the right shoulder pain. S/p Left shoulder reverse total arthroplasty on 1/11/17. She reports having no pain with the left shoulder but still some limitations in her mobility. Patient denies any fever, chills, chest pain, shortness of breath or calf pain. There are no new illness or injuries to report since last seen in the office. There are no changes to medications, allergies, family or social history. PHYSICAL EXAM:   Visit Vitals    /87    Pulse 66    Temp 97.5 °F (36.4 °C) (Oral)    Resp 18    Ht 4' 8\" (1.422 m)    Wt 166 lb 3.2 oz (75.4 kg)    SpO2 97%    BMI 37.26 kg/m2     The patient is a well-developed, well-nourished female   in no acute distress. The patient is alert and oriented times three. The patient is alert and oriented times three. Mood and affect are normal.  LYMPHATIC: lymph nodes are not enlarged and are within normal limits  SKIN: normal in color and non tender to palpation. There are no bruises or abrasions noted. NEUROLOGICAL: Motor sensory exam is within normal limits. Reflexes are equal bilaterally.  There is normal sensation to pinprick and light touch  MUSCULOSKELETAL:  Examination Right shoulder   Skin Intact   AC joint tenderness -   Biceps tenderness -   Forward flexion/Elevation    Active abduction    Glenohumeral abduction 80 with crepitus   External rotation ROM 50   Internal rotation ROM 30   Apprehension -   Carys Relocation -   Jerk -   Load and Shift -   Julianna Side -   Speeds -   Impingement sign -   Supraspinatus/Empty Can +4/5   External Rotation Strength +4/5   Lift Off/Belly Press -, 5/5   Neurovascular Intact          PROCEDURE: After sterile prep, 6 cc of Xylocaine and 1 cc of Kenalog were injected into the right shoulder. [address]  OFFICE PROCEDURE PROGRESS NOTE        Chart reviewed for the following:  I, Janeth Xie MD, have reviewed the History, Physical and updated the Allergic reactions for [address] [place] [address] performed immediately prior to start of procedure:  Elizabeth Medina MD, have performed the following reviews on Everett Patel prior to the start of the procedure:            * Patient was identified by name and date of birth   * Agreement on procedure being performed was verified  * Risks and Benefits explained to the patient  * Procedure site verified and marked as necessary  * Patient was positioned for comfort  * Consent was signed and verified     Time: 10:48 AM    Date of procedure: 10/31/2017    Procedure performed by:  Janeth Xie MD    Provider assisted by: (see medication administration)    How tolerated by patient: tolerated the procedure well with no complications    Comments: none      Previous 3 view xray of left shoulder read and reviewed reveal significant degenerative changes in glenohumeral joint with high riding humerus. IMPRESSION:      ICD-10-CM ICD-9-CM    1. Rotator cuff tear arthropathy of right shoulder M12.811 716.81 TRIAMCINOLONE ACETONIDE INJ      triamcinolone acetonide (KENALOG) 40 mg/mL injection      DRAIN/INJECT LARGE JOINT/BURSA   2. Right shoulder pain, unspecified chronicity M25.511 719.41 CANCELED: AMB POC XRAY, SHOULDER; COMPLETE, 2+   3. Left shoulder pain, unspecified chronicity M25.512 719.41 CANCELED: AMB POC XRAY, SHOULDER; COMPLETE, 2+   4.  Rotator cuff tear arthropathy, left M12.812 716.81 PLAN:   1. Patient's right shoulder pain worsening. Discussed proceeding with total shoulder arthroplasty if limited relief from cortisone injection. Risk factors include: htn  2. Yes cortisone injection indicated today: RIGHT SHOULDER   3. No Physical/Occupational Therapy indicated today  4. No diagnostic test indicated today  5. No durable medical equipment indicated today  6. No referral indicated today   7. No medications indicated today  8. No Narcotic indicated today     RTC 4 weeks if pain continues  Follow-up Disposition: Not on File    Scribed by Shahid Daniels [facility]) as dictated by Everett Call MD    I, Dr. Everett Call, confirm that all documentation is accurate.     Everett Call M.D.   [facility]

## 2017-10-31 NOTE — PATIENT INSTRUCTIONS
Joint Pain: Care Instructions  Your Care Instructions    Many people have small aches and pains from overuse or injury to muscles and joints. Joint injuries often happen during sports or recreation, work tasks, or projects around the home. An overuse injury can happen when you put too much stress on a joint or when you do an activity that stresses the joint over and over, such as using the computer or rowing a boat. You can take action at home to help your muscles and joints get better. You should feel better in 1 to 2 weeks, but it can take 3 months or more to heal completely. Follow-up care is a key part of your treatment and safety. Be sure to make and go to all appointments, and call your doctor if you are having problems. It's also a good idea to know your test results and keep a list of the medicines you take. How can you care for yourself at home? · Do not put weight on the injured joint for at least a day or two. · For the first day or two after an injury, do not take hot showers or baths, and do not use hot packs. The heat could make swelling worse. · Put ice or a cold pack on the sore joint for 10 to 20 minutes at a time. Try to do this every 1 to 2 hours for the next 3 days (when you are awake) or until the swelling goes down. Put a thin cloth between the ice and your skin. · Wrap the injury in an elastic bandage. Do not wrap it too tightly because this can cause more swelling. · Prop up the sore joint on a pillow when you ice it or anytime you sit or lie down during the next 3 days. Try to keep it above the level of your heart. This will help reduce swelling. · Take an over-the-counter pain medicine, such as acetaminophen (Tylenol), ibuprofen (Advil, Motrin), or naproxen (Aleve). Read and follow all instructions on the label. · After 1 or 2 days of rest, begin moving the joint gently.  While the joint is still healing, you can begin to exercise using activities that do not strain or hurt the painful joint. When should you call for help? Call your doctor now or seek immediate medical care if:  ? · You have signs of infection, such as:  ¨ Increased pain, swelling, warmth, and redness. ¨ Red streaks leading from the joint. ¨ A fever. ? Watch closely for changes in your health, and be sure to contact your doctor if:  ? · Your movement or symptoms are not getting better after 1 to 2 weeks of home treatment. Where can you learn more? Go to http://niya-leigh.info/. Enter P205 in the search box to learn more about \"Joint Pain: Care Instructions. \"  Current as of: March 21, 2017  Content Version: 11.4  © 8983-8801 [employer]. Care instructions adapted under license by [employer] (which disclaims liability or warranty for this information). If you have questions about a medical condition or this instruction, always ask your healthcare professional. [address] any warranty or liability for your use of this information.

## 2017-11-30 NOTE — TELEPHONE ENCOUNTER
PATIENT IS HAVING R HIP PAIN DOWN THE LEG INTO HER ANKLE. SHE SAID IT IS PAINFUL DURING THE DAY BUT AT NIGHT IS WORST. SHE STATES IT EVEN HURTS FOR THE COVER TO TOUCH HER LEG WHEN SHE GOES TO BED. THE LAST FEW NIGHT SHE HAS HAD TO TAKE ONE OF HER PAIN PILLS. SHE SAID THAT ED TOLD HER TO CALL HIM WITH HER CONCERNS.     PLEASE ADVISE

## 2017-12-01 RX ORDER — METHYLPREDNISOLONE 4 MG/1
TABLET ORAL
Qty: 1 DOSE PACK | Refills: 0 | Status: SHIPPED | OUTPATIENT
Start: 2017-12-01 | End: 2019-12-27

## 2017-12-01 NOTE — TELEPHONE ENCOUNTER
Sounds like it is her back causing the problem. Can see in the office next week.     Call in medrol dose pack  Sig: as directed  #1, [place]

## 2017-12-01 NOTE — TELEPHONE ENCOUNTER
Called patient to let her know her prescription can be picked up at her pharmacy, and also to make an appointment to see Edis Stewart next week.

## 2017-12-07 ENCOUNTER — OFFICE VISIT (OUTPATIENT)
Dept: ORTHOPEDIC SURGERY | Age: 72
End: 2017-12-07

## 2017-12-07 VITALS
DIASTOLIC BLOOD PRESSURE: 74 MMHG | WEIGHT: 164 LBS | HEIGHT: 56 IN | SYSTOLIC BLOOD PRESSURE: 156 MMHG | OXYGEN SATURATION: 97 % | HEART RATE: 65 BPM | BODY MASS INDEX: 36.89 KG/M2 | TEMPERATURE: 98.8 F | RESPIRATION RATE: 18 BRPM

## 2017-12-07 DIAGNOSIS — M12.262 VILLONODULAR SYNOVITIS OF LEFT LOWER LEG: ICD-10-CM

## 2017-12-07 DIAGNOSIS — Z96.653 HISTORY OF BILATERAL KNEE REPLACEMENT: Primary | ICD-10-CM

## 2017-12-07 DIAGNOSIS — M12.261 VILLONODULAR SYNOVITIS OF RIGHT LOWER LEG: ICD-10-CM

## 2017-12-07 DIAGNOSIS — M70.61 TROCHANTERIC BURSITIS OF RIGHT HIP: ICD-10-CM

## 2017-12-07 NOTE — PROGRESS NOTES
[address] [place] [address]  [phone]           Patient: Homer Floyd                MRN: [id number]       SSN: [id number]  YOB: 1945        AGE: 67 y.o. SEX: female  Body mass index is 36.77 kg/(m^2). PCP: Lenora Christianson MD  12/07/17      This office note has been dictated. REVIEW OF SYSTEMS:  Constitutional: Negative for fever, chills, weight loss and malaise/fatigue. HENT: Negative. Eyes: Negative. Respiratory: Negative. Cardiovascular: Negative. Gastrointestinal: No bowel incontinence or constipation. Genitourinary: No bladder incontinence or saddle anesthesia. Skin: Negative. Neurological: Negative. Endo/Heme/Allergies: Negative. Psychiatric/Behavioral: Negative. Musculoskeletal: As per HPI above. Past Medical History:   Diagnosis Date    Arthritis, left knee     Asthma     Back pain     Fuchs' corneal dystrophy     bilateral    GERD (gastroesophageal reflux disease)     Hypertension     Left shoulder pain     Osteoporosis     S/p knee replacement right          Current Outpatient Prescriptions:     methylPREDNISolone (MEDROL DOSEPACK) 4 mg tablet, Per dose pack instructions, Disp: 1 Dose Pack, Rfl: 0    potassium chloride (K-DUR, KLOR-CON) 20 mEq tablet, 20 mEq., Disp: , Rfl:     triamterene-hydroCHLOROthiazide (DYAZIDE) 37.5-25 mg per capsule, Take 1 Cap by Mouth Once a Day. , Disp: , Rfl:     cyclobenzaprine (FLEXERIL) 10 mg tablet, Take 1 Tab by mouth three (3) times daily as needed for Muscle Spasm(s). , Disp: 60 Tab, Rfl: 0    HYDROcodone-acetaminophen (NORCO) 5-325 mg per tablet, Take 1 Tab by mouth every six (6) hours as needed for Pain.  Max Daily Amount: 4 Tabs., Disp: 40 Tab, Rfl: 0    traMADol (ULTRAM) 50 mg tablet, Take 50 mg by mouth every six (6) hours as needed for Pain., Disp: , Rfl:     cyclobenzaprine (FLEXERIL) 10 mg tablet, Take 1 Tab by mouth three (3) times daily as needed for Muscle Spasm(s). , Disp: 60 Tab, Rfl: 1    albuterol (PROVENTIL HFA, VENTOLIN HFA, PROAIR HFA) 90 mcg/actuation inhaler, 2 Puffs., Disp: , Rfl:     diclofenac-miSOPROStol (ARTHROTEC 75)  mg-mcg per tablet, Take 1 Tab by Mouth Twice Daily. , Disp: , Rfl:     tamsulosin (FLOMAX) 0.4 mg capsule, take 1 capsule by mouth at bedtime for 5 days, Disp: , Rfl:     promethazine (PHENERGAN) 25 mg tablet, 25 mg., Disp: , Rfl:     polyethylene glycol (MIRALAX) 17 gram packet, 17 g., Disp: , Rfl:     loratadine (CLARITIN) 10 mg tablet, 10 mg., Disp: , Rfl:     zolpidem (AMBIEN) 5 mg tablet, 5 mg., Disp: , Rfl:     brinzolamide-brimonidine (SIMBRINZA) 1-0.2 % drps, Instill 1 Drop in eye 3 Times Daily. , Disp: , Rfl:     Calcium-Cholecalciferol, D3, (CALCIUM 600 WITH VITAMIN D3) 600 mg(1,500mg) -400 unit cap, Take  by Mouth., Disp: , Rfl:     cyclobenzaprine (FLEXERIL) 10 mg tablet, take 1 tablet by mouth three times a day if needed for muscle spasm, Disp: , Rfl:     FLUoxetine (PROZAC) 20 mg capsule, 20 mg., Disp: , Rfl:     fluticasone-salmeterol (ADVAIR DISKUS) 250-50 mcg/dose diskus inhaler, 1 Puff., Disp: , Rfl:     HYDROcodone-acetaminophen (NORCO) 7.5-325 mg per tablet, Dr. Elias Wolff, Disp: , Rfl:     senna-docusate (P-COL RITE) 8.6-50 mg per tablet, Take 1 Tab by Mouth., Disp: , Rfl:     calcium acetate (PHOSLO) 667 mg cap, Take 1 Tab by Mouth Twice Daily. , Disp: , Rfl:     ergocalciferol (ERGOCALCIFEROL) 50,000 unit capsule, Take 1 Tab by Mouth Once a Day. , Disp: , Rfl:     ferrous sulfate 325 mg (65 mg iron) tablet, Take 1 Tab by mouth daily (with breakfast). , Disp: 30 Tab, Rfl: 0    polyethylene glycol (MIRALAX) 17 gram packet, Take 1 Packet by mouth daily as needed. , Disp: 15 Packet, Rfl: 0    senna-docusate (PERICOLACE) 8.6-50 mg per tablet, Take 1 Tab by mouth daily. , Disp: 30 Tab, Rfl: 0    OTHER, Check CBC, CMP, Mg in 3 days, results to PCP immediately, Diagnosis- FER, Disp: 1 Each, Rfl: 0    OTHER, Incentive Spirometry- use as directed, Disp: 1 Each, Rfl: 0    promethazine (PHENERGAN) 25 mg tablet, Take 1 Tab by mouth every six (6) hours as needed for Nausea., Disp: 40 Tab, Rfl: 0    albuterol (PROVENTIL HFA) 90 mcg/actuation inhaler, Take 2 Puffs by inhalation every six (6) hours as needed for Wheezing., Disp: , Rfl:     omeprazole (PRILOSEC) 20 mg capsule, Take 20 mg by mouth daily. , Disp: , Rfl:     multivitamin (ONE A DAY) tablet, Take 1 Tab by mouth daily. , Disp: , Rfl:     Calcium-Cholecalciferol, D3, (CALCIUM 600 WITH VITAMIN D3) 600 mg(1,500mg) -400 unit cap, Take  by mouth two (2) times a day., Disp: , Rfl:     meclizine (ANTIVERT) 25 mg tablet, Take  by mouth three (3) times daily as needed. , Disp: , Rfl:     loratadine (CLARITIN) 10 mg tablet, Take 10 mg by mouth daily as needed for Allergies. , Disp: , Rfl:     BRINZOLAMIDE/BRIMONIDINE TART (SIMBRINZA OP), Apply 1 Drop to eye three (3) times daily. , Disp: , Rfl:     fluorometholone (FML FORTE) 0.25 % ophthalmic suspension, Administer 1 Drop to both eyes two (2) times a day., Disp: , Rfl:     cyclobenzaprine (FLEXERIL) 10 mg tablet, Take 1 Tab by mouth three (3) times daily as needed for Muscle Spasm(s). , Disp: 30 Tab, Rfl: 0    HYDROcodone-acetaminophen (NORCO) 7.5-325 mg per tablet, Take 1 Tab by mouth every six (6) hours as needed for Pain. Max Daily Amount: 4 Tabs., Disp: 40 Tab, Rfl: 0    No Known Allergies    Social History     Social History    Marital status:      Spouse name: N/A    Number of children: N/A    Years of education: N/A     Occupational History    Not on file.      Social History Main Topics    Smoking status: Never Smoker    Smokeless tobacco: Never Used    Alcohol use No    Drug use: No    Sexual activity: Not on file     Other Topics Concern    Not on file     Social History Narrative       Past Surgical History:   Procedure Laterality Date    HX CHOLECYSTECTOMY      HX GYN      hysterectomy    HX HEENT      corneal transplants    HX HYSTERECTOMY      HX KNEE REPLACEMENT Right     HX ORTHOPAEDIC      knee replacement     HX SHOULDER REPLACEMENT               We did see Ms. Richmond Harley for followup with regards to multiple complaints. She does have some hand problems, hip bursitis, and she has knee replacements. She was having aches and pains in multiple areas. She has had injections for bursitis in the past, which gave her good relief. She had some radicular symptomatology, and I placed her on a Medrol Dosepak, which has helped her significantly. She is really having no pain throughout her body right now. She is feeling quite well. She is out doing her normal daily activities without complications. She has had no recent fevers, chills, systemic changes, or injuries to report, and no chest pain or shortness of breath. PHYSICAL EXAMINATION:  In general, the patient is alert and oriented x 3 in no acute distress. The patient is well-developed, well-nourished, with a normal affect. The patient is afebrile. HEENT:  Head is normocephalic and atraumatic. Pupils are equally round and reactive to light and accommodation. Extraocular eye movements are intact. Neck is supple. Trachea is midline. No JVD is present. Breathing is nonlabored. Examination of the lower extremities reveals pain-free range of motion of the hips. There is no pain to palpation of the greater trochanteric bursae. There is negative straight leg raise. There is negative calf tenderness. There is negative Ambroses. There is no evidence of DVT present. Examination of each knee reveals the skin is intact. There is no ecchymosis, no warmth, and no signs for infection or cellulitis present. She does have just slight laxity to mid-flexion to the right knee. There is no pain associated. ASSESSMENT:      1. Status post bilateral knee replacements.    2. Trochanteric bursitis right hip. 3. Right shoulder end-staged arthritis. PLAN:  At this point, the patient is doing quite well. She does understand we can do a cortisone injection for the hip if it does recur. She will call us if anything occurs.                JR Cristobal ESCOBAR, PAAlexandraC, ATC

## 2017-12-20 ENCOUNTER — TELEPHONE (OUTPATIENT)
Dept: ORTHOPEDIC SURGERY | Age: 72
End: 2017-12-20

## 2017-12-20 NOTE — TELEPHONE ENCOUNTER
Patient called saying that she was having pain in her hip and leg and wanted to know if maybe she could get a shot. She was last seen 12/07/17 Please advise patient [phone].

## 2017-12-22 ENCOUNTER — OFFICE VISIT (OUTPATIENT)
Dept: ORTHOPEDIC SURGERY | Age: 72
End: 2017-12-22

## 2017-12-22 VITALS
HEIGHT: 56 IN | OXYGEN SATURATION: 99 % | HEART RATE: 73 BPM | RESPIRATION RATE: 16 BRPM | BODY MASS INDEX: 37.34 KG/M2 | DIASTOLIC BLOOD PRESSURE: 89 MMHG | WEIGHT: 166 LBS | SYSTOLIC BLOOD PRESSURE: 186 MMHG

## 2017-12-22 DIAGNOSIS — M70.61 TROCHANTERIC BURSITIS OF RIGHT HIP: Primary | ICD-10-CM

## 2017-12-22 RX ORDER — HYDROCODONE BITARTRATE AND ACETAMINOPHEN 7.5; 325 MG/1; MG/1
1 TABLET ORAL
Qty: 28 TAB | Refills: 0 | Status: SHIPPED | OUTPATIENT
Start: 2017-12-22 | End: 2018-03-06 | Stop reason: SDUPTHER

## 2017-12-22 RX ORDER — BETAMETHASONE SODIUM PHOSPHATE AND BETAMETHASONE ACETATE 3; 3 MG/ML; MG/ML
6 INJECTION, SUSPENSION INTRA-ARTICULAR; INTRALESIONAL; INTRAMUSCULAR; SOFT TISSUE ONCE
Qty: 1 ML | Refills: 0
Start: 2017-12-22 | End: 2017-12-22

## 2017-12-22 NOTE — PROGRESS NOTES
[address], [address] [place] [address]  [phone]           Patient: Nathaniel Wilkinson                MRN: [id number]       SSN: [id number]  YOB: 1945        AGE: 67 y.o. SEX: female  Body mass index is 37.22 kg/(m^2). PCP: Sera Koenig MD  12/22/17      This office note has been dictated. REVIEW OF SYSTEMS:  Constitutional: Negative for fever, chills, weight loss and malaise/fatigue. HENT: Negative. Eyes: Negative. Respiratory: Negative. Cardiovascular: Negative. Gastrointestinal: No bowel incontinence or constipation. Genitourinary: No bladder incontinence or saddle anesthesia. Skin: Negative. Neurological: Negative. Endo/Heme/Allergies: Negative. Psychiatric/Behavioral: Negative. Musculoskeletal: As per HPI above. Past Medical History:   Diagnosis Date    Arthritis, left knee     Asthma     Back pain     Fuchs' corneal dystrophy     bilateral    GERD (gastroesophageal reflux disease)     Hypertension     Left shoulder pain     Osteoporosis     S/p knee replacement right          Current Outpatient Prescriptions:     betamethasone (CELESTONE SOLUSPAN) 6 mg/mL injection, 1 mL by IntraBURSal route once for 1 dose., Disp: 1 mL, Rfl: 0    potassium chloride (K-DUR, KLOR-CON) 20 mEq tablet, 20 mEq., Disp: , Rfl:     triamterene-hydroCHLOROthiazide (DYAZIDE) 37.5-25 mg per capsule, Take 1 Cap by Mouth Once a Day. , Disp: , Rfl:     traMADol (ULTRAM) 50 mg tablet, Take 50 mg by mouth every six (6) hours as needed for Pain., Disp: , Rfl:     cyclobenzaprine (FLEXERIL) 10 mg tablet, Take 1 Tab by mouth three (3) times daily as needed for Muscle Spasm(s). , Disp: 60 Tab, Rfl: 1    albuterol (PROVENTIL HFA, VENTOLIN HFA, PROAIR HFA) 90 mcg/actuation inhaler, 2 Puffs., Disp: , Rfl:     diclofenac-miSOPROStol (ARTHROTEC 75)  mg-mcg per tablet, Take 1 Tab by Mouth Twice Daily. , Disp: , Rfl:     tamsulosin (FLOMAX) 0.4 mg capsule, take 1 capsule by mouth at bedtime for 5 days, Disp: , Rfl:     promethazine (PHENERGAN) 25 mg tablet, 25 mg., Disp: , Rfl:     polyethylene glycol (MIRALAX) 17 gram packet, 17 g., Disp: , Rfl:     loratadine (CLARITIN) 10 mg tablet, 10 mg., Disp: , Rfl:     zolpidem (AMBIEN) 5 mg tablet, 5 mg., Disp: , Rfl:     brinzolamide-brimonidine (SIMBRINZA) 1-0.2 % drps, Instill 1 Drop in eye 3 Times Daily. , Disp: , Rfl:     Calcium-Cholecalciferol, D3, (CALCIUM 600 WITH VITAMIN D3) 600 mg(1,500mg) -400 unit cap, Take  by Mouth., Disp: , Rfl:     cyclobenzaprine (FLEXERIL) 10 mg tablet, take 1 tablet by mouth three times a day if needed for muscle spasm, Disp: , Rfl:     FLUoxetine (PROZAC) 20 mg capsule, 20 mg., Disp: , Rfl:     fluticasone-salmeterol (ADVAIR DISKUS) 250-50 mcg/dose diskus inhaler, 1 Puff., Disp: , Rfl:     HYDROcodone-acetaminophen (NORCO) 7.5-325 mg per tablet, Dr. Sigrid Avila, Disp: , Rfl:     senna-docusate (P-COL RITE) 8.6-50 mg per tablet, Take 1 Tab by Mouth., Disp: , Rfl:     calcium acetate (PHOSLO) 667 mg cap, Take 1 Tab by Mouth Twice Daily. , Disp: , Rfl:     ergocalciferol (ERGOCALCIFEROL) 50,000 unit capsule, Take 1 Tab by Mouth Once a Day. , Disp: , Rfl:     ferrous sulfate 325 mg (65 mg iron) tablet, Take 1 Tab by mouth daily (with breakfast). , Disp: 30 Tab, Rfl: 0    polyethylene glycol (MIRALAX) 17 gram packet, Take 1 Packet by mouth daily as needed. , Disp: 15 Packet, Rfl: 0    senna-docusate (PERICOLACE) 8.6-50 mg per tablet, Take 1 Tab by mouth daily. , Disp: 30 Tab, Rfl: 0    OTHER, Check CBC, CMP, Mg in 3 days, results to PCP immediately, Diagnosis- FER, Disp: 1 Each, Rfl: 0    OTHER, Incentive Spirometry- use as directed, Disp: 1 Each, Rfl: 0    promethazine (PHENERGAN) 25 mg tablet, Take 1 Tab by mouth every six (6) hours as needed for Nausea., Disp: 40 Tab, Rfl: 0    albuterol (PROVENTIL HFA) 90 mcg/actuation inhaler, Take 2 Puffs by inhalation every six (6) hours as needed for Wheezing., Disp: , Rfl:     omeprazole (PRILOSEC) 20 mg capsule, Take 20 mg by mouth daily. , Disp: , Rfl:     multivitamin (ONE A DAY) tablet, Take 1 Tab by mouth daily. , Disp: , Rfl:     Calcium-Cholecalciferol, D3, (CALCIUM 600 WITH VITAMIN D3) 600 mg(1,500mg) -400 unit cap, Take  by mouth two (2) times a day., Disp: , Rfl:     meclizine (ANTIVERT) 25 mg tablet, Take  by mouth three (3) times daily as needed. , Disp: , Rfl:     loratadine (CLARITIN) 10 mg tablet, Take 10 mg by mouth daily as needed for Allergies. , Disp: , Rfl:     BRINZOLAMIDE/BRIMONIDINE TART (SIMBRINZA OP), Apply 1 Drop to eye three (3) times daily. , Disp: , Rfl:     fluorometholone (FML FORTE) 0.25 % ophthalmic suspension, Administer 1 Drop to both eyes two (2) times a day., Disp: , Rfl:     methylPREDNISolone (MEDROL DOSEPACK) 4 mg tablet, Per dose pack instructions (Patient not taking: Reported on 12/22/2017), Disp: 1 Dose Pack, Rfl: 0    cyclobenzaprine (FLEXERIL) 10 mg tablet, Take 1 Tab by mouth three (3) times daily as needed for Muscle Spasm(s). (Patient not taking: Reported on 12/22/2017), Disp: 60 Tab, Rfl: 0    HYDROcodone-acetaminophen (NORCO) 5-325 mg per tablet, Take 1 Tab by mouth every six (6) hours as needed for Pain. Max Daily Amount: 4 Tabs. (Patient not taking: Reported on 12/22/2017), Disp: 40 Tab, Rfl: 0    HYDROcodone-acetaminophen (NORCO) 7.5-325 mg per tablet, Take 1 Tab by mouth every six (6) hours as needed for Pain. Max Daily Amount: 4 Tabs. (Patient not taking: Reported on 12/22/2017), Disp: 40 Tab, Rfl: 0    cyclobenzaprine (FLEXERIL) 10 mg tablet, Take 1 Tab by mouth three (3) times daily as needed for Muscle Spasm(s).  (Patient not taking: Reported on 12/22/2017), Disp: 30 Tab, Rfl: 0    No Known Allergies    Social History     Social History    Marital status:      Spouse name: N/A    Number of children: N/A    Years of education: N/A     Occupational History    Not on file. Social History Main Topics    Smoking status: Never Smoker    Smokeless tobacco: Never Used    Alcohol use No    Drug use: No    Sexual activity: Not on file     Other Topics Concern    Not on file     Social History Narrative       Past Surgical History:   Procedure Laterality Date    HX CHOLECYSTECTOMY      HX GYN      hysterectomy    HX HEENT      corneal transplants    HX HYSTERECTOMY      HX KNEE REPLACEMENT Right     HX ORTHOPAEDIC      knee replacement     HX SHOULDER REPLACEMENT             * Patient was identified by name and date of birth   * Agreement on procedure being performed was verified  * Risks and Benefits explained to the patient  * Procedure site verified and marked as necessary  * Patient was positioned for comfort  * Consent was signed and verified  9:27 AM    The patient was instructed on post injection care. We did see Ms. Tori Salazar for followup with regards to her right hip. The patient does have bursitis of her hip. She was recently on a Medrol Dosepak, which helped for about two weeks. She was having some radicular symptomatology, which is improved. She is having some residual discomfort down her lower extremity but better than it was. She has had no groin pain or thigh pain. She has had no recent fevers, chills, systemic changes, or injuries to report. PHYSICAL EXAMINATION:  In general, the patient is alert and oriented x 3 in no acute distress. The patient is well-developed, well-nourished, with a normal affect. The patient is afebrile. HEENT:  Head is normocephalic and atraumatic. Pupils are equally round and reactive to light and accommodation. Extraocular eye movements are intact. Neck is supple. Trachea is midline. No JVD is present. Breathing is nonlabored. Examination of the lower extremities reveals pain-free range of motion of each of the hips.   She does have discomfort to palpation of the greater trochanteric bursa on the right. There is negative straight leg raise. There is negative calf tenderness. There is negative Ambroses. There is no evidence of DVT present. ASSESSMENT:  Trochanteric bursitis, right hip. PLAN:  At this point, we are going to move forward with a cortisone injection for the right hip today. Under aseptic conditions, after informed and written consent, and appropriate time out performed, with ultrasound-guided assistance, the right hip was prepped with Betadine and 6 mg of Celestone was injected to the right trochanteric bursa without complications. The patient tolerated the injection well. The patient was instructed on post injection care. She was given a prescription for Norco today in the office. We will have her follow up with us in about four weeks time to  the efficacy of the injection. We may repeat the injection upon her return.                     JR Cristobal ESCOBAR, RADHA, ATC

## 2018-07-16 ENCOUNTER — PATIENT MESSAGE (OUTPATIENT)
Dept: ORTHOPEDIC SURGERY | Age: 73
End: 2018-07-16

## 2018-07-18 NOTE — TELEPHONE ENCOUNTER
From: Nathaniel Wilkinson  To: Yesenia Peres MD  Sent: 7/16/2018 9:54 PM EDT  Subject: Non-Urgent Medical Question    I have been on vacation the past week which involved a lot of walking and 8 hours in car stopping several times. Today both knees are hurting to point it is hard to walk. I had a few pain pills left that did not help. Very painful to bend back. Much like after surgery . I have also iced today. Please advise if you can order something until I can get an appointment. When I call it takes weeks. Thanks for your help.

## 2018-07-25 NOTE — TELEPHONE ENCOUNTER
Message given to  at [facility] to call patient and schedule an appointment per PA [facility].

## 2018-08-06 ENCOUNTER — OFFICE VISIT (OUTPATIENT)
Dept: ORTHOPEDIC SURGERY | Age: 73
End: 2018-08-06

## 2018-08-06 VITALS
HEIGHT: 56 IN | RESPIRATION RATE: 16 BRPM | SYSTOLIC BLOOD PRESSURE: 138 MMHG | HEART RATE: 77 BPM | OXYGEN SATURATION: 92 % | DIASTOLIC BLOOD PRESSURE: 67 MMHG | BODY MASS INDEX: 37.7 KG/M2 | WEIGHT: 167.6 LBS | TEMPERATURE: 98.1 F

## 2018-08-06 DIAGNOSIS — M12.811 ROTATOR CUFF TEAR ARTHROPATHY OF RIGHT SHOULDER: Primary | ICD-10-CM

## 2018-08-06 DIAGNOSIS — M75.101 ROTATOR CUFF TEAR ARTHROPATHY OF RIGHT SHOULDER: Primary | ICD-10-CM

## 2018-08-06 DIAGNOSIS — M25.511 RIGHT SHOULDER PAIN, UNSPECIFIED CHRONICITY: ICD-10-CM

## 2018-08-06 PROBLEM — E66.01 SEVERE OBESITY (BMI 35.0-39.9): Status: ACTIVE | Noted: 2018-08-06

## 2018-08-06 RX ORDER — TRIAMCINOLONE ACETONIDE 40 MG/ML
40 INJECTION, SUSPENSION INTRA-ARTICULAR; INTRAMUSCULAR ONCE
Qty: 1 ML | Refills: 0
Start: 2018-08-06 | End: 2018-08-06

## 2018-08-06 NOTE — PROGRESS NOTES
Nathaniel Wilkinson  1945   Chief Complaint   Patient presents with    Shoulder Pain     R SHOULDER PAIN, F/U APPT. HISTORY OF PRESENT ILLNESS  Nathaniel Wilkinson is a 75 y.o. female who presents today for reevaluation of R shoulder pain. On 12/22/2017, pt was dx'd with rotator cuff arthropathy. At last OV, patient had a cortisone injection which provided good relief. She is interested in another shot today. Patient rates pain as 5/10 today. Patient denies any fever, chills, chest pain, shortness of breath or calf pain. There are no new illness or injuries to report since last seen in the office. There are no changes to medications, allergies, family or social history. PHYSICAL EXAM:   Visit Vitals    /67    Pulse 77    Temp 98.1 °F (36.7 °C) (Oral)    Resp 16    Ht 4' 8\" (1.422 m)    Wt 167 lb 9.6 oz (76 kg)    SpO2 92%    BMI 37.58 kg/m2     The patient is a well-developed, well-nourished female   in no acute distress. The patient is alert and oriented times three. The patient is alert and oriented times three. Mood and affect are normal.  LYMPHATIC: lymph nodes are not enlarged and are within normal limits  SKIN: normal in color and non tender to palpation. There are no bruises or abrasions noted. NEUROLOGICAL: Motor sensory exam is within normal limits. Reflexes are equal bilaterally.  There is normal sensation to pinprick and light touch  MUSCULOSKELETAL:  Examination Right shoulder   Skin Intact   AC joint tenderness -   Biceps tenderness -   Forward flexion/Elevation    Active abduction    Glenohumeral abduction 80 with crepitus   External rotation ROM 50   Internal rotation ROM 30   Apprehension -   Carys Relocation -   Jerk -   Load and Shift -   Obriens -   Speeds -   Impingement sign -   Supraspinatus/Empty Can +4/5   External Rotation Strength +4/5   Lift Off/Belly Press -, 5/5   Neurovascular Intact          PROCEDURE: Right Shoulder Injection with Ultrasound Guidance      After sterile prep, 6 cc of Xylocaine and 1 cc of Kenalog were injected into the right shoulder. Ultrasound images captured using [facility] Ultrasound machine and scanned into patient's chart. [facility]  OFFICE PROCEDURE PROGRESS NOTE        Chart reviewed for the following:  Shannon Mendoza M.D, have reviewed the History, Physical and updated the Allergic reactions for [address] [place] [address] performed immediately prior to start of procedure:  Shannon Mendoza M.D, have performed the following reviews on [facility] prior to the start of the procedure:            * Patient was identified by name and date of birth   * Agreement on procedure being performed was verified  * Risks and Benefits explained to the patient  * Procedure site verified and marked as necessary  * Patient was positioned for comfort  * Consent was signed and verified     Time: 4:06 PM     Date of procedure: 8/6/2018    Procedure performed by:  Marcos Pena M.D    Provider assisted by: (see medication administration)    How tolerated by patient: tolerated the procedure well with no complications    Comments: none      IMAGING: XR of right shoulder dated 8/6/18 was reviewed and read: marked proximal migration of the humeral head with severe degenerative changes    IMPRESSION:      ICD-10-CM ICD-9-CM    1. Rotator cuff tear arthropathy of right shoulder M12.811 716.81 TRIAMCINOLONE ACETONIDE INJ      triamcinolone acetonide (KENALOG) 40 mg/mL injection      US GUIDE INJ/ASP/ARTHRO LG JNT/BURSA   2. Right shoulder pain, unspecified chronicity M25.511 719.41 AMB POC XRAY, SHOULDER; COMPLETE, 2+        PLAN:   1. Pt has had good relief with her last injection 5 months ago and I am optimistic that another injection today will provide good relief as well. Risk factors include: HTN  2.  Yes cortisone injection indicated today R SHOULDER   3. No Physical/Occupational Therapy indicated today  4. No diagnostic test indicated today  5. No durable medical equipment indicated today  6. No referral indicated today   7. No medications indicated today  8. No Narcotic indicated today    RTC prn  Follow-up Disposition: Not on File    Scribed by Marcela Hendrickson [facility]) as dictated by Des Murphy MD    I, Dr. Des Murphy, confirm that all documentation is accurate.     Des Murphy M.D.   [facility]

## 2019-02-19 ENCOUNTER — OFFICE VISIT (OUTPATIENT)
Dept: ORTHOPEDIC SURGERY | Age: 74
End: 2019-02-19

## 2019-02-19 VITALS
SYSTOLIC BLOOD PRESSURE: 148 MMHG | RESPIRATION RATE: 16 BRPM | TEMPERATURE: 97.4 F | OXYGEN SATURATION: 99 % | DIASTOLIC BLOOD PRESSURE: 92 MMHG | BODY MASS INDEX: 33.83 KG/M2 | WEIGHT: 150.4 LBS | HEIGHT: 56 IN | HEART RATE: 71 BPM

## 2019-02-19 DIAGNOSIS — M75.101 ROTATOR CUFF TEAR ARTHROPATHY OF RIGHT SHOULDER: Primary | ICD-10-CM

## 2019-02-19 DIAGNOSIS — M12.811 ROTATOR CUFF TEAR ARTHROPATHY OF RIGHT SHOULDER: Primary | ICD-10-CM

## 2019-02-19 RX ORDER — TRIAMCINOLONE ACETONIDE 40 MG/ML
40 INJECTION, SUSPENSION INTRA-ARTICULAR; INTRAMUSCULAR ONCE
Qty: 1 ML | Refills: 0
Start: 2019-02-19 | End: 2019-02-19

## 2019-02-19 NOTE — PROGRESS NOTES
Claudetta Gutter 1945 Chief Complaint Patient presents with  Shoulder Pain  
  right shoulder pain follow up HISTORY OF PRESENT ILLNESS Claudetta Gutter is a 75 y.o. female who presents today for reevaluation of R shoulder pain. Patient rates pain as 5/10 today. At last OV, patient had a cortisone injection which provided good relief, but the pain has returned. She is interested in another shot today. Patient denies any fever, chills, chest pain, shortness of breath or calf pain. The remainder of the review of systems is negative. There are no new illness or injuries to report since last seen in the office. There are no changes to medications, allergies, family or social history. Pain Assessment  2/19/2019 Location of Pain Shoulder Location Modifiers Right Severity of Pain 5 Quality of Pain Dull Quality of Pain Comment - Duration of Pain Persistent Duration of Pain Comment - Frequency of Pain Constant Aggravating Factors Straightening;Bending;Stretching Aggravating Factors Comment - Limiting Behavior Some Relieving Factors Rest  
Result of Injury -  
 
PHYSICAL EXAM:  
Visit Vitals BP (!) 148/92 Pulse 71 Temp 97.4 °F (36.3 °C) (Oral) Resp 16 Ht 4' 8\" (1.422 m) Wt 150 lb 6.4 oz (68.2 kg) SpO2 99% BMI 33.72 kg/m² The patient is a well-developed, well-nourished female   in no acute distress. The patient is alert and oriented times three. The patient is alert and oriented times three. Mood and affect are normal. 
LYMPHATIC: lymph nodes are not enlarged and are within normal limits SKIN: normal in color and non tender to palpation. There are no bruises or abrasions noted. NEUROLOGICAL: Motor sensory exam is within normal limits. Reflexes are equal bilaterally. There is normal sensation to pinprick and light touch MUSCULOSKELETAL: 
Examination Right shoulder Skin Intact AC joint tenderness - Biceps tenderness -  
 Forward flexion/Elevation  Active abduction  Glenohumeral abduction 80 with crepitus External rotation ROM 50 Internal rotation ROM 30 Apprehension -  
Carys Relocation -  
Jerk - Load and Shift -  
Jocelyn Rivera - Speeds - Impingement sign - Supraspinatus/Empty Can +4/5 External Rotation Strength +4/5 Lift Off/Belly Press -, 5/5 Neurovascular Intact PROCEDURE: Right Shoulder Injection with Ultrasound Guidance Indication:Right Shoulder pain/swelling After sterile prep, 6 cc of Xylocaine and 1 cc of Kenalog were injected into the right shoulder. Ultrasound images captured using [employer] Loop Ultrasound machine and scanned into patient's chart. [facility] [facility] 
OFFICE PROCEDURE PROGRESS NOTE Chart reviewed for the following: 
Kurt Serrano M.D, have reviewed the History, Physical and updated the Allergic reactions for Calleen Pane TIME OUT performed immediately prior to start of procedure: 
Kurt Serrano M.D, have performed the following reviews on Calleen Pane prior to the start of the procedure: 
         
* Patient was identified by name and date of birth * Agreement on procedure being performed was verified * Risks and Benefits explained to the patient * Procedure site verified and marked as necessary * Patient was positioned for comfort * Consent was signed and verified Time: 4:07 PM  
 
Date of procedure: 2/19/2019 Procedure performed by:  Srinivas Osullivan M.D Provider assisted by: (see medication administration) How tolerated by patient: tolerated the procedure well with no complications Comments: none IMAGING: XR of right shoulder dated 8/6/18 was reviewed and read: marked proximal migration of the humeral head with severe degenerative changes IMPRESSION:   
  ICD-10-CM ICD-9-CM 1. Rotator cuff tear arthropathy of right shoulder M75.101 716.81 TRIAMCINOLONE ACETONIDE INJ  
 M12.811  triamcinolone acetonide (KENALOG) 40 mg/mL injection US GUIDE INJ/ASP/ARTHRO LG JNT/BURSA PLAN:  
1. The patient presents today with right shoulder pain due to rotator cuff tear arthropathy and I am hopeful an injection today will help. Risk factors include: HTN 
2. Yes cortisone injection indicated today R SHOULDER US 
3. No Physical/Occupational Therapy indicated today 4. No diagnostic test indicated today 5. No durable medical equipment indicated today 6. No referral indicated today 7. No medications indicated today 8. No Narcotic indicated today RTC prn Follow-up Disposition: Not on File Scribed by Miki Gaitan ([address]) as dictated by Atul Laughlin MD 
 
I, Dr. Atul Laughlin, confirm that all documentation is accurate.  
 
Atul Laughlin M.D.  
[facility]

## 2019-07-30 ENCOUNTER — OFFICE VISIT (OUTPATIENT)
Dept: ORTHOPEDIC SURGERY | Age: 74
End: 2019-07-30

## 2019-07-30 VITALS
OXYGEN SATURATION: 97 % | HEART RATE: 69 BPM | RESPIRATION RATE: 10 BRPM | HEIGHT: 56 IN | TEMPERATURE: 98.3 F | DIASTOLIC BLOOD PRESSURE: 52 MMHG | BODY MASS INDEX: 31.27 KG/M2 | SYSTOLIC BLOOD PRESSURE: 119 MMHG | WEIGHT: 139 LBS

## 2019-07-30 DIAGNOSIS — M12.811 ROTATOR CUFF TEAR ARTHROPATHY OF RIGHT SHOULDER: Primary | ICD-10-CM

## 2019-07-30 DIAGNOSIS — M75.101 ROTATOR CUFF TEAR ARTHROPATHY OF RIGHT SHOULDER: Primary | ICD-10-CM

## 2019-07-30 RX ORDER — TRIAMCINOLONE ACETONIDE 40 MG/ML
40 INJECTION, SUSPENSION INTRA-ARTICULAR; INTRAMUSCULAR ONCE
Qty: 1 ML | Refills: 0
Start: 2019-07-30 | End: 2019-07-30

## 2019-07-30 NOTE — PROGRESS NOTES
Yovani Edwards  [birth year]   Chief Complaint   Patient presents with    Shoulder Pain     RIGHT SHOULDER PAIN        HISTORY OF PRESENT ILLNESS  Yovani Edwards is a 75 y.o. female who presents today for reevaluation of R shoulder pain. Patient rates pain as 0/10 today. At last OV on 2/19/19, patient had a cortisone injection which provided good relief, but the pain has returned. She is requesting another shot today. Pt notes she fell in the bathroom the other day. Patient denies any fever, chills, chest pain, shortness of breath or calf pain. The remainder of the review of systems is negative. There are no new illness or injuries to report since last seen in the office. There are no changes to medications, allergies, family or social history. Pain Assessment  7/30/2019   Location of Pain Shoulder   Location Modifiers Right   Severity of Pain 0   Quality of Pain -   Quality of Pain Comment -   Duration of Pain -   Duration of Pain Comment -   Frequency of Pain -   Aggravating Factors -   Aggravating Factors Comment -   Limiting Behavior -   Relieving Factors -   Result of Injury -     PHYSICAL EXAM:   Visit Vitals  /52   Pulse 69   Temp 98.3 °F (36.8 °C) (Oral)   Resp 10   Ht 4' 8\" (1.422 m)   Wt 139 lb (63 kg)   SpO2 97%   BMI 31.16 kg/m²     The patient is a well-developed, well-nourished female   in no acute distress. The patient is alert and oriented times three. The patient is alert and oriented times three. Mood and affect are normal.  LYMPHATIC: lymph nodes are not enlarged and are within normal limits  SKIN: normal in color and non tender to palpation. There are no bruises or abrasions noted. NEUROLOGICAL: Motor sensory exam is within normal limits. Reflexes are equal bilaterally.  There is normal sensation to pinprick and light touch  MUSCULOSKELETAL:  Examination Right shoulder   Skin Intact   AC joint tenderness -   Biceps tenderness -   Forward flexion/Elevation    Active abduction    Glenohumeral abduction 80 with crepitus   External rotation ROM 50   Internal rotation ROM 30   Apprehension -   Carys Relocation -   Jerk -   Load and Shift -   Obriens -   Speeds -   Impingement sign -   Supraspinatus/Empty Can +4/5   External Rotation Strength +4/5   Lift Off/Belly Press -, 5/5   Neurovascular Intact       PROCEDURE: Right Shoulder Injection with Ultrasound Guidance  Indication:Right Shoulder pain/swelling    After sterile prep, 6 cc of Xylocaine and 1 cc of Kenalog were injected into the right shoulder. Ultrasound images captured using [email or web address] Loop Ultrasound machine and scanned into patient's chart. [facility]  OFFICE PROCEDURE PROGRESS NOTE        Chart reviewed for the following:  Mis Mcgowan M.D, have reviewed the History, Physical and updated the Allergic reactions for [address] [place] [address] performed immediately prior to start of procedure:  Mis Mcgowan M.D, have performed the following reviews on Evelene Jorge Luis prior to the start of the procedure:            * Patient was identified by name and date of birth   * Agreement on procedure being performed was verified  * Risks and Benefits explained to the patient  * Procedure site verified and marked as necessary  * Patient was positioned for comfort  * Consent was signed and verified     Time: 3:00 PM    Date of procedure: 7/30/2019    Procedure performed by:  Silver Hernandez M.D    Provider assisted by: (see medication administration)    How tolerated by patient: tolerated the procedure well with no complications    Comments: none      IMAGING: XR of right shoulder dated 8/6/18 was reviewed and read: marked proximal migration of the humeral head with severe degenerative changes    IMPRESSION:      ICD-10-CM ICD-9-CM    1.  Rotator cuff tear arthropathy of right shoulder M75.101 716.81 TRIAMCINOLONE ACETONIDE INJ    M12.811 triamcinolone acetonide (KENALOG) 40 mg/mL injection      US GUIDE INJ/ASP/ARTHRO LG JNT/BURSA        PLAN:   1. The patient presents today with right shoulder pain due to rotator cuff tear arthropathy and I am hopeful an injection today will help. Risk factors include: htn  2. Yes cortisone injection indicated today R SHOULDER US  3. No Physical/Occupational Therapy indicated today  4. No diagnostic test indicated today  5. No durable medical equipment indicated today  6. No referral indicated today   7. No medications indicated today  8. No Narcotic indicated today    RTC prn      Scribed by [address]) as dictated by Karly Vidales MD    I, Dr. Karly Vidales, confirm that all documentation is accurate.     Karly Vidales M.D.   [facility]

## 2019-07-30 NOTE — PROGRESS NOTES
1. Have you been to the ER, urgent care clinic since your last visit? Hospitalized since your last visit? No    2. Have you seen or consulted any other health care providers outside of the [address] [place] [address] since your last visit? Include any pap smears or colon screening.  No

## 2019-11-04 ENCOUNTER — OFFICE VISIT (OUTPATIENT)
Dept: ORTHOPEDIC SURGERY | Age: 74
End: 2019-11-04

## 2019-11-04 VITALS
HEART RATE: 72 BPM | WEIGHT: 138 LBS | RESPIRATION RATE: 12 BRPM | SYSTOLIC BLOOD PRESSURE: 125 MMHG | OXYGEN SATURATION: 96 % | BODY MASS INDEX: 31.05 KG/M2 | HEIGHT: 56 IN | DIASTOLIC BLOOD PRESSURE: 70 MMHG | TEMPERATURE: 98.3 F

## 2019-11-04 DIAGNOSIS — M75.101 ROTATOR CUFF TEAR ARTHROPATHY OF RIGHT SHOULDER: Primary | ICD-10-CM

## 2019-11-04 DIAGNOSIS — M12.811 ROTATOR CUFF TEAR ARTHROPATHY OF RIGHT SHOULDER: Primary | ICD-10-CM

## 2019-11-04 RX ORDER — CYCLOBENZAPRINE HCL 10 MG
10 TABLET ORAL
Qty: 30 TAB | Refills: 2 | Status: SHIPPED | OUTPATIENT
Start: 2019-11-04 | End: 2019-12-27

## 2019-11-04 RX ORDER — TRIAMCINOLONE ACETONIDE 40 MG/ML
40 INJECTION, SUSPENSION INTRA-ARTICULAR; INTRAMUSCULAR ONCE
Qty: 1 ML | Refills: 0
Start: 2019-11-04 | End: 2019-11-04

## 2019-11-04 NOTE — PATIENT INSTRUCTIONS
Dr. Lawanda Bradshaw Total and Reverse Total Shoulder Information What is the surgery? - This is an inpatient procedure done at [facility] 48 will be completely asleep for procedure. Dr. Lawanda Bradshaw will make an incision in the front of your shoulder and replace the joint itself - Total surgery takes about an hour and half - You will then spend the night to receive prophylactic antibiotics and pain medication if needed. The goal is to send you home the following day and have you begin outpatient physical therapy about 3 days after you are discharged to home What can you expect after surgery? - You will have a waterproof dressing on your shoulder following surgery. You will be able to shower 2 days after surgery but no soaking in a bath, hot tub, ocean or pool x 2 weeks to allow for full wound healing - You will be in a sling for 4 weeks. You will wear this sling whenever you are active or up moving around. This sling is to keep you from moving your arm on your own. You are essentially one armed until you are out of your sling. This means no reaching, pulling, grabbing or lifting with the operative arm. It is ok for you to remove your sling when you are sitting in a chair or you are in bed 
- Dr. Lawanda Bradshaw will start physical therapy for you a few days after you are discharged from the hospital. While you cannot move your arm we allow physical therapy to gently move your shoulder. We call this passive range of motion. The goal of this is to decrease your stiffness and in turn decrease your post operative pain. - Plan on being in physical therapy for 10-12 weeks When can I return to work? - Most patients return to desk work only after 2 weeks. You are able to type but there is no overhead work or lifting - You will start some gentle lifting up to 5-10lbs at about 8-10 weeks post operatively.  You will gradually increase how much you are able to lift after this point under the guidance of Dr. Tita Sheth, his physician assistant and physical therapy. - At 6 months you are able to do all activities as tolerated but it may take you a full 9-12 months to fully recover from your surgery Not all shoulder replacements are the same. The specifics of your individual case will be discussed at length with you by Dr. Tita Sheth and his Physician Assistant. Lee Edmond Surgical Coordinator [address] [place] [address]. [facility]. [address] [place] [address], [facility] [email or web address] 
P: [phone] F: [phone]

## 2019-11-04 NOTE — PROGRESS NOTES
Vivi Meyer  [birth year]   Chief Complaint   Patient presents with    Shoulder Pain     RIGHT SHOULDER PAIN        HISTORY OF PRESENT ILLNESS  Vivi Meyer is a 75 y.o. female who presents today for reevaluation of R shoulder pain. Patient rates pain as 1/10 today. At last OV on 7/30/19, patient had a cortisone injection which provided minimal relief. The injection only provided relief for about 2 weeks. She has had a previous injection with relief. Pt reports her pain does not limit her activities but she \"pays for it\" afterwards. The pain has worsened within the last couple of weeks. She notes she has trouble getting to sleep at night due to the pain. She has been taking Tramadol, Tylenol, and \"arthritis pills\". Patient denies any fever, chills, chest pain, shortness of breath or calf pain. The remainder of the review of systems is negative. There are no new illness or injuries to report since last seen in the office. There are no changes to medications, allergies, family or social history. Pain Assessment  11/4/2019   Location of Pain Shoulder   Location Modifiers Right   Severity of Pain 1   Quality of Pain Aching; Sharp   Quality of Pain Comment -   Duration of Pain Persistent   Duration of Pain Comment -   Frequency of Pain Constant   Aggravating Factors Stretching   Aggravating Factors Comment -   Limiting Behavior -   Relieving Factors Rest   Result of Injury No     PHYSICAL EXAM:   Visit Vitals  /70   Pulse 72   Temp 98.3 °F (36.8 °C) (Oral)   Resp 12   Ht 4' 8\" (1.422 m)   Wt 138 lb (62.6 kg)   SpO2 96%   BMI 30.94 kg/m²     The patient is a well-developed, well-nourished female   in no acute distress. The patient is alert and oriented times three. The patient is alert and oriented times three. Mood and affect are normal.  LYMPHATIC: lymph nodes are not enlarged and are within normal limits  SKIN: normal in color and non tender to palpation.  There are no bruises or abrasions noted.   NEUROLOGICAL: Motor sensory exam is within normal limits. Reflexes are equal bilaterally. There is normal sensation to pinprick and light touch  MUSCULOSKELETAL:  Examination Right shoulder   Skin Intact   AC joint tenderness -   Biceps tenderness -   Forward flexion/Elevation    Active abduction    Glenohumeral abduction 80 with crepitus   External rotation ROM 50   Internal rotation ROM 30   Apprehension -   Carys Relocation -   Jerk -   Load and Shift -   Obriens -   Speeds -   Impingement sign -   Supraspinatus/Empty Can +4/5   External Rotation Strength +4/5   Lift Off/Belly Press -, 5/5   Neurovascular Intact      PROCEDURE: Right Shoulder Injection with Ultrasound Guidance  Indication:Right Shoulder pain/swelling    After sterile prep, 6 cc of Xylocaine and 1 cc of Kenalog were injected into the right shoulder. Ultrasound images captured using [employer] Ultrasound machine and scanned into patient's chart.        [facility]  OFFICE PROCEDURE PROGRESS NOTE        Chart reviewed for the following:  Gean Kocher, M.D, have reviewed the History, Physical and updated the Allergic reactions for [address] [place] [address] performed immediately prior to start of procedure:  Gean Kocher, M.D, have performed the following reviews on [facility] prior to the start of the procedure:            * Patient was identified by name and date of birth   * Agreement on procedure being performed was verified  * Risks and Benefits explained to the patient  * Procedure site verified and marked as necessary  * Patient was positioned for comfort  * Consent was signed and verified     Time: 3:05 PM     Date of procedure: 11/4/2019    Procedure performed by:  Jarek Khanna M.D    Provider assisted by: (see medication administration)    How tolerated by patient: tolerated the procedure well with no complications    Comments: none      IMAGING: XR of right shoulder dated 8/6/18 was reviewed and read: marked proximal migration of the humeral head with severe degenerative changes    IMPRESSION:      ICD-10-CM ICD-9-CM    1. Rotator cuff tear arthropathy of right shoulder M75.101 716.81 CT SHOULDER RT WO CONT    M12.811  TRIAMCINOLONE ACETONIDE INJ      triamcinolone acetonide (KENALOG) 40 mg/mL injection      US GUIDE INJ/ASP/ARTHRO LG JNT/BURSA        PLAN:   1. I discussed the risks and benefits and potential adverse outcomes of both operative vs non operative treatment of right rotator cuff arthropathy with the patient. Patient wishes to proceed with right reverse total shoulder arthroplasty. Risks of operative intervention include but not limited to bleeding, infection, deep vein thrombosis, pulmonary embolism, death, limb length discrepancy, reflexive sympathetic dystrophy, fat embolism syndrome,damage to blood vessels and nerves, malunion, non-union, delayed union, failure of hardware, post traumatic arthritis, stroke, heart attack, and death. Patient understands that infection may arise and may require numerous surgeries. History and physical exam scheduled for a later date. Risk factors include: htn  2. Yes cortisone injection indicated today R SHOULDER US  3. No Physical/Occupational Therapy indicated today  4. Yes diagnostic test indicated today CT R SHOULDER  5. No durable medical equipment indicated today  6. No referral indicated today   7. Yes medications indicated today FLEXERIL  8. No Narcotic indicated today    RTC H&P      Scribed by Marco Marshall) as dictated by Isaiah Guerra MD    I, Dr. Isaiah Guerra, confirm that all documentation is accurate.     Isaiah Guerra M.D.   [facility]

## 2019-11-04 NOTE — PROGRESS NOTES
1. Have you been to the ER, urgent care clinic since your last visit? Hospitalized since your last visit? No    2. Have you seen or consulted any other health care providers outside of the [address] [place] [address] since your last visit? Include any pap smears or colon screening.  No

## 2019-11-11 DIAGNOSIS — Z01.818 PREOP EXAMINATION: Primary | ICD-10-CM

## 2019-11-18 ENCOUNTER — HOSPITAL ENCOUNTER (OUTPATIENT)
Dept: CT IMAGING | Age: 74
Discharge: HOME | End: 2019-11-18
Attending: ORTHOPAEDIC SURGERY
Payer: MEDICARE

## 2019-11-18 DIAGNOSIS — M75.101 ROTATOR CUFF TEAR ARTHROPATHY OF RIGHT SHOULDER: ICD-10-CM

## 2019-11-18 DIAGNOSIS — M12.811 ROTATOR CUFF TEAR ARTHROPATHY OF RIGHT SHOULDER: ICD-10-CM

## 2019-11-18 PROCEDURE — 73200 CT UPPER EXTREMITY W/O DYE: CPT

## 2019-12-27 ENCOUNTER — HOSPITAL ENCOUNTER (OUTPATIENT)
Dept: GENERAL RADIOLOGY | Age: 74
Discharge: HOME | End: 2019-12-27
Payer: MEDICARE

## 2019-12-27 ENCOUNTER — HOSPITAL ENCOUNTER (OUTPATIENT)
Dept: PREADMISSION TESTING | Age: 74
Discharge: HOME | End: 2019-12-27
Payer: MEDICARE

## 2019-12-27 ENCOUNTER — TELEPHONE (OUTPATIENT)
Dept: ORTHOPEDIC SURGERY | Age: 74
End: 2019-12-27

## 2019-12-27 DIAGNOSIS — Z01.818 PREOP EXAMINATION: ICD-10-CM

## 2019-12-27 LAB
ABO + RH BLD: NORMAL
ALBUMIN SERPL-MCNC: 3.3 G/DL (ref 3.4–5)
ALBUMIN/GLOB SERPL: 1.1 {RATIO} (ref 0.8–1.7)
ALP SERPL-CCNC: 117 U/L (ref 45–117)
ALT SERPL-CCNC: 32 U/L (ref 13–56)
ANION GAP SERPL CALC-SCNC: 7 MMOL/L (ref 3–18)
APPEARANCE UR: ABNORMAL
APTT PPP: 30.2 SEC (ref 23–36.4)
AST SERPL-CCNC: 22 U/L (ref 10–38)
ATRIAL RATE: 90 BPM
BACTERIA URNS QL MICRO: ABNORMAL /HPF
BASOPHILS # BLD: 0 K/UL (ref 0–0.1)
BASOPHILS NFR BLD: 0 % (ref 0–2)
BILIRUB SERPL-MCNC: 0.6 MG/DL (ref 0.2–1)
BILIRUB UR QL: NEGATIVE
BLOOD GROUP ANTIBODIES SERPL: NORMAL
BUN SERPL-MCNC: 32 MG/DL (ref 7–18)
BUN/CREAT SERPL: 28 (ref 12–20)
CALCIUM SERPL-MCNC: 9.2 MG/DL (ref 8.5–10.1)
CALCULATED P AXIS, ECG09: 81 DEGREES
CALCULATED R AXIS, ECG10: 23 DEGREES
CALCULATED T AXIS, ECG11: 50 DEGREES
CHLORIDE SERPL-SCNC: 108 MMOL/L (ref 100–111)
CO2 SERPL-SCNC: 28 MMOL/L (ref 21–32)
COLOR UR: YELLOW
CREAT SERPL-MCNC: 1.16 MG/DL (ref 0.6–1.3)
DIAGNOSIS, 93000: NORMAL
DIFFERENTIAL METHOD BLD: ABNORMAL
EOSINOPHIL # BLD: 0.2 K/UL (ref 0–0.4)
EOSINOPHIL NFR BLD: 3 % (ref 0–5)
EPITH CASTS URNS QL MICRO: ABNORMAL /LPF (ref 0–5)
ERYTHROCYTE [DISTWIDTH] IN BLOOD BY AUTOMATED COUNT: 13.5 % (ref 11.6–14.5)
GLOBULIN SER CALC-MCNC: 3 G/DL (ref 2–4)
GLUCOSE SERPL-MCNC: 95 MG/DL (ref 74–99)
GLUCOSE UR STRIP.AUTO-MCNC: NEGATIVE MG/DL
HCT VFR BLD AUTO: 36.9 % (ref 35–45)
HGB BLD-MCNC: 12.2 G/DL (ref 12–16)
HGB UR QL STRIP: NEGATIVE
INR PPP: 0.9 (ref 0.8–1.2)
KETONES UR QL STRIP.AUTO: NEGATIVE MG/DL
LEUKOCYTE ESTERASE UR QL STRIP.AUTO: ABNORMAL
LYMPHOCYTES # BLD: 1.2 K/UL (ref 0.9–3.6)
LYMPHOCYTES NFR BLD: 16 % (ref 21–52)
MCH RBC QN AUTO: 31 PG (ref 24–34)
MCHC RBC AUTO-ENTMCNC: 33.1 G/DL (ref 31–37)
MCV RBC AUTO: 93.9 FL (ref 74–97)
MONOCYTES # BLD: 0.9 K/UL (ref 0.05–1.2)
MONOCYTES NFR BLD: 13 % (ref 3–10)
MUCOUS THREADS URNS QL MICRO: ABNORMAL /LPF
NEUTS SEG # BLD: 5 K/UL (ref 1.8–8)
NEUTS SEG NFR BLD: 68 % (ref 40–73)
NITRITE UR QL STRIP.AUTO: NEGATIVE
P-R INTERVAL, ECG05: 154 MS
PH UR STRIP: 6 [PH] (ref 5–8)
PLATELET # BLD AUTO: 444 K/UL (ref 135–420)
PMV BLD AUTO: 9.9 FL (ref 9.2–11.8)
POTASSIUM SERPL-SCNC: 4.1 MMOL/L (ref 3.5–5.5)
PROT SERPL-MCNC: 6.3 G/DL (ref 6.4–8.2)
PROT UR STRIP-MCNC: NEGATIVE MG/DL
PROTHROMBIN TIME: 12.4 SEC (ref 11.5–15.2)
Q-T INTERVAL, ECG07: 368 MS
QRS DURATION, ECG06: 72 MS
QTC CALCULATION (BEZET), ECG08: 450 MS
RBC # BLD AUTO: 3.93 M/UL (ref 4.2–5.3)
RBC #/AREA URNS HPF: ABNORMAL /HPF (ref 0–5)
SODIUM SERPL-SCNC: 143 MMOL/L (ref 136–145)
SP GR UR REFRACTOMETRY: 1.02 (ref 1–1.03)
SPECIMEN EXP DATE BLD: NORMAL
UROBILINOGEN UR QL STRIP.AUTO: 0.2 EU/DL (ref 0.2–1)
VENTRICULAR RATE, ECG03: 90 BPM
WBC # BLD AUTO: 7.3 K/UL (ref 4.6–13.2)
WBC URNS QL MICRO: ABNORMAL /HPF (ref 0–4)

## 2019-12-27 PROCEDURE — 86900 BLOOD TYPING SEROLOGIC ABO: CPT

## 2019-12-27 PROCEDURE — 36415 COLL VENOUS BLD VENIPUNCTURE: CPT

## 2019-12-27 PROCEDURE — 93005 ELECTROCARDIOGRAM TRACING: CPT

## 2019-12-27 PROCEDURE — 85025 COMPLETE CBC W/AUTO DIFF WBC: CPT

## 2019-12-27 PROCEDURE — 85730 THROMBOPLASTIN TIME PARTIAL: CPT

## 2019-12-27 PROCEDURE — 80053 COMPREHEN METABOLIC PANEL: CPT

## 2019-12-27 PROCEDURE — 71046 X-RAY EXAM CHEST 2 VIEWS: CPT

## 2019-12-27 PROCEDURE — 85610 PROTHROMBIN TIME: CPT

## 2019-12-27 PROCEDURE — 81001 URINALYSIS AUTO W/SCOPE: CPT

## 2019-12-27 RX ORDER — SULFAMETHOXAZOLE AND TRIMETHOPRIM 800; 160 MG/1; MG/1
1 TABLET ORAL 2 TIMES DAILY
Qty: 14 TAB | Refills: 0 | Status: SHIPPED | OUTPATIENT
Start: 2019-12-27

## 2019-12-27 NOTE — TELEPHONE ENCOUNTER
Please inform patient she has a UTI from her lab work. Sent in an antibiotic we would like her to start taking ASAP.

## 2020-01-03 ENCOUNTER — OFFICE VISIT (OUTPATIENT)
Dept: ORTHOPEDIC SURGERY | Age: 75
End: 2020-01-03

## 2020-01-03 VITALS
HEIGHT: 56 IN | TEMPERATURE: 99.1 F | HEART RATE: 80 BPM | SYSTOLIC BLOOD PRESSURE: 147 MMHG | WEIGHT: 138 LBS | DIASTOLIC BLOOD PRESSURE: 76 MMHG | BODY MASS INDEX: 31.05 KG/M2 | RESPIRATION RATE: 16 BRPM | OXYGEN SATURATION: 99 %

## 2020-01-03 DIAGNOSIS — M12.811 ROTATOR CUFF TEAR ARTHROPATHY OF RIGHT SHOULDER: Primary | ICD-10-CM

## 2020-01-03 DIAGNOSIS — M75.101 ROTATOR CUFF TEAR ARTHROPATHY OF RIGHT SHOULDER: Primary | ICD-10-CM

## 2020-01-03 RX ORDER — GABAPENTIN 100 MG/1
400 CAPSULE ORAL ONCE
Status: CANCELLED | OUTPATIENT
Start: 2020-01-03 | End: 2020-01-03

## 2020-01-03 RX ORDER — OXYCODONE HYDROCHLORIDE 5 MG/1
5 TABLET ORAL
Qty: 40 TAB | Refills: 0 | Status: SHIPPED | OUTPATIENT
Start: 2020-01-03 | End: 2020-01-10

## 2020-01-03 RX ORDER — CELECOXIB 100 MG/1
400 CAPSULE ORAL ONCE
Status: CANCELLED | OUTPATIENT
Start: 2020-01-03 | End: 2020-01-03

## 2020-01-03 RX ORDER — ACETAMINOPHEN 325 MG/1
1000 TABLET ORAL ONCE
Status: CANCELLED | OUTPATIENT
Start: 2020-01-03 | End: 2020-01-03

## 2020-01-03 NOTE — H&P
HISTORY AND PHYSICAL          Patient: Yu Patel                MRN: [id number]       SSN: [id number]  YOB: 1945          AGE: 75 y.o. SEX: female      Patient scheduled for:  Right reverse total shoulder arthroplasty    Surgeon: Cj Soria MD    ANESTHESIA TYPE:  General    HISTORY:     The patient was seen in the office today for a preoperative history and physical for an upcoming above listed surgery. The patient is a pleasant 75 y.o. female who has a history of right shoulder pain. On 7/30/19, patient had a cortisone injection which provided minimal relief. The injection only provided relief for about 2 weeks. She has had a previous injection with relief. Pt reports her pain does not limit her activities but she \"pays for it\" afterwards. The pain has worsened within the last couple of weeks. She notes she has trouble getting to sleep at night due to the pain. She has been taking Tramadol, Tylenol, and \"arthritis pills\". Pain level is a 0/10. Due to the current findings, affected activity of daily living and continued pain and discomfort, surgical intervention is indicated. The alternatives, risks, and complications, including but not limited to infection, blood loss, need for blood transfusion, neurovascular damage, fransisco-incisional numbness, subcutaneous hematoma, bone fracture, anesthetic complications, DVT, PE, death, RSD, postoperative stiffness and pain, possible surgical scar, delayed healing and nonhealing, reflexive sympathetic dystrophy, damage to blood vessels and nerves, need for more surgery, MI, and stroke,  failure of hardware, gait disturbances,have been discussed. The patient understands and wishes to proceed with surgery.      PAST MEDICAL HISTORY:     Past Medical History:   Diagnosis Date    Arthritis, left knee     Asthma     Back pain     Fuchs' corneal dystrophy     bilateral    GERD (gastroesophageal reflux disease)     Hypertension  Left shoulder pain     Osteoporosis     S/p knee replacement right        CURRENT MEDICATIONS:     Current Outpatient Medications   Medication Sig Dispense Refill    losartan (COZAAR) 50 mg tablet Take  by mouth daily. Indications: high blood pressure      cholecalciferol, vitamin d3, (VITAMIN D3) 400 unit cap Take  by mouth daily.  trimethoprim-sulfamethoxazole (BACTRIM DS, SEPTRA DS) 160-800 mg per tablet Take 1 Tab by mouth two (2) times a day. 14 Tab 0    hydroCHLOROthiazide (HYDRODIURIL) 25 mg tablet Take 25 mg by mouth daily.  potassium chloride (K-DUR, KLOR-CON) 20 mEq tablet 20 mEq daily.  traMADol (ULTRAM) 50 mg tablet Take 50 mg by mouth two (2) times a day.  albuterol (PROVENTIL HFA, VENTOLIN HFA, PROAIR HFA) 90 mcg/actuation inhaler 2 Puffs.  diclofenac-miSOPROStol (ARTHROTEC 75)  mg-mcg per tablet Take 1 Tab by Mouth Twice Daily.  zolpidem (AMBIEN) 5 mg tablet 5 mg.  brinzolamide-brimonidine (SIMBRINZA) 1-0.2 % drps Instill 1 Drop in eye 3 Times Daily.  FLUoxetine (PROZAC) 20 mg capsule 20 mg daily.  fluticasone-salmeterol (ADVAIR DISKUS) 250-50 mcg/dose diskus inhaler 1 Puff.  albuterol (PROVENTIL HFA) 90 mcg/actuation inhaler Take 2 Puffs by inhalation every six (6) hours as needed for Wheezing.  omeprazole (PRILOSEC) 20 mg capsule Take 20 mg by mouth daily.  multivitamin (ONE A DAY) tablet Take 1 Tab by mouth daily.  Calcium-Cholecalciferol, D3, (CALCIUM 600 WITH VITAMIN D3) 600 mg(1,500mg) -400 unit cap Take  by mouth two (2) times a day.  BRINZOLAMIDE/BRIMONIDINE TART (SIMBRINZA OP) Apply 1 Drop to eye three (3) times daily.  fluorometholone (FML FORTE) 0.25 % ophthalmic suspension Administer 1 Drop to both eyes two (2) times a day.  HYDROcodone-acetaminophen (NORCO) 5-325 mg per tablet Take 1 Tab by mouth every six (6) hours as needed for Pain. Max Daily Amount: 4 Tabs.  40 Tab 0       ALLERGIES:     No Known Allergies      SURGICAL HISTORY:     Past Surgical History:   Procedure Laterality Date    HX CHOLECYSTECTOMY      HX COLONOSCOPY      HX GYN      hysterectomy    HX HEENT      corneal transplants    HX HYSTERECTOMY      HX KNEE REPLACEMENT Right     HX ORTHOPAEDIC Bilateral     knee replacement     HX SHOULDER REPLACEMENT Left 2017    HX TONSILLECTOMY         SOCIAL HISTORY:     Social History     Socioeconomic History    Marital status:      Spouse name: Not on file    Number of children: Not on file    Years of education: Not on file    Highest education level: Not on file   Tobacco Use    Smoking status: Never Smoker    Smokeless tobacco: Never Used   Substance and Sexual Activity    Alcohol use: No    Drug use: No       FAMILY HISTORY:     Family History   Problem Relation Age of Onset    Arthritis-osteo Other     Diabetes Father     Heart Disease Father     Cancer Mother        REVIEW OF SYSTEMS:     Negative for fevers, chills, chest pain, shortness of breath, weight loss, recent illness     General: Negative for fever and chills. No unexpected change in weight. Denies fatigue. No change in appetite. Skin: Negative for rash or itching. HEENT: Negative for congestion, sore throat, neck pain and neck stiffness. No change in vision or hearing. Hasn't noted any enlarged lymph nodes in the neck. Cardiovascular:  Negative for chest pain and palpitations. Has not noted pedal edema. Respiratory: Negative for cough, colds, sinus, hemoptysis, shortness of breath and wheezing. Gastrointestinal: Negative for nausea and vomiting, rectal bleeding, coffee ground emesis, abdominal pain, diarrhea and constipation. Genitourinary: Negative for dysuria, frequency urgency, or burning on micturition. No flank pain, no foul smelling urine, no difficulty with initiating urination. Hematological: Negative for bleeding or easy bruising.   Musculoskeletal: Negative  for arthralgias, back pain or neck pain. Neurological: Negative for dizziness, seizures or syncopal episodes. Denies headaches. Endocrine: Denies excessive thirst.  No heat/cold intolerance. Psychiatric: Negative for depression or insomnia. PHYSICAL EXAMINATION:     VITALS: There were no vitals taken for this visit. GEN:  Well developed, well nourished 75 y.o. female in no acute distress. HEENT: Normocephalic and atraumatic. Eyes: Conjunctivae and EOM are normal.Pupils are equal, round, and reactive to light. External ear normal appearance, external nose normal appearing. Mouth/Throat: Oropharynx is clear and moist, able to handle oral secretions w/out difficulty, airway patent  NECK: Supple. Normal ROM, No lymphadenopathy. Trachea is midline. No bruising, swelling or deformity  RESP: Clear to auscultation bilaterally. No wheezes, rales, rhonchi. Normal effort and breath sounds. No respiratory distress  CARDIO:  Normal rate, regular rhythm and normal heart sounds. No MGR. ABDOMEN: Soft, non-tender, non-distended, normoactive bowel sounds in all four quadrants. There is no tenderness. There is no rebound and no guarding. BACK: No CVA or spinal tenderness  BREAST:  Deferred  PELVIC:    Deferred   RECTAL:  Deferred   :           Deferred  EXTREMITIES: EXAMINATION OF: right shoulder  Examination Right shoulder   Skin Intact   AC joint tenderness -   Biceps tenderness -   Forward flexion/Elevation    Active abduction    Glenohumeral abduction 80 with crepitus   External rotation ROM 50   Internal rotation ROM 30   Apprehension -   Carys Relocation -   Jerk -   Load and Shift -   Obriens -   Speeds -   Impingement sign -   Supraspinatus/Empty Can +4/5   External Rotation Strength +4/5   Lift Off/Belly Press -, 5/5   Neurovascular Intact       NEUROVASCULAR: Sensation intact to light touch and strength grossly intact and symmetrical. No nystagmus. Positive distal pulses and capillary refill.    DVT ASSESSMENT: There is not  calf tenderness. No evidence of DVT seen on physical exam.  MOTOR: In tact  PSYCH: Alert an oriented to person, place and time. Mood, memory, affect, behavior and judgment normal       RADIOGRAPHS & DIAGNOSTIC STUDIES:     MRI/xray reveals : XR of right shoulder dated 8/6/18 was reviewed and read: marked proximal migration of the humeral head with severe degenerative changes    1. Erosive and accommodative changes to the glenohumeral joint and  calcifications in the joint space. This may reflect an inflammatory arthropathy  and possible CPPD.      2.  Likely rotator cuff tears of the supraspinatus, infraspinatus and  subscapularis with severe muscular atrophy.      3. Accommodative changes to the undersurface of the acromion likely related to  rotator cuff pathology.      4.  Os acromiale     5. Moderate AC erosive arthropathy with calcifications in the joint likely  reflecting CPPD.           LABS:       @  CBC:   Lab Results   Component Value Date/Time    WBC 7.3 12/27/2019 11:09 AM    RBC 3.93 (L) 12/27/2019 11:09 AM    HGB 12.2 12/27/2019 11:09 AM    HCT 36.9 12/27/2019 11:09 AM    PLATELET 693 (H) 15/26/5720 11:09 AM   , BMP:   Lab Results   Component Value Date/Time    Glucose 95 12/27/2019 11:09 AM    Sodium 143 12/27/2019 11:09 AM    Potassium 4.1 12/27/2019 11:09 AM    Chloride 108 12/27/2019 11:09 AM    CO2 28 12/27/2019 11:09 AM    BUN 32 (H) 12/27/2019 11:09 AM    Creatinine 1.16 12/27/2019 11:09 AM    Calcium 9.2 12/27/2019 11:09 AM    and Coagulation:   Lab Results   Component Value Date/Time    Prothrombin time 12.4 12/27/2019 11:09 AM    INR 0.9 12/27/2019 11:09 AM    aPTT 30.2 12/27/2019 11:09 AM   @    Preoperative labs were reviewed and are substantially within normal limits   EKG and stress test pending      ASSESSMENT:       No diagnosis found. PLAN:     Again, the alternatives, risks, and complications, as well as expected outcome were discussed.  The patient understands and agrees to proceed with right reverse total shoulder arthroplasty pending clearance. Patient given orders listed below:    No orders of the defined types were placed in this encounter.         Ofe Harris PA-C  1/3/2020  12:49 PM

## 2020-01-03 NOTE — PROGRESS NOTES
1. Have you been to the ER, urgent care clinic since your last visit? Hospitalized since your last visit? No    2. Have you seen or consulted any other health care providers outside of the [address] [place] [address] since your last visit? Include any pap smears or colon screening.  No

## 2020-02-05 ENCOUNTER — OFFICE VISIT (OUTPATIENT)
Dept: ORTHOPEDIC SURGERY | Age: 75
End: 2020-02-05

## 2020-02-05 VITALS
SYSTOLIC BLOOD PRESSURE: 136 MMHG | HEART RATE: 62 BPM | HEIGHT: 56 IN | WEIGHT: 139.6 LBS | BODY MASS INDEX: 31.4 KG/M2 | TEMPERATURE: 98.6 F | DIASTOLIC BLOOD PRESSURE: 52 MMHG | OXYGEN SATURATION: 96 % | RESPIRATION RATE: 16 BRPM

## 2020-02-05 DIAGNOSIS — M12.811 ROTATOR CUFF TEAR ARTHROPATHY OF RIGHT SHOULDER: Primary | ICD-10-CM

## 2020-02-05 DIAGNOSIS — M75.101 ROTATOR CUFF TEAR ARTHROPATHY OF RIGHT SHOULDER: Primary | ICD-10-CM

## 2020-02-05 RX ORDER — TRIAMCINOLONE ACETONIDE 40 MG/ML
40 INJECTION, SUSPENSION INTRA-ARTICULAR; INTRAMUSCULAR ONCE
Qty: 1 ML | Refills: 0
Start: 2020-02-05 | End: 2020-02-05

## 2020-02-05 NOTE — PROGRESS NOTES
Gail Nguyen  [birth year]   Chief Complaint   Patient presents with    Shoulder Pain     right shoulder pain        HISTORY OF PRESENT ILLNESS  Gail Nguyen is a 75 y.o. female who presents today for reevaluation of right shoulder pain. Patient rates pain as 5/10 today. The patient has had a cortisone injection, most recently on 11/04/19, which provided relief. She is requesting another injection today. She had surgery scheduled for the shoulder but did not have the surgery due to an abnormal EKG. She is currently seeing a cardiologist for this and has an upcoming catheterization procedure scheduled. She has tried taking Tramadol, Tylenol, and \"arthritis pills\". Patient denies any fever, chills, chest pain, shortness of breath or calf pain. The remainder of the review of systems is negative. There are no new illness or injuries to report since last seen in the office. There are no changes to medications, allergies, family or social history. Pain Assessment  2/5/2020   Location of Pain Shoulder   Location Modifiers Right   Severity of Pain 5   Quality of Pain Aching;Dull   Quality of Pain Comment -   Duration of Pain Persistent   Duration of Pain Comment -   Frequency of Pain Constant   Aggravating Factors (No Data)   Aggravating Factors Comment range of motion   Limiting Behavior -   Relieving Factors Rest   Result of Injury No     PHYSICAL EXAM:   Visit Vitals  /52 (BP 1 Location: Left arm, BP Patient Position: Sitting)   Pulse 62   Temp 98.6 °F (37 °C) (Oral)   Resp 16   Ht 4' 8\" (1.422 m)   Wt 139 lb 9.6 oz (63.3 kg)   SpO2 96%   BMI 31.30 kg/m²     The patient is a well-developed, well-nourished female   in no acute distress. The patient is alert and oriented times three. The patient is alert and oriented times three. Mood and affect are normal.  LYMPHATIC: lymph nodes are not enlarged and are within normal limits  SKIN: normal in color and non tender to palpation.  There are no bruises or abrasions noted. NEUROLOGICAL: Motor sensory exam is within normal limits. Reflexes are equal bilaterally. There is normal sensation to pinprick and light touch  MUSCULOSKELETAL:  Examination Right shoulder   Skin Intact   AC joint tenderness -   Biceps tenderness -   Forward flexion/Elevation    Active abduction    Glenohumeral abduction 80 with crepitus   External rotation ROM 50   Internal rotation ROM 30   Apprehension -   Carys Relocation -   Jerk -   Load and Shift -   Obriens -   Speeds -   Impingement sign -   Supraspinatus/Empty Can +4/5   External Rotation Strength +4/5   Lift Off/Belly Press -, 5/5   Neurovascular Intact      PROCEDURE: Right Shoulder Injection with Ultrasound Guidance  Indication:Right Shoulder pain/swelling    After sterile prep, 6 cc of Xylocaine and 1 cc of Kenalog were injected into the right shoulder. Ultrasound images captured using [employer] Ultrasound machine and scanned into patient's chart.        [facility] - [facility]  OFFICE PROCEDURE PROGRESS NOTE        Chart reviewed for the following:  Aleksey White M.D, have reviewed the History, Physical and updated the Allergic reactions for [address] [place] [address] performed immediately prior to start of procedure:  Aleksey White M.D, have performed the following reviews on [facility] prior to the start of the procedure:            * Patient was identified by name and date of birth   * Agreement on procedure being performed was verified  * Risks and Benefits explained to the patient  * Procedure site verified and marked as necessary  * Patient was positioned for comfort  * Consent was signed and verified     Time: 1:30 PM     Date of procedure: 2/5/2020    Procedure performed by:  Des Murphy M.D    Provider assisted by: (see medication administration)    How tolerated by patient: tolerated the procedure well with no complications    Comments: none      IMAGING: XR of right shoulder dated 8/6/18 was reviewed and read: marked proximal migration of the humeral head with severe degenerative changes    IMPRESSION:      ICD-10-CM ICD-9-CM    1. Rotator cuff tear arthropathy of right shoulder M75.101 716.81 TRIAMCINOLONE ACETONIDE INJ    M12.811  triamcinolone acetonide (KENALOG) 40 mg/mL injection      US GUIDE INJ/ASP/ARTHRO LG JNT/BURSA        PLAN:   1. Pt presents today with right shoulder pain due to rotator cuff arthropathy and she is requesting another injection today. She had surgery scheduled for her right shoulder but did not move forward with this due to an abnormal EKG, is currently seeing a cardiologist for this. Risk factors include: htn  2. Yes cortisone injection indicated today R SHOULDER US  3. No Physical/Occupational Therapy indicated today  4. No diagnostic test indicated today  5. No durable medical equipment indicated today  6. No referral indicated today   7. No medications indicated today  8. No Narcotic indicated today    RTC 3 weeks if pain continues      Scribed by Garcia Martines [address] [place] [address] [address]) as dictated by Daisy Trevino MD    I, Dr. Daisy Trevino, confirm that all documentation is accurate.     Daisy Trevino M.D.   [facility]

## 2020-04-09 DIAGNOSIS — M12.811 ROTATOR CUFF TEAR ARTHROPATHY OF RIGHT SHOULDER: Primary | ICD-10-CM

## 2020-04-09 DIAGNOSIS — M75.101 ROTATOR CUFF TEAR ARTHROPATHY OF RIGHT SHOULDER: Primary | ICD-10-CM

## 2020-04-09 RX ORDER — CYCLOBENZAPRINE HCL 10 MG
10 TABLET ORAL
Qty: 30 TAB | Refills: 2 | Status: SHIPPED | OUTPATIENT
Start: 2020-04-09 | End: 2021-02-17 | Stop reason: SDUPTHER

## 2020-04-09 NOTE — TELEPHONE ENCOUNTER
Last Visit: 2/5/20 with MD Candace Roger  Next Appointment: none  Previous Refill Encounter(s): 11/4/19 #30 with 2 refills    Requested Prescriptions     Pending Prescriptions Disp Refills    cyclobenzaprine (FLEXERIL) 10 mg tablet 30 Tab 2     Sig: Take 1 Tab by mouth three (3) times daily as needed for Muscle Spasm(s).

## 2020-08-31 ENCOUNTER — OFFICE VISIT (OUTPATIENT)
Dept: ORTHOPEDIC SURGERY | Age: 75
End: 2020-08-31

## 2020-08-31 VITALS
DIASTOLIC BLOOD PRESSURE: 74 MMHG | SYSTOLIC BLOOD PRESSURE: 135 MMHG | HEIGHT: 56 IN | RESPIRATION RATE: 18 BRPM | TEMPERATURE: 97.8 F | WEIGHT: 143 LBS | OXYGEN SATURATION: 97 % | BODY MASS INDEX: 32.17 KG/M2 | HEART RATE: 70 BPM

## 2020-08-31 DIAGNOSIS — M75.101 ROTATOR CUFF TEAR ARTHROPATHY OF RIGHT SHOULDER: Primary | ICD-10-CM

## 2020-08-31 DIAGNOSIS — M12.811 ROTATOR CUFF TEAR ARTHROPATHY OF RIGHT SHOULDER: Primary | ICD-10-CM

## 2020-08-31 RX ORDER — TRIAMCINOLONE ACETONIDE 40 MG/ML
40 INJECTION, SUSPENSION INTRA-ARTICULAR; INTRAMUSCULAR ONCE
Qty: 1 ML | Refills: 0
Start: 2020-08-31 | End: 2020-08-31

## 2020-08-31 NOTE — PROGRESS NOTES
Marlena Jimenes  [birth year]   Chief Complaint   Patient presents with    Shoulder Pain     right        HISTORY OF PRESENT ILLNESS  Marlena Jimenes is a 67 y.o. female who presents today for reevaluation of right shoulder pain. Patient rates pain as 0/10 today. At last OV on 2/05/2020, patient had a right shoulder cortisone injection which provided relief but the pain has returned. She is requesting another injection today. The patient also had a cortisone injection on 11/04/19 which provided relief. She had surgery scheduled for the shoulder in the past but did not have the surgery due to an abnormal EKG. She has tried taking Tramadol, Tylenol, and \"arthritis pills\". Patient denies any fever, chills, chest pain, shortness of breath or calf pain. The remainder of the review of systems is negative. There are no new illness or injuries to report since last seen in the office. There are no changes to medications, allergies, family or social history. PHYSICAL EXAM:   Visit Vitals  /74 (BP 1 Location: Left arm, BP Patient Position: Sitting)   Pulse 70   Temp 97.8 °F (36.6 °C) (Temporal)   Resp 18   Ht 4' 8\" (1.422 m)   Wt 143 lb (64.9 kg)   SpO2 97%   BMI 32.06 kg/m²     The patient is a well-developed, well-nourished female   in no acute distress. The patient is alert and oriented times three. The patient is alert and oriented times three. Mood and affect are normal.  LYMPHATIC: lymph nodes are not enlarged and are within normal limits  SKIN: normal in color and non tender to palpation. There are no bruises or abrasions noted. NEUROLOGICAL: Motor sensory exam is within normal limits. Reflexes are equal bilaterally.  There is normal sensation to pinprick and light touch  MUSCULOSKELETAL:  Examination Right shoulder   Skin Intact   AC joint tenderness -   Biceps tenderness -   Forward flexion/Elevation    Active abduction    Glenohumeral abduction 80 with crepitus   External rotation ROM 50   Internal rotation ROM 30   Apprehension -   Carys Relocation -   Jerk -   Load and Shift -   Obriens -   Speeds -   Impingement sign -   Supraspinatus/Empty Can +4/5   External Rotation Strength +4/5   Lift Off/Belly Press -, 5/5   Neurovascular Intact      PROCEDURE: Right Shoulder Injection with Ultrasound Guidance  Indication:Right Shoulder pain/swelling    After sterile prep, 6 cc of Xylocaine and 1 cc of Kenalog were injected into the right shoulder. Ultrasound images captured using [address] Ultrasound machine and scanned into patient's chart. [facility] - [facility]  OFFICE PROCEDURE PROGRESS NOTE        Chart reviewed for the following:  Wilfredo Garcia M.D, have reviewed the History, Physical and updated the Allergic reactions for [address] [place] [address] performed immediately prior to start of procedure:  Wilfredo Garcia M.D, have performed the following reviews on [facility] prior to the start of the procedure:            * Patient was identified by name and date of birth   * Agreement on procedure being performed was verified  * Risks and Benefits explained to the patient  * Procedure site verified and marked as necessary  * Patient was positioned for comfort  * Consent was signed and verified     Time: 2:50 PM    Date of procedure: 8/31/2020    Procedure performed by:  Shadi Razo M.D    Provider assisted by: (see medication administration)    How tolerated by patient: tolerated the procedure well with no complications    Comments: none      IMAGING: CT of right shoulder dated 11/18/2019 was reviewed and read by Dr. Aleah Alvarez:   IMPRESSION:  1. Erosive and accommodative changes to the glenohumeral joint and calcifications in the joint space. This may reflect an inflammatory arthropathy and possible CPPD.    2.  Likely rotator cuff tears of the supraspinatus, infraspinatus and subscapularis with severe muscular atrophy. 3.  Accommodative changes to the undersurface of the acromion likely related to rotator cuff pathology. 4.  Os acromiale  5. Moderate AC erosive arthropathy with calcifications in the joint likely reflecting CPPD. XR of right shoulder dated 8/6/18 was reviewed and read: marked proximal migration of the humeral head with severe degenerative changes        IMPRESSION:      ICD-10-CM ICD-9-CM    1. Rotator cuff tear arthropathy of right shoulder  M75.101 716.81 TRIAMCINOLONE ACETONIDE INJ    M12.811  triamcinolone acetonide (Kenalog) 40 mg/mL injection      US GUIDE INJ/ASP/ARTHRO LG JNT/BURSA        PLAN:   1. Pt presents today with right shoulder pain due to rotator cuff arthropathy and I am hopeful a repeat cortisone injection today will help. Risk factors include: htn  2. Yes cortisone injection indicated today R SHOULDER US  3. No Physical/Occupational Therapy indicated today  4. No diagnostic test indicated today  5. No durable medical equipment indicated today  6. No referral indicated today   7. No medications indicated today  8. No Narcotic indicated today    RTC prn      Scribed by Alona Rao) as dictated by Elton Daniel MD    I, Dr. Elton Daniel, confirm that all documentation is accurate.     Elton Daniel M.D.   [facility]

## 2021-07-23 ENCOUNTER — OFFICE VISIT (OUTPATIENT)
Dept: ORTHOPEDIC SURGERY | Age: 76
End: 2021-07-23
Payer: MEDICARE

## 2021-07-23 VITALS
WEIGHT: 157.2 LBS | HEART RATE: 72 BPM | TEMPERATURE: 97.8 F | OXYGEN SATURATION: 98 % | BODY MASS INDEX: 35.36 KG/M2 | HEIGHT: 56 IN

## 2021-07-23 DIAGNOSIS — G56.03 BILATERAL CARPAL TUNNEL SYNDROME: Primary | ICD-10-CM

## 2021-07-23 DIAGNOSIS — M18.0 OSTEOARTHRITIS OF CARPOMETACARPAL (CMC) JOINT OF BOTH THUMBS: ICD-10-CM

## 2021-07-23 PROCEDURE — G8427 DOCREV CUR MEDS BY ELIG CLIN: HCPCS | Performed by: ORTHOPAEDIC SURGERY

## 2021-07-23 PROCEDURE — G8419 CALC BMI OUT NRM PARAM NOF/U: HCPCS | Performed by: ORTHOPAEDIC SURGERY

## 2021-07-23 PROCEDURE — G8432 DEP SCR NOT DOC, RNG: HCPCS | Performed by: ORTHOPAEDIC SURGERY

## 2021-07-23 PROCEDURE — 99214 OFFICE O/P EST MOD 30 MIN: CPT | Performed by: ORTHOPAEDIC SURGERY

## 2021-07-23 PROCEDURE — G8536 NO DOC ELDER MAL SCRN: HCPCS | Performed by: ORTHOPAEDIC SURGERY

## 2021-07-23 PROCEDURE — G8400 PT W/DXA NO RESULTS DOC: HCPCS | Performed by: ORTHOPAEDIC SURGERY

## 2021-07-23 PROCEDURE — 1101F PT FALLS ASSESS-DOCD LE1/YR: CPT | Performed by: ORTHOPAEDIC SURGERY

## 2021-07-23 PROCEDURE — 20526 THER INJECTION CARP TUNNEL: CPT | Performed by: ORTHOPAEDIC SURGERY

## 2021-07-23 PROCEDURE — 1090F PRES/ABSN URINE INCON ASSESS: CPT | Performed by: ORTHOPAEDIC SURGERY

## 2021-07-23 NOTE — PROGRESS NOTES
Kin Fine is a 67 y.o. female right handed retiree. Worker's Compensation and legal considerations: none filed. Vitals:    07/23/21 0803   Pulse: 72   Temp: 97.8 °F (36.6 °C)   TempSrc: Skin   SpO2: 98%   Weight: 157 lb 3.2 oz (71.3 kg)   Height: 4' 8\" (1.422 m)   PainSc:   2   PainLoc: Hand           Chief Complaint   Patient presents with    Hand Pain     bilateral, no SX, RX or injections         HPI: Patient presents today with complaints of bilateral hand pain and numbness. As she was leaving the exam room she also mentioned pain at the base of her thumbs. Recently had EMGs that was told was negative.     Date of onset: Chronic    Injury: No    Prior Treatment:  No    Numbness/ Tingling: Yes: Comment: Bilateral hands      ROS: Review of Systems - General ROS: negative  Psychological ROS: negative  ENT ROS: negative  Allergy and Immunology ROS: negative  Hematological and Lymphatic ROS: negative  Respiratory ROS: no cough, shortness of breath, or wheezing  Cardiovascular ROS: no chest pain or dyspnea on exertion  Gastrointestinal ROS: no abdominal pain, change in bowel habits, or black or bloody stools  Musculoskeletal ROS: positive for - pain in hand - bilateral  Neurological ROS: positive for - numbness/tingling  Dermatological ROS: negative    Past Medical History:   Diagnosis Date    Arthritis, left knee     Asthma     Back pain     Bilateral hand pain     Fuchs' corneal dystrophy     bilateral    GERD (gastroesophageal reflux disease)     Hypertension     Left shoulder pain     Osteoporosis     S/p knee replacement right        Past Surgical History:   Procedure Laterality Date    HX CHOLECYSTECTOMY      HX COLONOSCOPY      HX GYN      hysterectomy    HX HEENT      corneal transplants    HX HYSTERECTOMY      HX KNEE REPLACEMENT Right     HX ORTHOPAEDIC Bilateral     knee replacement     HX SHOULDER REPLACEMENT Left 2017    HX TONSILLECTOMY         Current Outpatient Medications   Medication Sig Dispense Refill    cyclobenzaprine (FLEXERIL) 10 mg tablet Take 1 Tab by mouth three (3) times daily as needed for Muscle Spasm(s). 30 Tab 2    losartan (COZAAR) 50 mg tablet Take  by mouth daily. Indications: high blood pressure      cholecalciferol, vitamin d3, (VITAMIN D3) 400 unit cap Take  by mouth daily.  trimethoprim-sulfamethoxazole (BACTRIM DS, SEPTRA DS) 160-800 mg per tablet Take 1 Tab by mouth two (2) times a day. 14 Tab 0    hydroCHLOROthiazide (HYDRODIURIL) 25 mg tablet Take 25 mg by mouth daily.  potassium chloride (K-DUR, KLOR-CON) 20 mEq tablet 20 mEq daily.  HYDROcodone-acetaminophen (NORCO) 5-325 mg per tablet Take 1 Tab by mouth every six (6) hours as needed for Pain. Max Daily Amount: 4 Tabs. 40 Tab 0    traMADol (ULTRAM) 50 mg tablet Take 50 mg by mouth two (2) times a day.  albuterol (PROVENTIL HFA, VENTOLIN HFA, PROAIR HFA) 90 mcg/actuation inhaler 2 Puffs.  diclofenac-miSOPROStol (ARTHROTEC 75)  mg-mcg per tablet Take 1 Tab by Mouth Twice Daily.  zolpidem (AMBIEN) 5 mg tablet 5 mg.  brinzolamide-brimonidine (SIMBRINZA) 1-0.2 % drps Instill 1 Drop in eye 3 Times Daily.  FLUoxetine (PROZAC) 20 mg capsule 20 mg daily.  fluticasone-salmeterol (ADVAIR DISKUS) 250-50 mcg/dose diskus inhaler 1 Puff.  albuterol (PROVENTIL HFA) 90 mcg/actuation inhaler Take 2 Puffs by inhalation every six (6) hours as needed for Wheezing.  omeprazole (PRILOSEC) 20 mg capsule Take 20 mg by mouth daily.  multivitamin (ONE A DAY) tablet Take 1 Tab by mouth daily.  Calcium-Cholecalciferol, D3, (CALCIUM 600 WITH VITAMIN D3) 600 mg(1,500mg) -400 unit cap Take  by mouth two (2) times a day.  BRINZOLAMIDE/BRIMONIDINE TART (SIMBRINZA OP) Apply 1 Drop to eye three (3) times daily.  fluorometholone (FML FORTE) 0.25 % ophthalmic suspension Administer 1 Drop to both eyes two (2) times a day.          No Known Allergies        PE:     Physical Exam  Vitals and nursing note reviewed. Constitutional:       General: She is not in acute distress. Appearance: Normal appearance. She is not ill-appearing. Cardiovascular:      Pulses: Normal pulses. Pulmonary:      Effort: Pulmonary effort is normal. No respiratory distress. Musculoskeletal:         General: Tenderness present. No swelling, deformity or signs of injury. Normal range of motion. Cervical back: Normal range of motion and neck supple. Right lower leg: No edema. Left lower leg: No edema. Skin:     General: Skin is warm and dry. Capillary Refill: Capillary refill takes less than 2 seconds. Findings: No bruising or erythema. Neurological:      General: No focal deficit present. Mental Status: She is alert and oriented to person, place, and time. Psychiatric:         Mood and Affect: Mood normal.         Behavior: Behavior normal.            Hand:    Examination L Digit(s) R Digit(s)   1st CMC Tenderness +  +    1st CMC Grind +  +    Rica Nodes -  -    Heberden Nodes -  -    A1 Pulley Tenderness -  -    Triggering -  -    UCL Instability -  -    RCL Instability -  -    Lateral Stress Pain -  -    Palmar Cords -  -    Tabletop test -  -    Garrod's Pads -  -     Strength       Pinch Strength         ROM: Full      NEUROVASCULAR    Examination L R Examination L R   Carpal Comp. + + Pronator Comp. - -   Carpal Tinel + + Pronator Tinel - -   Phalen's + + Pronator Stress - -   Cubital Comp. - - Guyon Comp. - -   Cubital Tinel - - Guyon Tinel - -   Elbow Hyperflexion - - Adson's - -   Spurling's - - SC Comp. - -   PCB Median abn - - SC Tinel - -   Radial Tinel - - IC Comp.  - -   Digital Tinel - - IC Tinel - -   Radial 2-Pt WNL WNL Ulnar 2-Pt WNL WNL     Radial Pulse: 2+  Capillary Refill: < 2 sec  Royce: Not Performed  Digital Royce: Not Performed      EMG dated 6/14/2021 is within normal limits without any signs of carpal tunnel syndrome or cervical radiculopathy. Imaging:     Deferred today        ICD-10-CM ICD-9-CM    1. Bilateral carpal tunnel syndrome  G56.03 354.0 triamcinolone acetonide (KENALOG) 10 mg/mL injection 10 mg      INJECT CARPAL TUNNEL      AMB SUPPLY ORDER   2. Osteoarthritis of carpometacarpal (CMC) joint of both thumbs  M18.0 715.34          Plan:     Bilateral carpal tunnel injections and braces. Follow-up and Dispositions    · Return if symptoms worsen or fail to improve, for Bilateral wrist x-rays on arrival.          Plan was reviewed with patient, who verbalized agreement and understanding of the Saman Igreja 25 NOTE        Chart reviewed for the following:   Lee LEYVA DO, have reviewed the History, Physical and updated the Allergic reactions for John Chavez performed immediately prior to start of procedure:   Stephenie LEYVA DO, have performed the following reviews on [facility] prior to the start of the procedure:            * Patient was identified by name and date of birth   * Agreement on procedure being performed was verified  * Risks and Benefits explained to the patient  * Procedure site verified and marked as necessary  * Patient was positioned for comfort  * Consent was signed and verified     Time: 08:19 AM      Date of procedure: 7/23/2021    Procedure performed by: Stephenie Begum DO    Provider assisted by: Jessica Abarca LPN    Patient assisted by: self    How tolerated by patient: tolerated the procedure well with no complications    Post Procedural Pain Scale: 0 - No Hurt    Comments: none    Procedure:  After consent was obtained, using sterile technique the bilateral carpal tunnels was prepped. Local anesthetic used: 1% lidocaine. Kenalog 5 mg X2 and was then injected and the needle withdrawn. The procedure was well tolerated.   The patient is asked to continue to rest the area for a few more days before resuming regular activities. It may be more painful for the first 1-2 days. Watch for fever, or increased swelling or persistent pain in the joint. Call or return to clinic prn if such symptoms occur or there is failure to improve as anticipated.

## 2021-08-09 ENCOUNTER — OFFICE VISIT (OUTPATIENT)
Dept: ORTHOPEDIC SURGERY | Age: 76
End: 2021-08-09
Payer: MEDICARE

## 2021-08-09 VITALS
OXYGEN SATURATION: 99 % | WEIGHT: 154 LBS | HEART RATE: 90 BPM | BODY MASS INDEX: 34.64 KG/M2 | RESPIRATION RATE: 15 BRPM | HEIGHT: 56 IN

## 2021-08-09 DIAGNOSIS — M18.0 ARTHRITIS OF CARPOMETACARPAL (CMC) JOINT OF BOTH THUMBS: Primary | ICD-10-CM

## 2021-08-09 DIAGNOSIS — G56.03 BILATERAL CARPAL TUNNEL SYNDROME: ICD-10-CM

## 2021-08-09 PROCEDURE — G8400 PT W/DXA NO RESULTS DOC: HCPCS | Performed by: ORTHOPAEDIC SURGERY

## 2021-08-09 PROCEDURE — G8536 NO DOC ELDER MAL SCRN: HCPCS | Performed by: ORTHOPAEDIC SURGERY

## 2021-08-09 PROCEDURE — G8510 SCR DEP NEG, NO PLAN REQD: HCPCS | Performed by: ORTHOPAEDIC SURGERY

## 2021-08-09 PROCEDURE — G8427 DOCREV CUR MEDS BY ELIG CLIN: HCPCS | Performed by: ORTHOPAEDIC SURGERY

## 2021-08-09 PROCEDURE — 73110 X-RAY EXAM OF WRIST: CPT | Performed by: ORTHOPAEDIC SURGERY

## 2021-08-09 PROCEDURE — 1101F PT FALLS ASSESS-DOCD LE1/YR: CPT | Performed by: ORTHOPAEDIC SURGERY

## 2021-08-09 PROCEDURE — 99213 OFFICE O/P EST LOW 20 MIN: CPT | Performed by: ORTHOPAEDIC SURGERY

## 2021-08-09 PROCEDURE — 1090F PRES/ABSN URINE INCON ASSESS: CPT | Performed by: ORTHOPAEDIC SURGERY

## 2021-08-09 PROCEDURE — 20600 DRAIN/INJ JOINT/BURSA W/O US: CPT | Performed by: ORTHOPAEDIC SURGERY

## 2021-08-09 PROCEDURE — G8417 CALC BMI ABV UP PARAM F/U: HCPCS | Performed by: ORTHOPAEDIC SURGERY

## 2021-08-09 NOTE — PROGRESS NOTES
Liseth De Leon is a 67 y.o. female right handed retiree. Worker's Compensation and legal considerations: none filed. Vitals:    08/09/21 1046   Pulse: 90   Resp: 15   SpO2: 99%   Weight: 154 lb (69.9 kg)   Height: 4' 8\" (1.422 m)   PainSc:   5   PainLoc: Hand           Chief Complaint   Patient presents with    Wrist Pain     Bilateral hand pain       HPI: Patient presents today for follow-up with complaints of continued pain at the base of her thumbs. Initial HPI: Patient presents today with complaints of bilateral hand pain and numbness. As she was leaving the exam room she also mentioned pain at the base of her thumbs. Recently had EMGs that was told was negative.     Date of onset: Chronic    Injury: No    Prior Treatment:  Yes: Comment: Bilateral carpal tunnel injections and braces    Numbness/ Tingling: Yes: Comment: Bilateral hands      ROS: Review of Systems - General ROS: negative  Psychological ROS: negative  ENT ROS: negative  Allergy and Immunology ROS: negative  Hematological and Lymphatic ROS: negative  Respiratory ROS: no cough, shortness of breath, or wheezing  Cardiovascular ROS: no chest pain or dyspnea on exertion  Gastrointestinal ROS: no abdominal pain, change in bowel habits, or black or bloody stools  Musculoskeletal ROS: positive for - pain in hand - bilateral  Neurological ROS: positive for - numbness/tingling  Dermatological ROS: negative    Past Medical History:   Diagnosis Date    Arthritis, left knee     Asthma     Back pain     Bilateral hand pain     Fuchs' corneal dystrophy     bilateral    GERD (gastroesophageal reflux disease)     Hypertension     Left shoulder pain     Osteoporosis     S/p knee replacement right        Past Surgical History:   Procedure Laterality Date    HX CHOLECYSTECTOMY      HX COLONOSCOPY      HX GYN      hysterectomy    HX HEENT      corneal transplants    HX HYSTERECTOMY      HX KNEE REPLACEMENT Right     HX ORTHOPAEDIC Bilateral knee replacement     HX SHOULDER REPLACEMENT Left 2017    HX TONSILLECTOMY         Current Outpatient Medications   Medication Sig Dispense Refill    cyclobenzaprine (FLEXERIL) 10 mg tablet Take 1 Tablet by mouth three (3) times daily as needed for Muscle Spasm(s). **DO NOT TAKE WITH TRAMADOL** 30 Tablet 2    losartan (COZAAR) 50 mg tablet Take  by mouth daily. Indications: high blood pressure      cholecalciferol, vitamin d3, (VITAMIN D3) 400 unit cap Take  by mouth daily.  trimethoprim-sulfamethoxazole (BACTRIM DS, SEPTRA DS) 160-800 mg per tablet Take 1 Tab by mouth two (2) times a day. 14 Tab 0    hydroCHLOROthiazide (HYDRODIURIL) 25 mg tablet Take 25 mg by mouth daily.  potassium chloride (K-DUR, KLOR-CON) 20 mEq tablet 20 mEq daily.  HYDROcodone-acetaminophen (NORCO) 5-325 mg per tablet Take 1 Tab by mouth every six (6) hours as needed for Pain. Max Daily Amount: 4 Tabs. 40 Tab 0    traMADol (ULTRAM) 50 mg tablet Take 50 mg by mouth two (2) times a day.  albuterol (PROVENTIL HFA, VENTOLIN HFA, PROAIR HFA) 90 mcg/actuation inhaler 2 Puffs.  diclofenac-miSOPROStol (ARTHROTEC 75)  mg-mcg per tablet Take 1 Tab by Mouth Twice Daily.  zolpidem (AMBIEN) 5 mg tablet 5 mg.  brinzolamide-brimonidine (SIMBRINZA) 1-0.2 % drps Instill 1 Drop in eye 3 Times Daily.  FLUoxetine (PROZAC) 20 mg capsule 20 mg daily.  fluticasone-salmeterol (ADVAIR DISKUS) 250-50 mcg/dose diskus inhaler 1 Puff.  albuterol (PROVENTIL HFA) 90 mcg/actuation inhaler Take 2 Puffs by inhalation every six (6) hours as needed for Wheezing.  omeprazole (PRILOSEC) 20 mg capsule Take 20 mg by mouth daily.  multivitamin (ONE A DAY) tablet Take 1 Tab by mouth daily.  Calcium-Cholecalciferol, D3, (CALCIUM 600 WITH VITAMIN D3) 600 mg(1,500mg) -400 unit cap Take  by mouth two (2) times a day.       BRINZOLAMIDE/BRIMONIDINE TART (SIMBRINZA OP) Apply 1 Drop to eye three (3) times daily.      fluorometholone (FML FORTE) 0.25 % ophthalmic suspension Administer 1 Drop to both eyes two (2) times a day. No Known Allergies        PE:     Physical Exam  Vitals and nursing note reviewed. Constitutional:       General: She is not in acute distress. Appearance: Normal appearance. She is not ill-appearing. Cardiovascular:      Pulses: Normal pulses. Pulmonary:      Effort: Pulmonary effort is normal. No respiratory distress. Musculoskeletal:         General: Tenderness present. No swelling, deformity or signs of injury. Normal range of motion. Cervical back: Normal range of motion and neck supple. Right lower leg: No edema. Left lower leg: No edema. Skin:     General: Skin is warm and dry. Capillary Refill: Capillary refill takes less than 2 seconds. Findings: No bruising or erythema. Neurological:      General: No focal deficit present. Mental Status: She is alert and oriented to person, place, and time. Psychiatric:         Mood and Affect: Mood normal.         Behavior: Behavior normal.            Hand:    Examination L Digit(s) R Digit(s)   1st CMC Tenderness +  +    1st CMC Grind +  +    Rica Nodes -  -    Heberden Nodes -  -    A1 Pulley Tenderness -  -    Triggering -  -    UCL Instability -  -    RCL Instability -  -    Lateral Stress Pain -  -    Palmar Cords -  -    Tabletop test -  -    Garrod's Pads -  -     Strength       Pinch Strength         ROM: Full      NEUROVASCULAR    Examination L R Examination L R   Carpal Comp. + + Pronator Comp. - -   Carpal Tinel + + Pronator Tinel - -   Phalen's + + Pronator Stress - -   Cubital Comp. - - Guyon Comp. - -   Cubital Tinel - - Guyon Tinel - -   Elbow Hyperflexion - - Adson's - -   Spurling's - - SC Comp. - -   PCB Median abn - - SC Tinel - -   Radial Tinel - - IC Comp.  - -   Digital Tinel - - IC Tinel - -   Radial 2-Pt WNL WNL Ulnar 2-Pt WNL WNL     Radial Pulse: 2+  Capillary Refill: < 2 sec  Royce: Not Performed  [employer]: Not Performed      EMG dated 2021 is within normal limits without any signs of carpal tunnel syndrome or cervical radiculopathy. Imagin-08-09 3 views of bilateral wrists is significant for moderate to severe degenerative changes at the thumb CMC joints. ICD-10-CM ICD-9-CM    1. Arthritis of carpometacarpal (CMC) joint of both thumbs  M18.0 716.94 AMB POC XRAY, WRIST; COMPLETE, 3+ VIE      AMB POC XRAY, WRIST; COMPLETE, 3+ VIE      DRAIN/INJECT SMALL JOINT/BURSA      triamcinolone acetonide (KENALOG) 10 mg/mL injection 10 mg      AMB SUPPLY ORDER   2. Bilateral carpal tunnel syndrome  G56.03 354.0          Plan:     Bilateral thumb CMC joint injections and braces. Follow-up and Dispositions    · Return if symptoms worsen or fail to improve. Plan was reviewed with patient, who verbalized agreement and understanding of the plan    [address] [place] [address] NOTE        Chart reviewed for the following:   Lee LEYVA DO, have reviewed the History, Physical and updated the Allergic reactions for [address] [place] [address] performed immediately prior to start of procedure:   Cyndie LEYVA DO, have performed the following reviews on Ellie Tayler prior to the start of the procedure:            * Patient was identified by name and date of birth   * Agreement on procedure being performed was verified  * Risks and Benefits explained to the patient  * Procedure site verified and marked as necessary  * Patient was positioned for comfort  * Consent was signed and verified     Time: 11:30 AM      Date of procedure: 2021    Procedure performed by:   Cyndie Handley DO    Provider assisted by: Meron Diaz LPN    Patient assisted by: self    How tolerated by patient: tolerated the procedure well with no complications    Post Procedural Pain Scale: 0 - No Ina    Comments: none    Procedure:  After consent was obtained, using sterile technique the bilateral thumbs was prepped. Local anesthetic used: 1% lidocaine. Kenalog 5 mg X2 and was then injected and the needle withdrawn. The procedure was well tolerated. The patient is asked to continue to rest the area for a few more days before resuming regular activities. It may be more painful for the first 1-2 days. Watch for fever, or increased swelling or persistent pain in the joint. Call or return to clinic prn if such symptoms occur or there is failure to improve as anticipated.

## 2021-08-17 ENCOUNTER — OFFICE VISIT (OUTPATIENT)
Dept: ORTHOPEDIC SURGERY | Age: 76
End: 2021-08-17

## 2021-08-17 VITALS
TEMPERATURE: 97 F | OXYGEN SATURATION: 99 % | HEART RATE: 64 BPM | HEIGHT: 56 IN | RESPIRATION RATE: 16 BRPM | WEIGHT: 158 LBS | BODY MASS INDEX: 35.54 KG/M2

## 2021-08-17 DIAGNOSIS — M75.101 ROTATOR CUFF TEAR ARTHROPATHY OF RIGHT SHOULDER: Primary | ICD-10-CM

## 2021-08-17 DIAGNOSIS — M12.811 ROTATOR CUFF TEAR ARTHROPATHY OF RIGHT SHOULDER: Primary | ICD-10-CM

## 2021-08-17 PROCEDURE — G8510 SCR DEP NEG, NO PLAN REQD: HCPCS | Performed by: ORTHOPAEDIC SURGERY

## 2021-08-17 PROCEDURE — 20611 DRAIN/INJ JOINT/BURSA W/US: CPT | Performed by: ORTHOPAEDIC SURGERY

## 2021-08-17 PROCEDURE — G8427 DOCREV CUR MEDS BY ELIG CLIN: HCPCS | Performed by: ORTHOPAEDIC SURGERY

## 2021-08-17 PROCEDURE — 99214 OFFICE O/P EST MOD 30 MIN: CPT | Performed by: ORTHOPAEDIC SURGERY

## 2021-08-17 PROCEDURE — 1090F PRES/ABSN URINE INCON ASSESS: CPT | Performed by: ORTHOPAEDIC SURGERY

## 2021-08-17 PROCEDURE — 1101F PT FALLS ASSESS-DOCD LE1/YR: CPT | Performed by: ORTHOPAEDIC SURGERY

## 2021-08-17 PROCEDURE — G8417 CALC BMI ABV UP PARAM F/U: HCPCS | Performed by: ORTHOPAEDIC SURGERY

## 2021-08-17 PROCEDURE — G8536 NO DOC ELDER MAL SCRN: HCPCS | Performed by: ORTHOPAEDIC SURGERY

## 2021-08-17 PROCEDURE — G8400 PT W/DXA NO RESULTS DOC: HCPCS | Performed by: ORTHOPAEDIC SURGERY

## 2021-08-17 RX ORDER — TRIAMCINOLONE ACETONIDE 40 MG/ML
40 INJECTION, SUSPENSION INTRA-ARTICULAR; INTRAMUSCULAR ONCE
Status: COMPLETED | OUTPATIENT
Start: 2021-08-17 | End: 2021-08-17

## 2021-08-17 RX ADMIN — TRIAMCINOLONE ACETONIDE 40 MG: 40 INJECTION, SUSPENSION INTRA-ARTICULAR; INTRAMUSCULAR at 16:10

## 2021-08-17 NOTE — PROGRESS NOTES
Mandeep Beauchamp  [birth year]   Chief Complaint   Patient presents with    Shoulder Pain     right shoulder pain f/u        HISTORY OF PRESENT ILLNESS  Mandeep Beauchamp is a 67 y.o. female who presents today for reevaluation of right shoulder pain. Patient rates pain as 10/10 today. At last OV on 8/31/2020, patient had a right shoulder cortisone injection which provided relief but the pain has returned. She is requesting another injection today. She had surgery scheduled for the shoulder in the past but did not have the surgery due to an abnormal EKG. She has tried taking Tramadol, Tylenol, and \"arthritis pills\". Patient denies any fever, chills, chest pain, shortness of breath or calf pain. The remainder of the review of systems is negative. There are no new illness or injuries to report since last seen in the office. There are no changes to medications, allergies, family or social history. PHYSICAL EXAM:   Visit Vitals  Pulse 64   Temp 97 °F (36.1 °C) (Temporal)   Resp 16   Ht 4' 8\" (1.422 m)   Wt 158 lb (71.7 kg)   SpO2 99%   BMI 35.42 kg/m²     The patient is a well-developed, well-nourished female   in no acute distress. The patient is alert and oriented times three. The patient is alert and oriented times three. Mood and affect are normal.  LYMPHATIC: lymph nodes are not enlarged and are within normal limits  SKIN: normal in color and non tender to palpation. There are no bruises or abrasions noted. NEUROLOGICAL: Motor sensory exam is within normal limits. Reflexes are equal bilaterally.  There is normal sensation to pinprick and light touch  MUSCULOSKELETAL:  Examination Right shoulder   Skin Intact   AC joint tenderness -   Biceps tenderness -   Forward flexion/Elevation    Active abduction    Glenohumeral abduction 80 with crepitus   External rotation ROM 50   Internal rotation ROM 30   Apprehension -   Carys Relocation -   Jerk -   Load and Shift -   Obriens -   Speeds - Impingement sign -   Supraspinatus/Empty Can +4/5   External Rotation Strength +4/5   Lift Off/Belly Press -, 5/5   Neurovascular Intact      PROCEDURE:   Right Shoulder Injection with Ultrasound Guidance    Indication:Right Shoulder pain/swelling    After sterile prep, 6 cc of Xylocaine and 1 cc of Kenalog were injected into the right Shoulder. Intra-articular Ultrasound images captured using [address] Ultrasound machine using a frequency of 10 MHz with a linear transducer and scanned into patient's chart. [facility]  OFFICE PROCEDURE PROGRESS NOTE        Chart reviewed for the following:  Anat Sevilla M.D, have reviewed the History, Physical and updated the Allergic reactions for [address] [place] [address] performed immediately prior to start of procedure:  Anat Sevilla M.D, have performed the following reviews on Marnie Ambrosio prior to the start of the procedure:            * Patient was identified by name and date of birth   * Agreement on procedure being performed was verified  * Risks and Benefits explained to the patient  * Procedure site verified and marked as necessary  * Patient was positioned for comfort  * Needle placement confirmed by ultrasound  * Consent was signed and verified     Time: 2:08 PM     Date of procedure: 8/17/2021    Procedure performed by:  Lexi Mcfarland M.D    Provider assisted by: (see medication administration)    How tolerated by patient: tolerated the procedure well with no complications    Comments: none      IMAGING: CT of right shoulder dated 11/18/2019 was reviewed and read by Dr. Carter Lew:   IMPRESSION:  1. Erosive and accommodative changes to the glenohumeral joint and calcifications in the joint space. This may reflect an inflammatory arthropathy and possible CPPD.    2.  Likely rotator cuff tears of the supraspinatus, infraspinatus and subscapularis with severe muscular atrophy. 3.  Accommodative changes to the undersurface of the acromion likely related to rotator cuff pathology. 4.  Os acromiale  5. Moderate AC erosive arthropathy with calcifications in the joint likely reflecting CPPD. XR of right shoulder dated 8/6/18 was reviewed and read: marked proximal migration of the humeral head with severe degenerative changes        IMPRESSION:      ICD-10-CM ICD-9-CM    1. Rotator cuff tear arthropathy of right shoulder  M75.101 716.81 ARTHROCENTESIS ASPIR&/INJ MAJOR JT/BURSA W/US    M12.811  triamcinolone acetonide (KENALOG-40) 40 mg/mL injection 40 mg        PLAN:   1. Pt presents today with right shoulder pain due to rotator cuff arthropathy and I am hopeful a repeat cortisone injection today will help. Risk factors include: htn  2. Yes cortisone injection indicated today R SHOULDER US   3. No Physical/Occupational Therapy indicated today  4. No diagnostic test indicated today  5. No durable medical equipment indicated today  6. No referral indicated today   7. No medications indicated today  8. No Narcotic indicated today    RTC prn      Scribed by Tiny Hidalgo [address]) as dictated by Shaista Ambrosio MD    I, Dr. Shaista Ambrosio, confirm that all documentation is accurate.     Shaista Ambrosio M.D.   [facility]

## 2022-03-19 PROBLEM — M12.819 ROTATOR CUFF TEAR ARTHROPATHY: Status: ACTIVE | Noted: 2017-01-11

## 2022-03-19 PROBLEM — M75.100 ROTATOR CUFF TEAR ARTHROPATHY: Status: ACTIVE | Noted: 2017-01-11

## 2022-09-02 ENCOUNTER — OFFICE VISIT (OUTPATIENT)
Dept: ORTHOPEDIC SURGERY | Age: 77
End: 2022-09-02
Payer: MEDICARE

## 2022-09-02 DIAGNOSIS — M16.12 OSTEOARTHRITIS OF LEFT HIP, UNSPECIFIED OSTEOARTHRITIS TYPE: ICD-10-CM

## 2022-09-02 DIAGNOSIS — M19.90 ARTHRITIS: ICD-10-CM

## 2022-09-02 DIAGNOSIS — M25.552 LEFT HIP PAIN: Primary | ICD-10-CM

## 2022-09-02 PROCEDURE — 1090F PRES/ABSN URINE INCON ASSESS: CPT | Performed by: PHYSICIAN ASSISTANT

## 2022-09-02 PROCEDURE — G8421 BMI NOT CALCULATED: HCPCS | Performed by: PHYSICIAN ASSISTANT

## 2022-09-02 PROCEDURE — G8536 NO DOC ELDER MAL SCRN: HCPCS | Performed by: PHYSICIAN ASSISTANT

## 2022-09-02 PROCEDURE — 1101F PT FALLS ASSESS-DOCD LE1/YR: CPT | Performed by: PHYSICIAN ASSISTANT

## 2022-09-02 PROCEDURE — 99204 OFFICE O/P NEW MOD 45 MIN: CPT | Performed by: PHYSICIAN ASSISTANT

## 2022-09-02 PROCEDURE — G8400 PT W/DXA NO RESULTS DOC: HCPCS | Performed by: PHYSICIAN ASSISTANT

## 2022-09-02 PROCEDURE — G8427 DOCREV CUR MEDS BY ELIG CLIN: HCPCS | Performed by: PHYSICIAN ASSISTANT

## 2022-09-02 PROCEDURE — 1123F ACP DISCUSS/DSCN MKR DOCD: CPT | Performed by: PHYSICIAN ASSISTANT

## 2022-09-02 PROCEDURE — G8432 DEP SCR NOT DOC, RNG: HCPCS | Performed by: PHYSICIAN ASSISTANT

## 2022-09-02 NOTE — PROGRESS NOTES
[address] [place] [address]  [phone]           Patient: Elroy Areas                MRN: [id number]       SSN: [id number]  YOB: 1945        AGE: 65 y.o. SEX: female  There is no height or weight on file to calculate BMI. PCP: Roosevelt Lewis MD  09/02/22      This office note has been dictated. REVIEW OF SYSTEMS:  Constitutional: Negative for fever, chills, weight loss and malaise/fatigue. HENT: Negative. Eyes: Negative. Respiratory: Negative. Cardiovascular: Negative. Gastrointestinal: No bowel incontinence or constipation. Genitourinary: No bladder incontinence or saddle anesthesia. Skin: Negative. Neurological: Negative. Endo/Heme/Allergies: Negative. Psychiatric/Behavioral: Negative. Musculoskeletal: As per HPI above. Past Medical History:   Diagnosis Date    Arthritis, left knee     Asthma     Back pain     Bilateral hand pain     Fuchs' corneal dystrophy     bilateral    GERD (gastroesophageal reflux disease)     Hypertension     Left shoulder pain     Osteoporosis     S/p knee replacement right          Current Outpatient Medications:     cyclobenzaprine (FLEXERIL) 10 mg tablet, Take 1 Tablet by mouth three (3) times daily as needed for Muscle Spasm(s). **DO NOT TAKE WITH TRAMADOL**, Disp: 30 Tablet, Rfl: 2    losartan (COZAAR) 50 mg tablet, Take  by mouth daily. Indications: high blood pressure, Disp: , Rfl:     cholecalciferol, vitamin d3, (VITAMIN D3) 400 unit cap, Take  by mouth daily. , Disp: , Rfl:     trimethoprim-sulfamethoxazole (BACTRIM DS, SEPTRA DS) 160-800 mg per tablet, Take 1 Tab by mouth two (2) times a day. (Patient not taking: Reported on 8/17/2021), Disp: 14 Tab, Rfl: 0    hydroCHLOROthiazide (HYDRODIURIL) 25 mg tablet, Take 25 mg by mouth daily. , Disp: , Rfl:     potassium chloride (K-DUR, KLOR-CON) 20 mEq tablet, 20 mEq daily. , Disp: , Rfl: HYDROcodone-acetaminophen (NORCO) 5-325 mg per tablet, Take 1 Tab by mouth every six (6) hours as needed for Pain. Max Daily Amount: 4 Tabs. (Patient not taking: Reported on 8/17/2021), Disp: 40 Tab, Rfl: 0    traMADol (ULTRAM) 50 mg tablet, Take 50 mg by mouth two (2) times a day., Disp: , Rfl:     albuterol (PROVENTIL HFA, VENTOLIN HFA, PROAIR HFA) 90 mcg/actuation inhaler, 2 Puffs., Disp: , Rfl:     diclofenac-miSOPROStol (ARTHROTEC 75)  mg-mcg per tablet, Take 1 Tab by Mouth Twice Daily. , Disp: , Rfl:     zolpidem (AMBIEN) 5 mg tablet, 5 mg., Disp: , Rfl:     brinzolamide-brimonidine (SIMBRINZA) 1-0.2 % drps, Instill 1 Drop in eye 3 Times Daily. , Disp: , Rfl:     FLUoxetine (PROZAC) 20 mg capsule, 20 mg daily. , Disp: , Rfl:     fluticasone-salmeterol (ADVAIR DISKUS) 250-50 mcg/dose diskus inhaler, 1 Puff., Disp: , Rfl:     albuterol (PROVENTIL HFA) 90 mcg/actuation inhaler, Take 2 Puffs by inhalation every six (6) hours as needed for Wheezing., Disp: , Rfl:     omeprazole (PRILOSEC) 20 mg capsule, Take 20 mg by mouth daily. , Disp: , Rfl:     multivitamin (ONE A DAY) tablet, Take 1 Tab by mouth daily. , Disp: , Rfl:     Calcium-Cholecalciferol, D3, (CALCIUM 600 WITH VITAMIN D3) 600 mg(1,500mg) -400 unit cap, Take  by mouth two (2) times a day., Disp: , Rfl:     BRINZOLAMIDE/BRIMONIDINE TART (SIMBRINZA OP), Apply 1 Drop to eye three (3) times daily. , Disp: , Rfl:     fluorometholone (FML FORTE) 0.25 % ophthalmic suspension, Administer 1 Drop to both eyes two (2) times a day., Disp: , Rfl:     No Known Allergies    Social History     Socioeconomic History    Marital status:      Spouse name: Not on file    Number of children: Not on file    Years of education: Not on file    Highest education level: Not on file   Occupational History    Not on file   Tobacco Use    Smoking status: Never    Smokeless tobacco: Never   Vaping Use    Vaping Use: Never used   Substance and Sexual Activity    Alcohol use:  No Drug use: No    Sexual activity: Not on file   Other Topics Concern    Not on file   Social History Narrative    Not on file     Social Determinants of Health     Financial Resource Strain: Not on file   Food Insecurity: Not on file   Transportation Needs: Not on file   Physical Activity: Inactive    Days of Exercise per Week: 0 days    Minutes of Exercise per Session: 0 min   Stress: Not on file   Social Connections: Not on file   Intimate Partner Violence: Not At Risk    Fear of Current or Ex-Partner: No    Emotionally Abused: No    Physically Abused: No    Sexually Abused: No   Housing Stability: Not on file       Past Surgical History:   Procedure Laterality Date    HX CHOLECYSTECTOMY      HX COLONOSCOPY      HX GYN      hysterectomy    HX HEENT      corneal transplants    HX HYSTERECTOMY      HX KNEE REPLACEMENT Right     HX ORTHOPAEDIC Bilateral     knee replacement     HX SHOULDER REPLACEMENT Left 2017    HX TONSILLECTOMY           Patient seen evaluated today with complaints of left hip pain. The patient is known to us that she has had previous knee replacements. Is been a number years since we have seen her. Over the past couple months has been having some increasing hip pain. She has trouble with ambulating. She has trouble put on her shoes and socks. She has pain at night. She does have problems with her neck and back. These were improved as she had a C-spine surgery by her neurosurgeon and as well as had epidural steroid injections for her back. Patient denies recent fevers, chills, chest pain, SOB, or injuries. No recent systemic changes noted. A 12-point review of systems is performed today. Pertinent positives are noted. All other systems reviewed and otherwise are negative. Physical exam: General: Alert and oriented x3, nad.  well-developed, well nourished. normal affect, AF. NC/AT, EOMI, neck supple, trachea midline, no JVD present. Breathing is non-labored.   Examination of the lower extremities reveals decreased range of motion of the left hip with reproduction of groin thigh discomfort. Negative straight leg raise. Negative calf tenderness. Negative Homans. No signs of DVT present. Review of radiographs done 8/9/2022 from [facility] including AP pelvis and frog-leg lateral of the left hip does confirm end-stage arthritis of bone-on-bone eburnation. Osteopenia of the bone is noted. Review of previous bone density test:  Consistent with osteopenia    Assessment: Left hip end-stage osteoarthritis    Plan: At this point we discussed treatment options. We will move forward with getting her scheduled for a left total hip replacement. Alternatives, risk, complications, expected outcome were discussed. The patient understands wish to proceed.               JR Cristobal ESCOBAR, RADHA, ATC

## 2022-12-02 ENCOUNTER — OFFICE VISIT (OUTPATIENT)
Dept: ORTHOPEDIC SURGERY | Age: 77
End: 2022-12-02
Payer: MEDICARE

## 2022-12-02 VITALS — BODY MASS INDEX: 35.42 KG/M2 | WEIGHT: 158 LBS

## 2022-12-02 DIAGNOSIS — M25.552 HIP PAIN, LEFT: Primary | ICD-10-CM

## 2022-12-02 DIAGNOSIS — Z87.898 HISTORY OF CHEST PAIN: ICD-10-CM

## 2022-12-02 DIAGNOSIS — J45.909 ASTHMA, UNSPECIFIED ASTHMA SEVERITY, UNSPECIFIED WHETHER COMPLICATED, UNSPECIFIED WHETHER PERSISTENT: ICD-10-CM

## 2022-12-02 NOTE — PROGRESS NOTES
Patient: Sen Mary                MRN: [id number]       SSN: [id number]  YOB: 1945        AGE: 65 y.o. SEX: female  Body mass index is 35.42 kg/m².     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 and I watched her walk its a significantly antalgic gait the and she is on a walker full-time the hip does not move very well only a jog of rotation she is got a leg length discrepancy calf nontender Joelle Givens' sign is negative noticed in her files she has a cardiac and lung history so the sooner we can get cardiac and pulmonary clearance we can move her surgery up is scheduled for February we should be able to get it done in early January with its been a pleasure to share in her care we did discuss risks and benefits associated with the operatio    Risk and benefits involving total hip replacement were described including but not limited to infection DVT pulmonary embolism anesthetic complications blood loss requiring transfusion dislocation leg length discrepancy limp fracture chronic pain bursitis    X-rays 3 views left hip 12/2/2022 confirms avascular necrosis left hip soup some mild superior migration of the cup chronic bursitis left hip osteoporosis    REVIEW OF SYSTEMS:      CON: negative  EYE: negative   ENT: negative  RESP: negative  GI:    negative   :  negative  MSK: Positive  A twelve point review of systems was completed, positives noted and all other systems were reviewed and are negative          Past Medical History:   Diagnosis Date    Arthritis, left knee     Asthma     Back pain     Bilateral hand pain     Fuchs' corneal dystrophy     bilateral    GERD (gastroesophageal reflux disease)     Hypertension     Left shoulder pain     Osteoporosis     S/p knee replacement right        Family History   Problem Relation Age of Onset    OSTEOARTHRITIS Other     Diabetes Father     Heart Disease Father     Cancer Mother        Current Outpatient Medications   Medication Sig Dispense Refill    cyclobenzaprine (FLEXERIL) 10 mg tablet Take 1 Tablet by mouth three (3) times daily as needed for Muscle Spasm(s). **DO NOT TAKE WITH TRAMADOL** 30 Tablet 2    losartan (COZAAR) 50 mg tablet Take  by mouth daily. Indications: high blood pressure      cholecalciferol, vitamin d3, 10 mcg (400 unit) cap Take  by mouth daily. hydroCHLOROthiazide (HYDRODIURIL) 25 mg tablet Take 25 mg by mouth daily. potassium chloride (K-DUR, KLOR-CON) 20 mEq tablet 20 mEq daily. traMADol (ULTRAM) 50 mg tablet Take 50 mg by mouth two (2) times a day. albuterol (PROVENTIL HFA, VENTOLIN HFA, PROAIR HFA) 90 mcg/actuation inhaler 2 Puffs. diclofenac-miSOPROStol (ARTHROTEC 75)  mg-mcg per tablet Take 1 Tab by Mouth Twice Daily. zolpidem (AMBIEN) 5 mg tablet 5 mg.      brinzolamide-brimonidine 1-0.2 % drps Instill 1 Drop in eye 3 Times Daily. FLUoxetine (PROzac) 20 mg capsule 20 mg daily. fluticasone propion-salmeteroL (ADVAIR/WIXELA) 250-50 mcg/dose diskus inhaler 1 Puff.      albuterol (PROVENTIL HFA, VENTOLIN HFA, PROAIR HFA) 90 mcg/actuation inhaler Take 2 Puffs by inhalation every six (6) hours as needed for Wheezing. omeprazole (PRILOSEC) 20 mg capsule Take 20 mg by mouth daily. multivitamin (ONE A DAY) tablet Take 1 Tab by mouth daily. Calcium-Cholecalciferol, D3, 600 mg-10 mcg (400 unit) cap Take  by mouth two (2) times a day. BRINZOLAMIDE/BRIMONIDINE TART (SIMBRINZA OP) Apply 1 Drop to eye three (3) times daily. fluorometholone (FML FORTE) 0.25 % ophthalmic suspension Administer 1 Drop to both eyes two (2) times a day. trimethoprim-sulfamethoxazole (BACTRIM DS, SEPTRA DS) 160-800 mg per tablet Take 1 Tab by mouth two (2) times a day.  (Patient not taking: No sig reported) 14 Tab 0       No Known Allergies    Past Surgical History:   Procedure Laterality Date    HX CHOLECYSTECTOMY      HX COLONOSCOPY      HX GYN      hysterectomy    HX HEENT      corneal transplants    HX HYSTERECTOMY      HX KNEE REPLACEMENT Right     HX ORTHOPAEDIC Bilateral     knee replacement     HX SHOULDER REPLACEMENT Left 2017    HX TONSILLECTOMY         Social History     Socioeconomic History    Marital status:      Spouse name: Not on file    Number of children: Not on file    Years of education: Not on file    Highest education level: Not on file   Occupational History    Not on file   Tobacco Use    Smoking status: Never    Smokeless tobacco: Never   Vaping Use    Vaping Use: Never used   Substance and Sexual Activity    Alcohol use: No    Drug use: No    Sexual activity: Not on file   Other Topics Concern    Not on file   Social History Narrative    Not on file     Social Determinants of Health     Financial Resource Strain: Not on file   Food Insecurity: Not on file   Transportation Needs: Not on file   Physical Activity: Inactive    Days of Exercise per Week: 0 days    Minutes of Exercise per Session: 0 min   Stress: Not on file   Social Connections: Not on file   Intimate Partner Violence: Not At Risk    Fear of Current or Ex-Partner: No    Emotionally Abused: No    Physically Abused: No    Sexually Abused: No   Housing Stability: Not on file       Visit Vitals  Wt 71.7 kg (158 lb)   BMI 35.42 kg/m²         PHYSICAL EXAMINATION:  GENERAL: Alert and oriented x3, in no acute distress, well-developed, well-nourished, afebrile. HEART: No JVD. EYES: No scleral icterus   NECK: No significant lymphadenopathy   LUNGS: No respiratory compromise or indrawing  ABDOMEN: Soft, non-tender, non-distended. Note: This note was completed using voice recognition software. Any typographical/name errors or mistakes are unintentional.    Electronically signed by:  Tyler Arevalo Carlos A Ledbetter MD

## 2022-12-07 ENCOUNTER — OFFICE VISIT (OUTPATIENT)
Dept: CARDIOLOGY CLINIC | Age: 77
End: 2022-12-07
Payer: MEDICARE

## 2022-12-07 VITALS
HEIGHT: 56 IN | SYSTOLIC BLOOD PRESSURE: 128 MMHG | DIASTOLIC BLOOD PRESSURE: 74 MMHG | HEART RATE: 97 BPM | OXYGEN SATURATION: 98 % | BODY MASS INDEX: 34.64 KG/M2 | WEIGHT: 154 LBS

## 2022-12-07 DIAGNOSIS — R06.02 SOB (SHORTNESS OF BREATH) ON EXERTION: ICD-10-CM

## 2022-12-07 DIAGNOSIS — R94.31 ABNORMAL EKG: Primary | ICD-10-CM

## 2022-12-07 DIAGNOSIS — I10 ESSENTIAL HYPERTENSION: ICD-10-CM

## 2022-12-07 DIAGNOSIS — Z01.810 PREOP CARDIOVASCULAR EXAM: ICD-10-CM

## 2022-12-07 PROCEDURE — G8417 CALC BMI ABV UP PARAM F/U: HCPCS | Performed by: INTERNAL MEDICINE

## 2022-12-07 PROCEDURE — G8427 DOCREV CUR MEDS BY ELIG CLIN: HCPCS | Performed by: INTERNAL MEDICINE

## 2022-12-07 PROCEDURE — G8400 PT W/DXA NO RESULTS DOC: HCPCS | Performed by: INTERNAL MEDICINE

## 2022-12-07 PROCEDURE — 3074F SYST BP LT 130 MM HG: CPT | Performed by: INTERNAL MEDICINE

## 2022-12-07 PROCEDURE — 93000 ELECTROCARDIOGRAM COMPLETE: CPT | Performed by: INTERNAL MEDICINE

## 2022-12-07 PROCEDURE — 1090F PRES/ABSN URINE INCON ASSESS: CPT | Performed by: INTERNAL MEDICINE

## 2022-12-07 PROCEDURE — 3078F DIAST BP <80 MM HG: CPT | Performed by: INTERNAL MEDICINE

## 2022-12-07 PROCEDURE — 1123F ACP DISCUSS/DSCN MKR DOCD: CPT | Performed by: INTERNAL MEDICINE

## 2022-12-07 PROCEDURE — G8536 NO DOC ELDER MAL SCRN: HCPCS | Performed by: INTERNAL MEDICINE

## 2022-12-07 PROCEDURE — G8510 SCR DEP NEG, NO PLAN REQD: HCPCS | Performed by: INTERNAL MEDICINE

## 2022-12-07 PROCEDURE — 99204 OFFICE O/P NEW MOD 45 MIN: CPT | Performed by: INTERNAL MEDICINE

## 2022-12-07 PROCEDURE — 1101F PT FALLS ASSESS-DOCD LE1/YR: CPT | Performed by: INTERNAL MEDICINE

## 2022-12-07 RX ORDER — GABAPENTIN 100 MG/1
CAPSULE ORAL
COMMUNITY
Start: 2022-08-02

## 2022-12-07 RX ORDER — TIZANIDINE 2 MG/1
TABLET ORAL
COMMUNITY
Start: 2022-10-28

## 2022-12-07 RX ORDER — ALENDRONATE SODIUM 70 MG/1
70 TABLET ORAL
COMMUNITY
Start: 2022-10-27

## 2022-12-07 RX ORDER — MECLIZINE HYDROCHLORIDE 25 MG/1
25 TABLET ORAL
COMMUNITY

## 2022-12-07 NOTE — PROGRESS NOTES
HISTORY OF PRESENT ILLNESS  Jenni Martínez is a 65 y.o. female. 12/22 seen as a new patient prior to hip surgery for clearance due to abnormal EKG. New Patient  Associated symptoms include shortness of breath. Pertinent negatives include no chest pain and no headaches. Shortness of Breath  The history is provided by the Patient. This is a new problem. The problem occurs intermittently. The current episode started more than 1 week ago (yrs). The problem has not changed since onset. Pertinent negatives include no fever, no headaches, no cough, no wheezing, no PND, no orthopnea, no chest pain, no vomiting, no rash, no leg swelling and no claudication. The problem's precipitants include exercise (12/22 Walking with walker in the house;). She has tried beta-agonist inhalers for the symptoms. The treatment provided moderate relief. Review of Systems   Constitutional:  Negative for chills, fever, malaise/fatigue and weight loss. HENT:  Negative for nosebleeds. Eyes:  Negative for discharge. Respiratory:  Positive for shortness of breath. Negative for cough and wheezing. Cardiovascular:  Negative for chest pain, palpitations, orthopnea, claudication, leg swelling and PND. Gastrointestinal:  Negative for diarrhea, nausea and vomiting. Genitourinary:  Negative for dysuria and hematuria. Musculoskeletal:  Negative for joint pain. Skin:  Negative for rash. Neurological:  Negative for dizziness, seizures, loss of consciousness and headaches. Endo/Heme/Allergies:  Negative for polydipsia. Does not bruise/bleed easily. Psychiatric/Behavioral:  Negative for depression and substance abuse. The patient does not have insomnia. Physical Exam  Constitutional:       General: She is not in acute distress. Appearance: She is well-developed. She is obese. Comments: Use of walker   HENT:      Head: Normocephalic and atraumatic. Mouth/Throat:      Dentition: Normal dentition.    Eyes: General: No scleral icterus. Right eye: No discharge. Left eye: No discharge. Neck:      Thyroid: No thyromegaly. Vascular: No carotid bruit or JVD. Cardiovascular:      Rate and Rhythm: Normal rate and regular rhythm. Pulses: Intact distal pulses. Heart sounds: Normal heart sounds, S1 normal and S2 normal. No murmur heard. No friction rub. No gallop. Pulmonary:      Effort: Pulmonary effort is normal.      Breath sounds: Normal breath sounds. No wheezing or rales. Abdominal:      Palpations: Abdomen is soft. There is no mass. Tenderness: There is no abdominal tenderness. Musculoskeletal:      Cervical back: Neck supple. Right lower leg: No edema. Left lower leg: No edema. Lymphadenopathy:      Cervical:      Right cervical: No superficial cervical adenopathy. Left cervical: No superficial cervical adenopathy. Skin:     General: Skin is warm and dry. Findings: No rash. Neurological:      Mental Status: She is alert and oriented to person, place, and time. Psychiatric:         Behavior: Behavior normal.     2/20 echo   Impression    :    NORMAL LEFT VENTRICULAR CAVITY SIZE AND SYSTOLIC FUNCTION WITH AN EJECTION FRACTION OF 60%    BY BIPLANE. MILD DIASTOLIC DYSFUNCTION. MODERATELY DILATED LEFT ATRIUM. NORMAL RIGHT VENTRICULAR SIZE AND GLOBAL SYSTOLIC FUNCTION. TRACE AORTIC AND PULMONIC REGURGITATION. MILD TRICUSPID AND MITRAL REGURGITATION. NORMAL PULMONARY ARTERY PRESSURE OF 25 MMHG. NO PREVIOUS REPORT FOR COMPARISON. 2/20 cardiac catheterization  ASSESSMENT:   1. Patent epicardial coronary arteries. 2. Normal left ventricular systolic and diastolic function.    ASSESSMENT and PLAN    Component 02/15/22 06/22/21 11/06/19 06/27/19 02/25/19 02/01/18   Cholesterol 223 High  189 185 217 High  191 204 High    Triglyceride 106 -- 73 -- 63 136   HDL 82 -- 93 High  -- 83 High  84 High    Cholesterol/HDL 2.7 -- 2.0 -- 2.3 --   Non-HDL Cholesterol 141 High  -- -- -- -- --   LDL CALCULATION 120 High  -- 78 -- 96 93   VLDL CALCULATION 21 -- 15 -- 13 27       Diagnoses and all orders for this visit:    1. Abnormal EKG  -     AMB POC EKG ROUTINE W/ 12 LEADS, INTER & REP  -     NUCLEAR CARDIAC STRESS TEST; Future    2. Essential hypertension    3. Preop cardiovascular exam    4. SOB (shortness of breath) on exertion  -     NUCLEAR CARDIAC STRESS TEST; Future        Pertinent laboratory and test data reviewed and discussed with patient. See patient instructions also for other medical advice given    Medications Discontinued During This Encounter   Medication Reason    cyclobenzaprine (FLEXERIL) 10 mg tablet Not A Current Medication    trimethoprim-sulfamethoxazole (BACTRIM DS, SEPTRA DS) 160-800 mg per tablet Therapy Completed       Follow-up and Dispositions    Return in about 3 months (around 3/7/2023), or if symptoms worsen or fail to improve, for post test.       12/7/2022 dyspnea is concerning and new change on the EKG with possible inferior and lateral MI are concerning as well. Check nuclear stress test which is required anyway but will schedule it prior to surgery. Blood pressure is controlled  Lipids are not at goal in case she has any ASCVD but will do the stress test first.  In the meantime, diet was discussed and Mediterranean diet guidelines printed.

## 2022-12-07 NOTE — PATIENT INSTRUCTIONS
Medications Discontinued During This Encounter   Medication Reason    cyclobenzaprine (FLEXERIL) 10 mg tablet Not A Current Medication    trimethoprim-sulfamethoxazole (BACTRIM DS, SEPTRA DS) 160-800 mg per tablet Therapy Completed         Learning About the Mediterranean Diet  What is the Mediterranean diet? The Mediterranean diet is a style of eating rather than a diet plan. It features foods eaten in [place], [place], [place] and [place], and other countries along the [facility]. It emphasizes eating foods like fish, fruits, vegetables, beans, high-fiber breads and whole grains, nuts, and olive oil. This style of eating includes limited red meat, cheese, and sweets. Why choose the Mediterranean diet? A Mediterranean-style diet may improve heart health. It contains more fat than other heart-healthy diets. But the fats are mainly from nuts, unsaturated oils (such as fish oils and olive oil), and certain nut or seed oils (such as canola, soybean, or flaxseed oil). These fats may help protect the heart and blood vessels. How can you get started on the Mediterranean diet? Here are some things you can do to switch to a more Mediterranean way of eating. What to eat  Eat a variety of fruits and vegetables each day, such as grapes, blueberries, tomatoes, broccoli, peppers, figs, olives, spinach, eggplant, beans, lentils, and chickpeas. Eat a variety of whole-grain foods each day, such as oats, brown rice, and whole wheat bread, pasta, and couscous. Eat fish at least 2 times a week. Try tuna, salmon, mackerel, lake trout, herring, or sardines. Eat moderate amounts of low-fat dairy products, such as milk, cheese, or yogurt. Eat moderate amounts of poultry and eggs. Choose healthy (unsaturated) fats, such as nuts, olive oil, and certain nut or seed oils like canola, soybean, and flaxseed. Limit unhealthy (saturated) fats, such as butter, palm oil, and coconut oil.  And limit fats found in animal products, such as meat and dairy products made with whole milk. Try to eat red meat only a few times a month in very small amounts. Limit sweets and desserts to only a few times a week. This includes sugar-sweetened drinks like soda. The Mediterranean diet may also include red wine with your meal--1 glass each day for women and up to 2 glasses a day for men. Tips for eating at home  Use herbs, spices, garlic, lemon zest, and citrus juice instead of salt to add flavor to foods. Add avocado slices to your sandwich instead of gann. Have fish for lunch or dinner instead of red meat. Brush the fish with olive oil, and broil or grill it. Sprinkle your salad with seeds or nuts instead of cheese. Cook with olive or canola oil instead of butter or oils that are high in saturated fat. Switch from 2% milk or whole milk to 1% or fat-free milk. Dip raw vegetables in a vinaigrette dressing or hummus instead of dips made from mayonnaise or sour cream.  Have a piece of fruit for dessert instead of a piece of cake. Try baked apples, or have some dried fruit. Tips for eating out  Try broiled, grilled, baked, or poached fish instead of having it fried or breaded. Ask your  to have your meals prepared with olive oil instead of butter. Order dishes made with marinara sauce or sauces made from olive oil. Avoid sauces made from cream or mayonnaise. Choose whole-grain breads, whole wheat pasta and pizza crust, brown rice, beans, and lentils. Cut back on butter or margarine on bread. Instead, you can dip your bread in a small amount of olive oil. Ask for a side salad or grilled vegetables instead of french fries or chips. Where can you learn more? Go to http://www.[employer].com/  Enter O407 in the search box to learn more about \"Learning About the Mediterranean Diet. \"  Current as of: May 9, 2022               Content Version: 13.4  © 8136-0393 [employer].    Care instructions adapted under license by [employer] (which disclaims liability or warranty for this information). If you have questions about a medical condition or this instruction, always ask your healthcare professional. [address] any warranty or liability for your use of this information.

## 2023-01-09 ENCOUNTER — TELEPHONE (OUTPATIENT)
Dept: ORTHOPEDIC SURGERY | Age: 78
End: 2023-01-09

## 2023-01-09 ENCOUNTER — OFFICE VISIT (OUTPATIENT)
Dept: PULMONOLOGY | Age: 78
End: 2023-01-09
Payer: MEDICARE

## 2023-01-09 VITALS — BODY MASS INDEX: 33.97 KG/M2 | WEIGHT: 151 LBS | HEIGHT: 56 IN

## 2023-01-09 DIAGNOSIS — Z01.818 PRE-OP EXAM: Primary | ICD-10-CM

## 2023-01-09 PROCEDURE — 94060 EVALUATION OF WHEEZING: CPT | Performed by: INTERNAL MEDICINE

## 2023-01-09 NOTE — TELEPHONE ENCOUNTER
Patient called and stated that she has fallen twice and was seen at [facility] ED. She has scheduled surgery on 02/06/2023. ED did a CT scan and nothing is broken but she is in a lot of pain. They prescribed percocet. She fell on her knees and is currently unable to walk. She is worried that this will effect her surgery date. Please call and advise patient.   Patient can be reached at [phone]

## 2023-01-10 PROBLEM — R93.1 ABNORMAL NUCLEAR CARDIAC IMAGING TEST: Status: ACTIVE | Noted: 2023-01-10

## 2023-01-10 PROBLEM — M47.817 LUMBOSACRAL SPONDYLOSIS WITHOUT MYELOPATHY: Status: ACTIVE | Noted: 2022-08-26

## 2023-01-10 PROBLEM — M47.12 CERVICAL SPONDYLOSIS WITH MYELOPATHY: Status: ACTIVE | Noted: 2022-01-03

## 2023-01-10 PROBLEM — M48.062 SPINAL STENOSIS OF LUMBAR REGION WITH NEUROGENIC CLAUDICATION: Status: ACTIVE | Noted: 2022-08-26

## 2023-01-10 PROBLEM — R07.9 CHEST PAIN: Status: ACTIVE | Noted: 2023-01-10

## 2023-01-10 PROBLEM — M85.851 OSTEOPENIA OF NECKS OF BOTH FEMURS: Status: ACTIVE | Noted: 2022-02-20

## 2023-01-10 PROBLEM — M85.852 OSTEOPENIA OF NECKS OF BOTH FEMURS: Status: ACTIVE | Noted: 2022-02-20

## 2023-01-10 PROBLEM — K21.9 ESOPHAGEAL REFLUX: Status: ACTIVE | Noted: 2023-01-10

## 2023-01-10 PROBLEM — H40.9 GLAUCOMA: Status: ACTIVE | Noted: 2023-01-10

## 2023-01-10 RX ORDER — ACETAMINOPHEN 500 MG
500 TABLET ORAL 3 TIMES DAILY
COMMUNITY
Start: 2022-12-02

## 2023-01-10 RX ORDER — HYDROCODONE BITARTRATE AND ACETAMINOPHEN 5; 325 MG/1; MG/1
1 TABLET ORAL
COMMUNITY
Start: 2023-01-05 | End: 2023-01-10

## 2023-01-12 DIAGNOSIS — Z01.818 PRE-OP TESTING: Primary | ICD-10-CM

## 2023-01-12 DIAGNOSIS — M16.12 PRIMARY OSTEOARTHRITIS OF LEFT HIP: ICD-10-CM

## 2023-01-23 ENCOUNTER — OFFICE VISIT (OUTPATIENT)
Dept: PULMONOLOGY | Age: 78
End: 2023-01-23
Payer: MEDICARE

## 2023-01-23 VITALS
OXYGEN SATURATION: 99 % | WEIGHT: 151 LBS | SYSTOLIC BLOOD PRESSURE: 144 MMHG | HEIGHT: 56 IN | RESPIRATION RATE: 16 BRPM | HEART RATE: 74 BPM | TEMPERATURE: 99.2 F | BODY MASS INDEX: 33.97 KG/M2 | DIASTOLIC BLOOD PRESSURE: 81 MMHG

## 2023-01-23 DIAGNOSIS — R53.81 PHYSICAL DECONDITIONING: ICD-10-CM

## 2023-01-23 DIAGNOSIS — J45.40 MODERATE PERSISTENT ASTHMA WITHOUT COMPLICATION: ICD-10-CM

## 2023-01-23 DIAGNOSIS — E66.09 CLASS 1 OBESITY DUE TO EXCESS CALORIES WITH SERIOUS COMORBIDITY AND BODY MASS INDEX (BMI) OF 33.0 TO 33.9 IN ADULT: ICD-10-CM

## 2023-01-23 DIAGNOSIS — Z01.811 PREOP PULMONARY/RESPIRATORY EXAM: Primary | ICD-10-CM

## 2023-01-23 PROCEDURE — 1101F PT FALLS ASSESS-DOCD LE1/YR: CPT | Performed by: INTERNAL MEDICINE

## 2023-01-23 PROCEDURE — G8510 SCR DEP NEG, NO PLAN REQD: HCPCS | Performed by: INTERNAL MEDICINE

## 2023-01-23 PROCEDURE — 3079F DIAST BP 80-89 MM HG: CPT | Performed by: INTERNAL MEDICINE

## 2023-01-23 PROCEDURE — G8427 DOCREV CUR MEDS BY ELIG CLIN: HCPCS | Performed by: INTERNAL MEDICINE

## 2023-01-23 PROCEDURE — G8417 CALC BMI ABV UP PARAM F/U: HCPCS | Performed by: INTERNAL MEDICINE

## 2023-01-23 PROCEDURE — 99204 OFFICE O/P NEW MOD 45 MIN: CPT | Performed by: INTERNAL MEDICINE

## 2023-01-23 PROCEDURE — 3077F SYST BP >= 140 MM HG: CPT | Performed by: INTERNAL MEDICINE

## 2023-01-23 PROCEDURE — 1123F ACP DISCUSS/DSCN MKR DOCD: CPT | Performed by: INTERNAL MEDICINE

## 2023-01-23 PROCEDURE — G8536 NO DOC ELDER MAL SCRN: HCPCS | Performed by: INTERNAL MEDICINE

## 2023-01-23 PROCEDURE — 1090F PRES/ABSN URINE INCON ASSESS: CPT | Performed by: INTERNAL MEDICINE

## 2023-01-23 RX ORDER — HYDROCODONE BITARTRATE AND ACETAMINOPHEN 5; 325 MG/1; MG/1
1 TABLET ORAL
COMMUNITY
Start: 2023-01-09 | End: 2023-01-23

## 2023-01-23 NOTE — PROGRESS NOTES
[address] [place] [address] [facility]  Pulmonary, Critical Care, and Sleep Medicine    Pulmonary Office Initial Consultation  Name: Eros Salvador 65 y.o. female  MRN: [id number]  : 1945  Service Date: 23    Referring Provider: Nelida Mcclain, [address]   [address]  Chief Complaint:   Chief Complaint   Patient presents with    New Patient     Referred by Dr. Power Loza for medical clearance for hip surgery on 2023    Results     Spirometry 2023, CT chest chest, abd & pelv 2023       History of Present Illness:  (pt accmopanied by sister)  Eros Salvador is a 65 y.o. female, who presents to Pulmonary clinic referred for preoperative pulmonary risk assessment for hip surgery --- hx of pulmonary disease per Orthopedic Surgeon. Pt has a hx of asthma -- diagnosed in her 45s. Pt managed by PCP  Pt on Advair BID for over 10 years. PRN albuterol: used about 3x in the last month. Nocturnal awakenings: none  Triggers:  candles, perfumes, pollen  No exacerbations in the last year. No episodes of bronchitis since she retired. Lifelong nonsmoker  Occ Hx: retail and  ed instructor    B/L knee replacements and one shoulder replacement in the past --- shoulder 3 years ago by Dr. Kay Moreno -- no pulmonary complications. Last had neck surgery in 2022 -- [address] by Neurosurgery (Dr. Ellen Coughlin) --- no pulmonary complications    Recently, fell while walking with her rollator on 23 --- pt getting severe pain. Went to the pantry and fell on to her bottom -- felt like her legs gave out. Then she fell again the subsquent night falling onto her knees -- called her neighbor and they alerted EMS.   Pt then did not go to hospital.  PT then had severe pain and had to go to ER        Past Medical History:   Diagnosis Date    Arthritis, left knee     Asthma     Back pain     Bilateral hand pain [facility])     facial skin    Fuchs' corneal dystrophy     bilateral    GERD (gastroesophageal reflux disease)     Hypertension     Left shoulder pain     Osteoporosis     S/p knee replacement right      Past Surgical History:   Procedure Laterality Date    HX CERVICAL FUSION  01/2022    HX CHOLECYSTECTOMY      HX COLONOSCOPY      HX GYN      hysterectomy    HX HEENT      corneal transplants    HX HYSTERECTOMY      HX KNEE REPLACEMENT Right     HX ORTHOPAEDIC Bilateral     knee replacement     HX SHOULDER REPLACEMENT Left 2017    HX TONSILLECTOMY       Family History   Problem Relation Age of Onset    Cancer Mother     Diabetes Father     Heart Disease Father     OSTEOARTHRITIS Other     Heart Attack Neg Hx     Stroke Neg Hx      Social History     Socioeconomic History    Marital status:      Spouse name: Not on file    Number of children: Not on file    Years of education: Not on file    Highest education level: Not on file   Occupational History    Not on file   Tobacco Use    Smoking status: Never    Smokeless tobacco: Never   Vaping Use    Vaping Use: Never used   Substance and Sexual Activity    Alcohol use: No    Drug use: No    Sexual activity: Not on file   Other Topics Concern    Not on file   Social History Narrative    Not on file     Social Determinants of Health     Financial Resource Strain: Not on file   Food Insecurity: Not on file   Transportation Needs: Not on file   Physical Activity: Inactive    Days of Exercise per Week: 0 days    Minutes of Exercise per Session: 0 min   Stress: Not on file   Social Connections: Not on file   Intimate Partner Violence: Not At Risk    Fear of Current or Ex-Partner: No    Emotionally Abused: No    Physically Abused: No    Sexually Abused: No   Housing Stability: Not on file     No Known Allergies    Prior to Admission medications    Medication Sig Start Date End Date Taking?  Authorizing Provider   acetaminophen (TYLENOL) 500 mg tablet Take 500 mg by mouth three (3) times daily. 12/2/22  Yes Provider, Historical   alendronate (FOSAMAX) 70 mg tablet Take 70 mg by mouth every seven (7) days. 10/27/22  Yes Provider, Historical   gabapentin (NEURONTIN) 100 mg capsule Take 100 mg by mouth two (2) times a day. One tab in a.m. & 2 tabs in p.m. 8/2/22  Yes Provider, Historical   tiZANidine (ZANAFLEX) 2 mg tablet Take 2 mg by mouth as needed for Pain. 10/28/22  Yes Provider, Historical   meclizine (ANTIVERT) 25 mg tablet Take 25 mg by mouth three (3) times daily as needed for Dizziness. Yes Provider, Historical   losartan (COZAAR) 50 mg tablet Take 50 mg by mouth daily. Indications: high blood pressure   Yes Provider, Historical   cholecalciferol, vitamin d3, 10 mcg (400 unit) cap Take 1 Tablet by mouth daily. Yes Provider, Historical   hydroCHLOROthiazide (HYDRODIURIL) 25 mg tablet Take 25 mg by mouth daily. Yes Provider, Historical   potassium chloride (K-DUR, KLOR-CON) 20 mEq tablet Take 20 mEq by mouth two (2) times a day. 4/19/17  Yes Provider, Historical   traMADol (ULTRAM) 50 mg tablet Take 50 mg by mouth three (3) times daily. Yes Provider, Historical   diclofenac-miSOPROStol (ARTHROTEC 75)  mg-mcg per tablet Take 1 Tablet by mouth daily. 4/22/16  Yes Provider, Historical   zolpidem (AMBIEN) 5 mg tablet Take 5 mg by mouth nightly. 11/29/14  Yes Provider, Historical   brinzolamide-brimonidine 1-0.2 % drps Administer 1 Drop to both eyes three (3) times daily. Yes Provider, Historical   FLUoxetine (PROzac) 20 mg capsule Take 20 mg by mouth two (2) times a day. 8/30/16  Yes Provider, Historical   fluticasone propion-salmeteroL (ADVAIR/WIXELA) 250-50 mcg/dose diskus inhaler Take 1 Puff by inhalation two (2) times a day. 4/22/16  Yes Provider, Historical   albuterol (PROVENTIL HFA, VENTOLIN HFA, PROAIR HFA) 90 mcg/actuation inhaler Take 2 Puffs by inhalation every six (6) hours as needed for Wheezing.    Yes Provider, Historical   omeprazole ([facility]) 20 mg capsule Take 20 mg by mouth daily. Yes Provider, Historical   multivitamin (ONE A DAY) tablet Take 1 Tab by mouth daily. Yes Provider, Historical   Calcium-Cholecalciferol, D3, 600 mg-10 mcg (400 unit) cap Take 1 Tablet by mouth two (2) times a day. Yes Provider, Historical   BRINZOLAMIDE/BRIMONIDINE TART (SIMBRINZA OP) Apply 1 Drop to eye three (3) times daily. Yes Provider, Historical   HYDROcodone-acetaminophen (NORCO) 5-325 mg per tablet Take 1 Tablet by mouth every six (6) hours as needed. Patient not taking: Reported on 1/23/2023 1/9/23 1/23/23  Provider, Historical   fluorometholone (FML FORTE) 0.25 % ophthalmic suspension Administer 1 Drop to both eyes two (2) times a day. Patient not taking: Reported on 1/23/2023 1/23/23 1/23/23  Provider, Historical     Immunization History   Administered Date(s) Administered    COVID-19, MODERNA BLUE border, Primary or Immunocompromised, (age 18y+), IM, 100 mcg/0.5mL 02/06/2021, 03/06/2021    Influenza Vaccine 10/16/1997, 10/27/1998, 10/18/1999, 01/10/2002, 11/18/2002, 10/13/2005, 10/30/2008, 10/09/2010, 09/25/2014, 10/15/2015, 10/28/2016, 11/20/2017, 10/08/2018, 11/06/2019, 09/30/2020, 10/27/2022    Novel Influenza-H1N1-09, All Formulations 11/13/2009    Pneumococcal Conjugate (PCV-13) 04/14/2015    Pneumococcal Polysaccharide (PPSV-23) 10/09/2010    Td, Adsorbed PF 10/16/1997    Tdap 04/22/2016       Review of Systems:  A complete review of systems was performed as stated in the HPI, all others are negative.       Objective:    Physical Exam:  BP (!) 153/84 (BP 1 Location: Right upper arm, BP Patient Position: Sitting, BP Cuff Size: Adult long)   Pulse 67   Temp 99.2 °F (37.3 °C) (Temporal)   Resp 16   Ht 4' 8\" (1.422 m)   Wt 68.5 kg (151 lb)   SpO2 99%   BMI 33.85 kg/m²   Vitals were personally reviewed  Gen: no acute distress, pleasant and cooperative, sitting up in chair, appear sstated age  HEENT: normocephalic/atraumatic, no ocular drainage, EOMI, nasal bridge midline, no nasal drainage, fair dentition, no oral lesions  Neck: supple, trachea midline, no JVD  CVS: regular rate rhythm, S1/S2, no murmurs/rubs/gallops  Lungs: good air entry B/L, CTABL, no wheezes/rales/rhonchi  Psych: normal memory, thought content, and processing    Labs: I have reviewed the patient's available labs  Lab Results   Component Value Date/Time    WBC 7.3 12/27/2019 11:09 AM    HGB 12.2 12/27/2019 11:09 AM    HCT 36.9 12/27/2019 11:09 AM    PLATELET 465 (H) 44/86/8044 11:09 AM    MCV 93.9 12/27/2019 11:09 AM     Lab Results   Component Value Date/Time    Sodium 143 12/27/2019 11:09 AM    Potassium 4.1 12/27/2019 11:09 AM    Chloride 108 12/27/2019 11:09 AM    CO2 28 12/27/2019 11:09 AM    Anion gap 7 12/27/2019 11:09 AM    Glucose 95 12/27/2019 11:09 AM    BUN 32 (H) 12/27/2019 11:09 AM    Creatinine 1.16 12/27/2019 11:09 AM    BUN/Creatinine ratio 28 (H) 12/27/2019 11:09 AM    GFR est AA 55 (L) 12/27/2019 11:09 AM    GFR est non-AA 46 (L) 12/27/2019 11:09 AM    Calcium 9.2 12/27/2019 11:09 AM    Bilirubin, total 0.6 12/27/2019 11:09 AM    Alk. phosphatase 117 12/27/2019 11:09 AM    Protein, total 6.3 (L) 12/27/2019 11:09 AM    Albumin 3.3 (L) 12/27/2019 11:09 AM    Globulin 3.0 12/27/2019 11:09 AM    A-G Ratio 1.1 12/27/2019 11:09 AM    ALT (SGPT) 32 12/27/2019 11:09 AM    AST (SGOT) 22 12/27/2019 11:09 AM       Outside records reviewed in clinic as follows:  -Last orthopedic surgery progress note by Dr. Thong Rogers from 12/2/2022 reports that patient is having miserable left hip pain for variety of circumstances. Patient has avascular necrosis of humeral head. She has a cardiac and lung history so we will get cardiac and pulmonary clearance to move up surgery scheduled for February. Imaging:  I have personally reviewed patient's imaging as follows:  No recent imaging available, report from [facility] from 1/4/2023--CT chest abdomen pelvis with IV contrast, reports no suspicious nodule or mass or focal consolidations in lungs  Official report per radiology:  CT CHEST/ABD/PELVIS W/ CONTRAST    Anatomical Region Laterality Modality   Chest -- Computed Tomography   Abdomen -- --   Pelvis -- --     Impression        1. Left hip and gluteal intramuscular hematoma with stranding and edema of the lateral thigh. Interval progressive severe/end-stage left hip DJD with superior lateral subluxation and bony degenerative fragmentation. No acute osseous fracture. 2. No CT evidence of an acute soft tissue traumatic injury in the chest, abdomen or pelvis. No evidence of an acute osseous fracture. Signed By: Kerry Alva MD on 1/5/2023 12:29 AM  Narrative    EXAM: CT CHEST/ABD/PELVIS W/ CONTRAST     CLINICAL INDICATION/HISTORY: Provided with order -   fall left sided rib painand abdominal pain with ecchymosis     > Additional: Ground-level fall     COMPARISON: Left hip x-ray from 8/9/2022 [place] chest x-ray and left rib series from same day, CT head and/pelvis from 5/22/2014. TECHNIQUE: Helical CT imaging of the chest, abdomen, and pelvis was performed with intravenous contrast. Multiplanar reformats were generated. One or more dose reduction techniques were used on this CT: automated exposure control, adjustment of the mAs and/or kVp according to patient size, and iterative reconstruction techniques. The specific techniques used on this CT exam have been documented in the patient's electronic medical record. Digital Imaging and Communications in Medicine (DICOM) format image data are available to nonaffiliated external healthcare facilities or entities on a secure, media free, reciprocally searchable basis with patient authorization for at least a 12-month period after this study. ________________     FINDINGS:     CHEST:     LUNGS: No suspicious nodule or mass. No focal consolidation or opacity.      AIRWAY: Centrally patent     PLEURA: No pneumothorax or effusion MEDIASTINUM: Cardiomegaly without pericardial effusion. Enlarged main pulmonary artery which is a CT finding associated pulmonary arterial hypertension. Prominent thoracic aorta, suggestive of sequela of chronic hypertensive changes. No findings of dissection or saccular aneurysm formation. LYMPH NODES: No enlarged lymph nodes. OTHER: None.     _______________     ABDOMEN/PELVIS:     LIVER, BILIARY: Hepatomegaly with no suspicious or acute parenchymal abnormality. No biliary dilation. Cholecystectomy     PANCREAS: Normal.     SPLEEN: Normal.     ADRENALS: Normal.     KIDNEYS/URETERS/BLADDER: Normal.     PELVIC ORGANS: Prior hysterectomy with left adnexal 1.6 cm and 1.5 cm stable ovarian postmenopausal cysts. LYMPH NODES: No enlarged lymph nodes. GASTROINTESTINAL TRACT: No bowel dilation or wall thickening. VASCULATURE: Mild atherosclerotic changes. BONES: No acute or aggressive osseous abnormalities identified. > Prior left shoulder total arthroplasty with advanced right glenohumeral DJD. Partial visualized lower cervical anterior fusion. Chronic old healed left anterior ninth rib fracture. > Multilevel advanced degenerative disc disease and moderate spondylosis throughout the axial thoracic and lumbar spine with mild scoliosis. > Progressive End-stage left hip DJD. Superior lateral subluxation, progressive complete flattened femoral head configuration with deformity of the acetabular roof. There is periarticular chronic degenerative calcifications present. No discrete left hip fracture. Left hip intramuscular contusion and blood products, moderate stranding and lateral hip soft tissue edema.      > No acute intrapelvic fracture.      OTHER: None.     _______________  Procedure Note    Margarito Fowler MD - 01/05/2023   Formatting of this note might be different from the original.   EXAM: CT CHEST/ABD/PELVIS W/ CONTRAST     CLINICAL INDICATION/HISTORY: Provided with order - fall left sided rib painand abdominal pain with ecchymosis     > Additional: Ground-level fall     COMPARISON: Left hip x-ray from 8/9/2022 [place] chest x-ray and left rib series from same day, CT head and/pelvis from 5/22/2014. TECHNIQUE: Helical CT imaging of the chest, abdomen, and pelvis was performed with intravenous contrast. Multiplanar reformats were generated. One or more dose reduction techniques were used on this CT: automated exposure control, adjustment of the mAs and/or kVp according to patient size, and iterative reconstruction techniques. The specific techniques used on this CT exam have been documented in the patient's electronic medical record. Digital Imaging and Communications in Medicine (DICOM) format image data are available to nonaffiliated external healthcare facilities or entities on a secure, media free, reciprocally searchable basis with patient authorization for at least a 12-month period after this study. ________________     FINDINGS:     CHEST:     LUNGS: No suspicious nodule or mass. No focal consolidation or opacity. AIRWAY: Centrally patent     PLEURA: No pneumothorax or effusion     MEDIASTINUM: Cardiomegaly without pericardial effusion. Enlarged main pulmonary artery which is a CT finding associated pulmonary arterial hypertension. Prominent thoracic aorta, suggestive of sequela of chronic hypertensive changes. No findings of dissection or saccular aneurysm formation. LYMPH NODES: No enlarged lymph nodes. OTHER: None.     _______________     ABDOMEN/PELVIS:     LIVER, BILIARY: Hepatomegaly with no suspicious or acute parenchymal abnormality. No biliary dilation. Cholecystectomy     PANCREAS: Normal.     SPLEEN: Normal.     ADRENALS: Normal.     KIDNEYS/URETERS/BLADDER: Normal.     PELVIC ORGANS: Prior hysterectomy with left adnexal 1.6 cm and 1.5 cm stable ovarian postmenopausal cysts. LYMPH NODES: No enlarged lymph nodes.      GASTROINTESTINAL TRACT: No bowel dilation or wall thickening. VASCULATURE: Mild atherosclerotic changes. BONES: No acute or aggressive osseous abnormalities identified. > Prior left shoulder total arthroplasty with advanced right glenohumeral DJD. Partial visualized lower cervical anterior fusion. Chronic old healed left anterior ninth rib fracture. > Multilevel advanced degenerative disc disease and moderate spondylosis throughout the axial thoracic and lumbar spine with mild scoliosis. > Progressive End-stage left hip DJD. Superior lateral subluxation, progressive complete flattened femoral head configuration with deformity of the acetabular roof. There is periarticular chronic degenerative calcifications present. No discrete left hip fracture. Left hip intramuscular contusion and blood products, moderate stranding and lateral hip soft tissue edema.      > No acute intrapelvic fracture. OTHER: None.     _______________     IMPRESSION       1. Left hip and gluteal intramuscular hematoma with stranding and edema of the lateral thigh. Interval progressive severe/end-stage left hip DJD with superior lateral subluxation and bony degenerative fragmentation. No acute osseous fracture. 2. No CT evidence of an acute soft tissue traumatic injury in the chest, abdomen or pelvis. No evidence of an acute osseous fracture. Signed By: Edvin Suggs MD on 1/5/2023 12:29 AM  Exam End: 01/04/23 23:55    Specimen Collected: 01/05/23 00:19 Last Resulted: 01/05/23 00:29       PFTs:  I have reviewed the patient's PFTs from 1/9/2023 personally as follows: Spirometry is normal without BD response. TTE:  I have reviewed the patient's TTE results  No results found for this or any previous visit. Assessment and Plan:  65 y.o. female with:    Impression:  1. Preoperative pulmonary risk assessment for upcoming hip arthroplasty  2. Moderate persistent asthma, well controlled  3.   Deconditioning: Secondary to severe hip pain  4. Obesity: Body mass index is 33.85 kg/m². Plan:  -For preoperative pulmonary risk assessment, pt is a low risk for pulmonary complications in the perioperative and postoperative periods. This is based on ARISCAT (Canet) risk index. Potential pulmonary complications include postoperative hypoxia, atelectasis, infection, exacerbation of asthma, and respiratory failure (both prolonged mechanical ventilation and unplanned reintubation). There are no further modifiable risk factors and I have informed the patient of the risks, and she is agreeable to proceed with surgery. All of their questions were answered. In order to optimize the patient's outcomes, I would recommend that: If pt develops respiratory distress or bronchospasm, pt should receive scheduled bronchodilators in the perioperative and postoperative period  Aggressive pulmonary toileting  Frequent incentive spirometry  Out of bed as soon as possible with early ambulation and involvement of physical therapy  DVT prophylaxis as soon as possible per the surgeon    -Pt does NOT require any future pulmonary risks assessments for future surgery unless she develops new or worsening resp sx. Pt has had stable sx for 10-20+ years with multiple surgeries during this time without event, including anterior cervical discectomy with interbody fusion surgery on 1/3/22. .  This was explained to the patient and her sister at length with all questions answered    -Continue Advair 250/50 mcg 1 puff twice daily. Counseled patient to rinse mouth thoroughly after each use  -Continue albuterol HFA 1-2puffs q4-6h PRN. Counseled patient that this is their rescue inhaler.   Also counseled patient regarding premedication 15-30m prior to exercise or exposure to very cold air  -Counseled patient on proper inhaler technique  -Counseled patient to avoid potential triggers of asthma  -Advised patient to remain active, and engage in physical therapy post surgery  -Wt loss  -Immunizations reviewed, influenza, COVID, pneumococcal vaccinations up-to-date      Follow-up and Dispositions    Return in about 1 year (around 1/23/2024).             Lavelle Hewitt MD/MPH     Pulmonary, Critical Care Medicine  [facility]

## 2023-01-23 NOTE — LETTER
1/23/2023    Patient: David Downing   YOB: 1945   Date of Visit: 1/23/2023     Itzel Rios MD  [address] [phone]  Via Fax: [address] Phelps MD  [address] [facility] [address]  Via In Basket    Dear MD Juan Pablo Anne MD,      Thank you for referring Ms. Darrow Sacks to [address] [place] [address] for evaluation. My notes for this consultation are attached. If you have questions, please do not hesitate to call me. I look forward to following your patient along with you.       Sincerely,    Rossy Finn MD/MPH     Pulmonary, Critical Care Medicine  [facility]

## 2023-01-23 NOTE — PROGRESS NOTES
Leonel Maloney presents today for   Chief Complaint   Patient presents with    New Patient     Referred by Dr. Farhana Bruno for medical clearance for hip surgery on 2/6/2023    Results     Spirometry 1/9/2023, CT chest chest, abd & pelv 1/4/2023       Is someone accompanying this pt? Yes. Family member    Is the patient using any DME equipment during [address]? Yes. Wheelchair, walker, nebulizer   -DME Company N/A    Depression Screening:  3 most recent PHQ Screens 1/23/2023   PHQ Not Done -   Little interest or pleasure in doing things Not at all   Feeling down, depressed, irritable, or hopeless Not at all   Total Score PHQ 2 0       Learning Assessment:  Learning Assessment 2/5/2020   PRIMARY LEARNER Patient   PRIMARY LANGUAGE ENGLISH   LEARNER PREFERENCE PRIMARY DEMONSTRATION   ANSWERED BY Patient   RELATIONSHIP SELF       Abuse Screening:  Abuse Screening Questionnaire 1/23/2023   Do you ever feel afraid of your partner? N   Are you in a relationship with someone who physically or mentally threatens you? N   Is it safe for you to go home? Y       Fall Risk  Fall Risk Assessment, last 12 mths 1/23/2023   Able to walk? Yes   Fall in past 12 months? 0   Do you feel unsteady? 1   Are you worried about falling 1   Is TUG test greater than 12 seconds? -   Is the gait abnormal? 1   Number of falls in past 12 months 2   Fall with injury? -         Coordination of Care:  1. Have you been to the ER, urgent care clinic since your last visit? Hospitalized since your last visit? Yes; Where: [facility] ED, When: 1/4/2023-hematoma, deficit in ADL, gait instability, fall and rib injury    2. Have you seen or consulted any other health care providers outside of the [address] since your last visit? Include any pap smears or colon screening. Yes.  Dr. Elsie De Guzman, PCP

## 2023-01-24 ENCOUNTER — TELEPHONE (OUTPATIENT)
Dept: ORTHOPEDIC SURGERY | Age: 78
End: 2023-01-24

## 2023-01-24 NOTE — TELEPHONE ENCOUNTER
Monica Toussaint from [facility] is requesting the patient's pre-op notes faxed over to [phone].

## 2023-01-25 ENCOUNTER — PATIENT MESSAGE (OUTPATIENT)
Dept: ORTHOPEDIC SURGERY | Age: 78
End: 2023-01-25

## 2023-01-25 ENCOUNTER — OFFICE VISIT (OUTPATIENT)
Dept: ORTHOPEDIC SURGERY | Age: 78
End: 2023-01-25
Payer: MEDICARE

## 2023-01-25 VITALS
BODY MASS INDEX: 35.54 KG/M2 | TEMPERATURE: 97.1 F | HEIGHT: 56 IN | SYSTOLIC BLOOD PRESSURE: 130 MMHG | DIASTOLIC BLOOD PRESSURE: 83 MMHG | WEIGHT: 158 LBS

## 2023-01-25 DIAGNOSIS — Z01.818 PRE-OP EXAM: Primary | ICD-10-CM

## 2023-01-25 DIAGNOSIS — M16.12 OSTEOARTHRITIS OF LEFT HIP, UNSPECIFIED OSTEOARTHRITIS TYPE: ICD-10-CM

## 2023-01-25 DIAGNOSIS — M62.838 MUSCLE SPASM: ICD-10-CM

## 2023-01-25 RX ORDER — CYCLOBENZAPRINE HCL 10 MG
10 TABLET ORAL
Qty: 60 TABLET | Refills: 2 | Status: SHIPPED | OUTPATIENT
Start: 2023-01-25

## 2023-01-25 NOTE — PROGRESS NOTES
[address] [place] [address]  [phone]           Patient: Med Michel                MRN: [id number]       SSN: [id number]  YOB: 1945        AGE: 65 y.o. SEX: female  Body mass index is 35.42 kg/m². PCP: Darron Rausch MD  01/25/23      This office note has been dictated. REVIEW OF SYSTEMS:  Constitutional: Negative for fever, chills, weight loss and malaise/fatigue. HENT: Negative. Eyes: Negative. Respiratory: Negative. Cardiovascular: Negative. Gastrointestinal: No bowel incontinence or constipation. Genitourinary: No bladder incontinence or saddle anesthesia. Skin: Negative. Neurological: Negative. Endo/Heme/Allergies: Negative. Psychiatric/Behavioral: Negative. Musculoskeletal: As per HPI above. 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 , Disp: 60 Tablet, Rfl: 2    acetaminophen (TYLENOL) 500 mg tablet, Take 500 mg by mouth three (3) times daily. , Disp: , Rfl:     alendronate (FOSAMAX) 70 mg tablet, Take 70 mg by mouth every seven (7) days. , Disp: , Rfl:     gabapentin (NEURONTIN) 100 mg capsule, Take 100 mg by mouth two (2) times a day. One tab in a.m. & 2 tabs in p.m., Disp: , Rfl:     tiZANidine (ZANAFLEX) 2 mg tablet, Take 2 mg by mouth as needed for Pain., Disp: , Rfl:     meclizine (ANTIVERT) 25 mg tablet, Take 25 mg by mouth three (3) times daily as needed for Dizziness. , Disp: , Rfl:     losartan (COZAAR) 50 mg tablet, Take 50 mg by mouth daily. Indications: high blood pressure, Disp: , Rfl:     cholecalciferol, vitamin d3, 10 mcg (400 unit) cap, Take 1 Tablet by mouth daily. , Disp: , Rfl:     hydroCHLOROthiazide (HYDRODIURIL) 25 mg tablet, Take 25 mg by mouth daily. , Disp: , Rfl:     potassium chloride (K-DUR, KLOR-CON) 20 mEq tablet, Take 20 mEq by mouth two (2) times a day., Disp: , Rfl:     traMADol (ULTRAM) 50 mg tablet, Take 50 mg by mouth three (3) times daily. , Disp: , Rfl:     diclofenac-miSOPROStol (ARTHROTEC 75)  mg-mcg per tablet, Take 1 Tablet by mouth daily. , Disp: , Rfl:     zolpidem (AMBIEN) 5 mg tablet, Take 5 mg by mouth nightly., Disp: , Rfl:     brinzolamide-brimonidine 1-0.2 % drps, Administer 1 Drop to both eyes three (3) times daily. , Disp: , Rfl:     FLUoxetine (PROzac) 20 mg capsule, Take 20 mg by mouth two (2) times a day., Disp: , Rfl:     fluticasone propion-salmeteroL (ADVAIR/WIXELA) 250-50 mcg/dose diskus inhaler, Take 1 Puff by inhalation two (2) times a day., Disp: , Rfl:     albuterol (PROVENTIL HFA, VENTOLIN HFA, PROAIR HFA) 90 mcg/actuation inhaler, Take 2 Puffs by inhalation every six (6) hours as needed for Wheezing., Disp: , Rfl:     omeprazole (PRILOSEC) 20 mg capsule, Take 20 mg by mouth daily. , Disp: , Rfl:     multivitamin (ONE A DAY) tablet, Take 1 Tab by mouth daily. , Disp: , Rfl:     Calcium-Cholecalciferol, D3, 600 mg-10 mcg (400 unit) cap, Take 1 Tablet by mouth two (2) times a day., Disp: , Rfl:     BRINZOLAMIDE/BRIMONIDINE TART (SIMBRINZA OP), Apply 1 Drop to eye three (3) times daily. , Disp: , Rfl:     No Known Allergies    Social History     Socioeconomic History    Marital status:      Spouse name: Not on file    Number of children: Not on file    Years of education: Not on file    Highest education level: Not on file   Occupational History    Not on file   Tobacco Use    Smoking status: Never    Smokeless tobacco: Never   Vaping Use    Vaping Use: Never used   Substance and Sexual Activity Alcohol use: No    Drug use: No    Sexual activity: Not on file   Other Topics Concern    Not on file   Social History Narrative    Not on file     Social Determinants of Health     Financial Resource Strain: Not on file   Food Insecurity: Not on file   Transportation Needs: Not on file   Physical Activity: Inactive    Days of Exercise per Week: 0 days    Minutes of Exercise per Session: 0 min   Stress: Not on file   Social Connections: Not on file   Intimate Partner Violence: Not At Risk    Fear of Current or Ex-Partner: No    Emotionally Abused: No    Physically Abused: No    Sexually Abused: No   Housing Stability: Not on file       Past Surgical History:   Procedure Laterality Date    HX CERVICAL FUSION  01/2022    HX CHOLECYSTECTOMY      HX COLONOSCOPY      HX GYN      hysterectomy    HX HEENT      corneal transplants    HX HYSTERECTOMY      HX KNEE REPLACEMENT Right     HX ORTHOPAEDIC Bilateral     knee replacement     HX SHOULDER REPLACEMENT Left 2017    HX TONSILLECTOMY             Patient seen evaluated today for her left hip. She is scheduled for left total hip replacement surgery. Presents today for preoperative H&P. She states that her hip is really worsened on her. She has difficulty in ambulation. She has pain at night. She takes tramadol on occasion. She denies any radiating pain down the lower extremity. No recent injuries. She has had a fall. She was seen in the emergency room. Patient denies recent fevers, chills, chest pain, SOB, or injuries. No recent systemic changes noted. A 12-point review of systems is performed today. Pertinent positives are noted. All other systems reviewed and otherwise are negative. Physical exam: General: Alert and oriented x3, nad.  well-developed, well nourished. normal affect, AF. NC/AT, EOMI, neck supple, trachea midline, no JVD present. Breathing is non-labored.   Examination of lower extremities reveals decreased range of motion left hip with reproduction of groin and thigh discomfort. Leg lengths show the left lower extremity shorter versus in the chair. Negative straight leg raise. Negative calf tenderness. Negative Homans. No signs of DVT present. Radiographs obtained in office today 1/25/2023 at the [facility] location include AP pelvis, AP and crosstable lateral of the left hip as well as an AP of the contralateral hip confirms end-stage arthritis with significant AVN of the femoral head which is disintegrated and has superiorly worn into the acetabulum. 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 Chris Copeland and case discussed. The patient will need referral to the [facility] for reconstruction as she will need augments to reconstruct acetabulum. This was discussed today with the patient. A prescription for Flexeril be sent to her pharmacy. She does have tramadol at home. She will call with any questions or concerns that should arise.             JR Cristobal ESCOBAR, PAAlexandraC, ATC

## 2023-02-01 NOTE — TELEPHONE ENCOUNTER
Patient called in frustrated asking to speak with either Dr. Mercy Granger or LETTY Álvarez and didn't want to speak with anyone else. She says this is regarding her canceled surgery and she had a few questions as well. Please contact the patient back at [phone].

## 2023-03-22 ENCOUNTER — TELEPHONE (OUTPATIENT)
Age: 78
End: 2023-03-22

## 2023-03-22 NOTE — TELEPHONE ENCOUNTER
Patient clearance letter has been faxed and sent to Dr. Matt Vicente office. Patient was made aware. She voices understanding and acceptance of this advice and will call back if any further questions or concerns.

## 2023-03-22 NOTE — TELEPHONE ENCOUNTER
Patient called and stated that she need her nuclear stress test results sent to Dr Marylee Re so that she can get her surgery done the fax number is [phone]

## 2023-03-22 NOTE — LETTER
[facility] [address] [facility], [facility] [address]  Phone: [phone]  Fax: [phone]     3/22/2023     Dr. Hyacinth Ho      Dear Dr. Mustafa Divya:    Re: Delbert Zepeda   :        Ms. Tez Sanchez is  cleared from a cardiac standpoint with low risk for hip surgery scheduled. .    If you have any questions or any further assistance is needed please contact our office. Sincerely,             Signed By: Anton Crigler.  Cat Medrano MD    2023           cc:  Cachorro Shine MD

## 2023-03-22 NOTE — TELEPHONE ENCOUNTER
Patient called and states she came to see you 3 months ago for clearance for surgery on hip. Patient was schedule to see you in office on 3/28/23 for post results.  is requesting nuc stress test and clearance.  Please advise

## 2023-08-22 ENCOUNTER — OFFICE VISIT (OUTPATIENT)
Age: 78
End: 2023-08-22
Payer: MEDICARE

## 2023-08-22 VITALS — HEIGHT: 56 IN | WEIGHT: 167 LBS | BODY MASS INDEX: 37.57 KG/M2

## 2023-08-22 DIAGNOSIS — Z96.642 PRESENCE OF LEFT ARTIFICIAL HIP JOINT: Primary | ICD-10-CM

## 2023-08-22 PROCEDURE — 99213 OFFICE O/P EST LOW 20 MIN: CPT | Performed by: PHYSICIAN ASSISTANT

## 2023-08-22 PROCEDURE — 1036F TOBACCO NON-USER: CPT | Performed by: PHYSICIAN ASSISTANT

## 2023-08-22 PROCEDURE — 1090F PRES/ABSN URINE INCON ASSESS: CPT | Performed by: PHYSICIAN ASSISTANT

## 2023-08-22 PROCEDURE — 73502 X-RAY EXAM HIP UNI 2-3 VIEWS: CPT | Performed by: PHYSICIAN ASSISTANT

## 2023-08-22 PROCEDURE — G8427 DOCREV CUR MEDS BY ELIG CLIN: HCPCS | Performed by: PHYSICIAN ASSISTANT

## 2023-08-22 PROCEDURE — G8400 PT W/DXA NO RESULTS DOC: HCPCS | Performed by: PHYSICIAN ASSISTANT

## 2023-08-22 PROCEDURE — 1123F ACP DISCUSS/DSCN MKR DOCD: CPT | Performed by: PHYSICIAN ASSISTANT

## 2023-08-22 PROCEDURE — G8417 CALC BMI ABV UP PARAM F/U: HCPCS | Performed by: PHYSICIAN ASSISTANT

## 2023-08-22 NOTE — PROGRESS NOTES
HEENT      corneal transplants    HYSTERECTOMY (CERVIX STATUS UNKNOWN)      ORTHOPEDIC SURGERY Bilateral     knee replacement     SHOULDER ARTHROPLASTY Left 2017    TONSILLECTOMY      TOTAL KNEE ARTHROPLASTY Right        Patient seen evaluated today for her left total hip replacement. This was done back in April at [facility]. She done very well with it. She is very pleased with the results. She has finished up her outpatient physical therapy. She had no troubles the wound. She denies any start of pain. She has had no injuries or falls. She does remind me she has issues with her back in stenosis. She uses a walker for ambulation. Patient denies recent fevers, chills, chest pain, SOB, or injuries. No recent systemic changes noted. A 12-point review of systems is performed today. Pertinent positives are noted. All other systems reviewed and otherwise are negative. Physical exam: General: Alert and oriented x3, nad.  well-developed, well nourished. normal affect, AF. NC/AT, EOMI, neck supple, trachea midline, no JVD present. Breathing is non-labored. Examination of lower extremities reveals good range of motion the hips. The surgical in the left hip is healed up nicely. There is no erythema or ecchymosis noted. She does have a little bit of scarring. She has no pain with rotation of the hip. Negative straight leg raise. Negative calf tenderness. Negative Homans. No signs of DVT present. Leg lengths look good. Strength is symmetric to abduction. Radiographs obtained the office today 8/22/2023 at the [place] location include AP pelvis, AP and crosstable lateral of the left hip shows a total hip components to be well fixed without evidence for loosening or fracture noted. The superior acetabular augment in place. Cable around the proximal femur. Assessment: Status post left total hip replacement    Plan: At this point, the patient is done very well with her hip replacement.   She